# Patient Record
Sex: MALE | Race: WHITE | Employment: OTHER | URBAN - METROPOLITAN AREA
[De-identification: names, ages, dates, MRNs, and addresses within clinical notes are randomized per-mention and may not be internally consistent; named-entity substitution may affect disease eponyms.]

---

## 2017-08-17 PROBLEM — J96.01 ACUTE RESPIRATORY FAILURE WITH HYPOXIA (HCC): Status: ACTIVE | Noted: 2017-08-17

## 2017-08-17 PROBLEM — J90 PLEURAL EFFUSION: Status: ACTIVE | Noted: 2017-08-17

## 2017-08-17 PROBLEM — J18.9 CAP (COMMUNITY ACQUIRED PNEUMONIA): Status: ACTIVE | Noted: 2017-08-17

## 2017-08-17 PROBLEM — E87.20 LACTIC ACID INCREASED: Status: ACTIVE | Noted: 2017-08-17

## 2018-07-18 ENCOUNTER — PREP FOR PROCEDURE (OUTPATIENT)
Dept: PODIATRY | Age: 83
End: 2018-07-18

## 2018-07-18 DIAGNOSIS — E11.10 TYPE 2 DIABETES MELLITUS WITH KETOACIDOSIS WITHOUT COMA, WITH LONG-TERM CURRENT USE OF INSULIN (HCC): Primary | ICD-10-CM

## 2018-07-18 DIAGNOSIS — Z79.4 TYPE 2 DIABETES MELLITUS WITH KETOACIDOSIS WITHOUT COMA, WITH LONG-TERM CURRENT USE OF INSULIN (HCC): Primary | ICD-10-CM

## 2018-07-18 RX ORDER — SODIUM CHLORIDE 0.9 % (FLUSH) 0.9 %
10 SYRINGE (ML) INJECTION PRN
Status: CANCELLED | OUTPATIENT
Start: 2018-07-18 | End: 2019-07-18

## 2018-07-18 RX ORDER — SODIUM CHLORIDE 0.9 % (FLUSH) 0.9 %
10 SYRINGE (ML) INJECTION EVERY 12 HOURS SCHEDULED
Status: CANCELLED | OUTPATIENT
Start: 2018-07-18 | End: 2019-07-18

## 2018-07-20 NOTE — PROGRESS NOTES
[facility] PRE-ADMISSION TESTING INSTRUCTIONS    The Preadmission Testing patient is instructed accordingly using the following criteria (check applicable):    ARRIVAL INSTRUCTIONS:  [x] Parking the day of Surgery is located in the Main Entrance lot. Upon entering the door, make an immediate right to the surgery reception desk    [x] Bring photo ID and insurance card    [] Bring in a copy of Living will or Durable Power of  papers. [x] Please be sure to arrange transportation to and from the hospital    [x] Please arrange for someone to be with you the remainder of the day due to having anesthesia      GENERAL INSTRUCTIONS:    [x] Nothing by mouth after midnight, including gum, candy, mints or water    [x] You may brush your teeth, but do not swallow any water    [x] Take medications as instructed with 1-2 oz of water    [x] Stop herbal supplements and vitamins 5 days prior to procedure    [x] Follow preop dosing of blood thinners per physician instructions    [] Do not take insulin or oral diabetic medications    [] If diabetic and have low blood sugar or feel symptomatic, take 1-2oz apple juice or glucose tablets    [] Bring inhalers day of surgery    [] Bring C-PAP/ Bi-Pap day of surgery    [] Bring urine specimen day of surgery    [x] Antibacterial Soap shower or bath AM of Surgery, no lotion, powders or creams to surgical site    [] Follow bowel prep as instructed per surgeon    [x] No tobacco products within 24 hours of surgery     [x] No alcohol or illegal drug use within 24 hours of surgery.     [x] Jewelry, body piercing's, eyeglasses, contact lenses and dentures are not permitted into surgery (bring cases)      [] Please do not wear any nail polish or make up on the day of surgery    [x] If not already done, you can expect a call from registration    [x] If surgeon requests a time change you will be notified the day prior to surgery    [x] If you receive a survey after

## 2018-07-27 ENCOUNTER — APPOINTMENT (OUTPATIENT)
Dept: GENERAL RADIOLOGY | Age: 83
End: 2018-07-27
Attending: PODIATRIST
Payer: MEDICARE

## 2018-07-27 ENCOUNTER — ANESTHESIA EVENT (OUTPATIENT)
Dept: OPERATING ROOM | Age: 83
End: 2018-07-27
Payer: MEDICARE

## 2018-07-27 ENCOUNTER — ANESTHESIA (OUTPATIENT)
Dept: OPERATING ROOM | Age: 83
End: 2018-07-27
Payer: MEDICARE

## 2018-07-27 ENCOUNTER — HOSPITAL ENCOUNTER (OUTPATIENT)
Age: 83
Setting detail: OUTPATIENT SURGERY
Discharge: HOME | End: 2018-07-27
Attending: PODIATRIST | Admitting: PODIATRIST
Payer: MEDICARE

## 2018-07-27 VITALS
HEART RATE: 70 BPM | WEIGHT: 218 LBS | OXYGEN SATURATION: 96 % | HEIGHT: 66 IN | TEMPERATURE: 97.7 F | RESPIRATION RATE: 24 BRPM | BODY MASS INDEX: 35.03 KG/M2 | SYSTOLIC BLOOD PRESSURE: 122 MMHG | DIASTOLIC BLOOD PRESSURE: 82 MMHG

## 2018-07-27 VITALS — DIASTOLIC BLOOD PRESSURE: 74 MMHG | OXYGEN SATURATION: 98 % | SYSTOLIC BLOOD PRESSURE: 119 MMHG

## 2018-07-27 LAB
BASOPHILS ABSOLUTE: 0.06 E9/L (ref 0–0.2)
BASOPHILS RELATIVE PERCENT: 0.7 % (ref 0–2)
EOSINOPHILS ABSOLUTE: 0.54 E9/L (ref 0.05–0.5)
EOSINOPHILS RELATIVE PERCENT: 6.1 % (ref 0–6)
HCT VFR BLD CALC: 47.4 % (ref 37–54)
HEMOGLOBIN: 15.8 G/DL (ref 12.5–16.5)
IMMATURE GRANULOCYTES #: 0.07 E9/L
IMMATURE GRANULOCYTES %: 0.8 % (ref 0–5)
INR BLD: 1.2
LYMPHOCYTES ABSOLUTE: 1.06 E9/L (ref 1.5–4)
LYMPHOCYTES RELATIVE PERCENT: 11.9 % (ref 20–42)
MCH RBC QN AUTO: 33.7 PG (ref 26–35)
MCHC RBC AUTO-ENTMCNC: 33.3 % (ref 32–34.5)
MCV RBC AUTO: 101.1 FL (ref 80–99.9)
METER GLUCOSE: 99 MG/DL (ref 70–110)
MONOCYTES ABSOLUTE: 0.77 E9/L (ref 0.1–0.95)
MONOCYTES RELATIVE PERCENT: 8.7 % (ref 2–12)
NEUTROPHILS ABSOLUTE: 6.38 E9/L (ref 1.8–7.3)
NEUTROPHILS RELATIVE PERCENT: 71.8 % (ref 43–80)
PDW BLD-RTO: 13.8 FL (ref 11.5–15)
PLATELET # BLD: 234 E9/L (ref 130–450)
PMV BLD AUTO: 9.9 FL (ref 7–12)
PROTHROMBIN TIME: 14 SEC (ref 9.3–12.4)
RBC # BLD: 4.69 E12/L (ref 3.8–5.8)
WBC # BLD: 8.9 E9/L (ref 4.5–11.5)

## 2018-07-27 PROCEDURE — 2580000003 HC RX 258

## 2018-07-27 PROCEDURE — 2709999900 HC NON-CHARGEABLE SUPPLY: Performed by: PODIATRIST

## 2018-07-27 PROCEDURE — 7100000010 HC PHASE II RECOVERY - FIRST 15 MIN: Performed by: PODIATRIST

## 2018-07-27 PROCEDURE — 3700000001 HC ADD 15 MINUTES (ANESTHESIA): Performed by: PODIATRIST

## 2018-07-27 PROCEDURE — 3209999900 FLUORO FOR SURGICAL PROCEDURES

## 2018-07-27 PROCEDURE — 85025 COMPLETE CBC W/AUTO DIFF WBC: CPT

## 2018-07-27 PROCEDURE — 3600000002 HC SURGERY LEVEL 2 BASE: Performed by: PODIATRIST

## 2018-07-27 PROCEDURE — 85610 PROTHROMBIN TIME: CPT

## 2018-07-27 PROCEDURE — 88305 TISSUE EXAM BY PATHOLOGIST: CPT

## 2018-07-27 PROCEDURE — 3600000012 HC SURGERY LEVEL 2 ADDTL 15MIN: Performed by: PODIATRIST

## 2018-07-27 PROCEDURE — 6360000002 HC RX W HCPCS: Performed by: PODIATRIST

## 2018-07-27 PROCEDURE — 2500000003 HC RX 250 WO HCPCS: Performed by: PODIATRIST

## 2018-07-27 PROCEDURE — 82962 GLUCOSE BLOOD TEST: CPT

## 2018-07-27 PROCEDURE — 6360000002 HC RX W HCPCS

## 2018-07-27 PROCEDURE — 7100000011 HC PHASE II RECOVERY - ADDTL 15 MIN: Performed by: PODIATRIST

## 2018-07-27 PROCEDURE — 3700000000 HC ANESTHESIA ATTENDED CARE: Performed by: PODIATRIST

## 2018-07-27 PROCEDURE — 36415 COLL VENOUS BLD VENIPUNCTURE: CPT

## 2018-07-27 RX ORDER — DIPHENHYDRAMINE HYDROCHLORIDE 50 MG/ML
12.5 INJECTION INTRAMUSCULAR; INTRAVENOUS
Status: DISCONTINUED | OUTPATIENT
Start: 2018-07-27 | End: 2018-07-27 | Stop reason: HOSPADM

## 2018-07-27 RX ORDER — PROMETHAZINE HYDROCHLORIDE 25 MG/ML
6.25 INJECTION, SOLUTION INTRAMUSCULAR; INTRAVENOUS
Status: DISCONTINUED | OUTPATIENT
Start: 2018-07-27 | End: 2018-07-27 | Stop reason: HOSPADM

## 2018-07-27 RX ORDER — PROPOFOL 10 MG/ML
INJECTION, EMULSION INTRAVENOUS CONTINUOUS PRN
Status: DISCONTINUED | OUTPATIENT
Start: 2018-07-27 | End: 2018-07-27 | Stop reason: SDUPTHER

## 2018-07-27 RX ORDER — MEPERIDINE HYDROCHLORIDE 25 MG/ML
12.5 INJECTION INTRAMUSCULAR; INTRAVENOUS; SUBCUTANEOUS
Status: DISCONTINUED | OUTPATIENT
Start: 2018-07-27 | End: 2018-07-27 | Stop reason: HOSPADM

## 2018-07-27 RX ORDER — SODIUM CHLORIDE 9 MG/ML
INJECTION, SOLUTION INTRAVENOUS CONTINUOUS PRN
Status: DISCONTINUED | OUTPATIENT
Start: 2018-07-27 | End: 2018-07-27 | Stop reason: SDUPTHER

## 2018-07-27 RX ORDER — FENTANYL CITRATE 50 UG/ML
50 INJECTION, SOLUTION INTRAMUSCULAR; INTRAVENOUS EVERY 5 MIN PRN
Status: DISCONTINUED | OUTPATIENT
Start: 2018-07-27 | End: 2018-07-27 | Stop reason: HOSPADM

## 2018-07-27 RX ORDER — FENTANYL CITRATE 50 UG/ML
INJECTION, SOLUTION INTRAMUSCULAR; INTRAVENOUS PRN
Status: DISCONTINUED | OUTPATIENT
Start: 2018-07-27 | End: 2018-07-27 | Stop reason: SDUPTHER

## 2018-07-27 RX ORDER — SODIUM CHLORIDE 0.9 % (FLUSH) 0.9 %
10 SYRINGE (ML) INJECTION EVERY 12 HOURS SCHEDULED
Status: DISCONTINUED | OUTPATIENT
Start: 2018-07-27 | End: 2018-07-27 | Stop reason: HOSPADM

## 2018-07-27 RX ORDER — ONDANSETRON 2 MG/ML
INJECTION INTRAMUSCULAR; INTRAVENOUS PRN
Status: DISCONTINUED | OUTPATIENT
Start: 2018-07-27 | End: 2018-07-27 | Stop reason: SDUPTHER

## 2018-07-27 RX ORDER — BUPIVACAINE HYDROCHLORIDE 2.5 MG/ML
INJECTION, SOLUTION EPIDURAL; INFILTRATION; INTRACAUDAL PRN
Status: DISCONTINUED | OUTPATIENT
Start: 2018-07-27 | End: 2018-07-27 | Stop reason: HOSPADM

## 2018-07-27 RX ORDER — SODIUM CHLORIDE 0.9 % (FLUSH) 0.9 %
10 SYRINGE (ML) INJECTION PRN
Status: DISCONTINUED | OUTPATIENT
Start: 2018-07-27 | End: 2018-07-27 | Stop reason: HOSPADM

## 2018-07-27 RX ORDER — DEXAMETHASONE SODIUM PHOSPHATE 4 MG/ML
INJECTION, SOLUTION INTRA-ARTICULAR; INTRALESIONAL; INTRAMUSCULAR; INTRAVENOUS; SOFT TISSUE PRN
Status: DISCONTINUED | OUTPATIENT
Start: 2018-07-27 | End: 2018-07-27 | Stop reason: SDUPTHER

## 2018-07-27 RX ADMIN — SODIUM CHLORIDE: 9 INJECTION, SOLUTION INTRAVENOUS at 11:52

## 2018-07-27 RX ADMIN — Medication 2 G: at 11:59

## 2018-07-27 RX ADMIN — FENTANYL CITRATE 50 MCG: 50 INJECTION, SOLUTION INTRAMUSCULAR; INTRAVENOUS at 11:58

## 2018-07-27 RX ADMIN — FENTANYL CITRATE 50 MCG: 50 INJECTION, SOLUTION INTRAMUSCULAR; INTRAVENOUS at 12:05

## 2018-07-27 RX ADMIN — ONDANSETRON HYDROCHLORIDE 4 MG: 2 INJECTION, SOLUTION INTRAMUSCULAR; INTRAVENOUS at 12:07

## 2018-07-27 RX ADMIN — DEXAMETHASONE SODIUM PHOSPHATE 10 MG: 4 INJECTION, SOLUTION INTRAMUSCULAR; INTRAVENOUS at 12:07

## 2018-07-27 RX ADMIN — PROPOFOL 75 MCG/KG/MIN: 10 INJECTION, EMULSION INTRAVENOUS at 11:58

## 2018-07-27 ASSESSMENT — PULMONARY FUNCTION TESTS
PIF_VALUE: 0
PIF_VALUE: 1
PIF_VALUE: 1
PIF_VALUE: 0
PIF_VALUE: 1
PIF_VALUE: 0

## 2018-07-27 ASSESSMENT — PAIN SCALES - GENERAL: PAINLEVEL_OUTOF10: 0

## 2018-07-27 NOTE — ANESTHESIA PRE PROCEDURE
Department of Anesthesiology  Preprocedure Note       Name:  Dano Jett   Age:  80 y.o.  :  1929                                          MRN:  [id number]         Date:  2018      Surgeon: Keo Bradley):  Nicolasa Ansari DPM    Procedure: Procedure(s):  RIGHT FOOT PLANTAR FASCIITIS    Medications prior to admission:   Prior to Admission medications    Medication Sig Start Date End Date Taking? Authorizing Provider   Menthol (BIOFREEZE) 10 % AERO Apply topically   Yes Historical Provider, MD   sertraline (ZOLOFT) 25 MG tablet Take 25 mg by mouth daily Instructed to take am of procedure   Yes Historical Provider, MD   magnesium chloride (MAG DELAY) 535 (64 Mg) MG TBCR extended release tablet Take 64 mg by mouth daily   Yes Historical Provider, MD   albuterol sulfate HFA (PROAIR HFA) 108 (90 Base) MCG/ACT inhaler Inhale 2 puffs into the lungs every 6 hours as needed for Wheezing  Patient taking differently: Inhale 2 puffs into the lungs every 6 hours as needed for Wheezing Instructed to take am of procedure 17  Yes Natalia Gautam DO   HYDROcodone-acetaminophen (NORCO) 5-325 MG per tablet Take 1 tablet by mouth every 8 hours as needed for Pain   Yes Historical Provider, MD   tolterodine (DETROL LA) 4 MG ER capsule Take 4 mg by mouth every morning  8/5/15  Yes Historical Provider, MD   Multiple Vitamins-Minerals (THERAPEUTIC MULTIVITAMIN-MINERALS) tablet Take 1 tablet by mouth every morning LD 18   Yes Historical Provider, MD   warfarin (COUMADIN) 2 MG tablet Take 3 mg by mouth nightly LD 18   Yes Historical Provider, MD   donepezil (ARICEPT) 5 MG tablet Take 5 mg by mouth nightly.  2/11/15  Yes Historical Provider, MD   tamsulosin (FLOMAX) 0.4 MG capsule Take 0.4 mg by mouth nightly    Yes Historical Provider, MD   finasteride (PROSCAR) 5 MG tablet Take 5 mg by mouth Daily with supper    Yes Historical Provider, MD   furosemide (LASIX) 40 MG tablet Take 40 mg by mouth every morning    Yes Results   Component Value Date    WBC 10.7 08/21/2017    RBC 4.66 08/21/2017    HGB 15.8 08/21/2017    HCT 46.5 08/21/2017    MCV 99.8 08/21/2017    RDW 14.1 08/21/2017     08/21/2017       CMP:   Lab Results   Component Value Date     08/21/2017    K 4.5 08/21/2017     08/21/2017    CO2 21 08/21/2017    BUN 25 08/21/2017    CREATININE 1.0 08/21/2017    GFRAA >60 08/21/2017    LABGLOM >60 08/21/2017    GLUCOSE 106 08/21/2017    PROT 6.4 08/21/2017    CALCIUM 8.8 08/21/2017    BILITOT 1.4 08/21/2017    ALKPHOS 68 08/21/2017    AST 24 08/21/2017    ALT 24 08/21/2017       POC Tests: No results for input(s): POCGLU, POCNA, POCK, POCCL, POCBUN, POCHEMO, POCHCT in the last 72 hours. Coags:   Lab Results   Component Value Date    PROTIME 20.6 08/21/2017    PROTIME 21.2 02/06/2012    INR 1.8 08/21/2017       HCG (If Applicable): No results found for: PREGTESTUR, PREGSERUM, HCG, HCGQUANT     ABGs: No results found for: PHART, PO2ART, GED3MMO, RAJ5WZQ, BEART, W5WBFZAF     Type & Screen (If Applicable):  No results found for: LABABO, [address]    Anesthesia Evaluation  Patient summary reviewed and Nursing notes reviewed no history of anesthetic complications:   Airway: Mallampati: II  TM distance: >3 FB   Neck ROM: full  Mouth opening: > = 3 FB Dental: normal exam         Pulmonary:   (+) pneumonia: resolved,  COPD:  sleep apnea: on CPAP,  decreased breath sounds,                             Cardiovascular:  Exercise tolerance: poor (<4 METS),   (+) hypertension:, dysrhythmias:,         Rhythm: irregular  Rate: normal                    Neuro/Psych:   (+) depression/anxiety             GI/Hepatic/Renal:            ROS comment: BPH. Endo/Other:    (+) Diabetes, . Abdominal:           Vascular:   + PE. Anesthesia Plan      MAC     ASA 3             Anesthetic plan and risks discussed with patient and spouse.                       Charles Newsome MD

## 2018-07-27 NOTE — BRIEF OP NOTE
Brief Postoperative Note    Lupe Mathew  YOB: 1929  [id number]    Pre-operative Diagnosis: plantar fascitis right foot     Post-operative Diagnosis: Same    Procedure: plantar fascial release  Right foot     Anesthesia: MAC    Surgeons/Assistants: dr faustin      Estimated Blood Loss: less than 50     Complications: None    Specimens: Was Obtained: muscle     Findings: muscle    Electronically signed by Cristela Dorado DPM on 7/27/2018 at 12:39 PM

## 2018-07-28 NOTE — OP NOTE
lateral band intact. The area was flushed  with copious amounts of normal saline solution. C-arm was used to  visualize any inferior spur that was not noted. Again, the area was  flushed with copious amounts of normal saline solution. Closure was  achieved with 3-0 nylon in simple interrupted for skin. Postop dressing of  Adaptic, Betadine, 4x4s, Kerlix, and an Ace bandage. The patient is  nonweightbearing for two weeks. Digital circulation 1 through 5 on the  right side was intact after deflation of the tourniquet for approximately  13 minutes. The patient had no complications, returned to recovery, and  will be seen in office in three days.         Ayse Toure DPM    D: 07/27/2018 12:57:05       T: 07/27/2018 21:59:14     [email or web address]  Job#: [id number]     Doc#: [id number]    CC:

## 2018-07-30 NOTE — ANESTHESIA POSTPROCEDURE EVALUATION
Department of Anesthesiology  Postprocedure Note    Patient: Tyrese Ozuna  MRN: [id number]  YOB: 1929  Date of evaluation: 7/30/2018  Time:  1:53 PM     Procedure Summary     Date:  07/27/18 Room / Location:  [facility] OR 09 / [facility] OR    Anesthesia Start:  1152 Anesthesia Stop:  1240    Procedure:  RIGHT FOOT PLANTAR FASCIITIS (Right ) Diagnosis:  (PLANTAR FASCIITIS RIGHT FOOT)    Surgeon:  Jay Soria DPM Responsible Provider:  Luis Childs MD    Anesthesia Type:  MAC ASA Status:  3          Anesthesia Type: MAC    Diana Phase I: Diana Score: 10    Diana Phase II: Diana Score: 10    Last vitals: Reviewed and per EMR flowsheets.        Anesthesia Post Evaluation    Patient location during evaluation: PACU  Patient participation: complete - patient participated  Level of consciousness: awake and alert  Airway patency: patent  Nausea & Vomiting: no vomiting and no nausea  Complications: no  Cardiovascular status: blood pressure returned to baseline  Respiratory status: acceptable  Hydration status: euvolemic

## 2018-08-10 ENCOUNTER — OFFICE VISIT (OUTPATIENT)
Dept: CARDIOLOGY CLINIC | Age: 83
End: 2018-08-10
Payer: MEDICARE

## 2018-08-10 VITALS
HEIGHT: 66 IN | RESPIRATION RATE: 16 BRPM | WEIGHT: 218.8 LBS | SYSTOLIC BLOOD PRESSURE: 116 MMHG | BODY MASS INDEX: 35.17 KG/M2 | HEART RATE: 92 BPM | DIASTOLIC BLOOD PRESSURE: 70 MMHG

## 2018-08-10 DIAGNOSIS — I48.91 ATRIAL FIBRILLATION, UNSPECIFIED TYPE (HCC): Primary | Chronic | ICD-10-CM

## 2018-08-10 PROCEDURE — 93000 ELECTROCARDIOGRAM COMPLETE: CPT | Performed by: INTERNAL MEDICINE

## 2018-08-10 PROCEDURE — 99213 OFFICE O/P EST LOW 20 MIN: CPT | Performed by: INTERNAL MEDICINE

## 2018-08-10 PROCEDURE — 1123F ACP DISCUSS/DSCN MKR DOCD: CPT | Performed by: INTERNAL MEDICINE

## 2018-08-10 PROCEDURE — 1101F PT FALLS ASSESS-DOCD LE1/YR: CPT | Performed by: INTERNAL MEDICINE

## 2018-08-10 PROCEDURE — 4040F PNEUMOC VAC/ADMIN/RCVD: CPT | Performed by: INTERNAL MEDICINE

## 2018-08-10 PROCEDURE — G8417 CALC BMI ABV UP PARAM F/U: HCPCS | Performed by: INTERNAL MEDICINE

## 2018-08-10 PROCEDURE — 1036F TOBACCO NON-USER: CPT | Performed by: INTERNAL MEDICINE

## 2018-08-10 PROCEDURE — G8427 DOCREV CUR MEDS BY ELIG CLIN: HCPCS | Performed by: INTERNAL MEDICINE

## 2018-08-10 NOTE — PROGRESS NOTES
LVEF normal, KERA, mildmoderate TR with moderate PHTN. RVSP 60 mmHg. Trivial pericardial fluid. 21. Dobutamine MPS, 05/19/2008. No chest pain or ST changes from baseline RBBB. Gated images normal, EF 73%, no TID. Small to moderate sized area of mild basal lateral ischemia. 22. [place] [facility] [place] 23. [address]. Normal sinus mechanism without complications achieved. 24. Prostate laser surgery, Dr. Cyndie Johnson, 11/30/2009.  25. Obesity. BMI 33.41  08/2015. 26. Adenosine MPS, 06/22/2010 with no chest pain or ST changes. Gated images normal, EF 67%. Borderline TID. Moderate sized area of equivocal basal lateral ischemia. 27. Repeat lipid panel on Niaspan 1000 mg. Total cholesterol 174, triglycerides 283, HDL 33, LDL 84.  28. Echo, 11/22/2010. LV dilatation. Mild concentric LVH. No regional wall motion abnormalities. EF normal.  Stage I diastolic dysfunction. TDI showed no LA or LVEDP pressure elevation. Moderate LAE. Marked HAJA. Mild RVE. No hemodynamically significant valvular abnormalities. RVSP 55. Aortic root 4.1.    29. Echo, 01/30/2012. Markedly dilated LV. Mild concentric LVH. LV regional wall motion and systolic function normal.   Tissue Doppler indicates normal LV filling pressures. LA moderately dilated, marked HAJA. Mild MR, mild TR.  RVSP 50-55 mmHg. 30. DCCV, 02/07/2012 utilizing AP pads. Patient received a single 100 watt second shock and converted immediately to sinus rhythm. 31. OP evaluation, 09/13/2012. NYHA Class II. EKG shows AF with ventricular rate 64, RBBB and left axis deviation. 32. Head CT, 09/03/2013. Moderate to severe diffuse cerebral atrophy. [address] [place] [address] ER [place], 06/28/2014. CBC normal.  Lytes normal.  BUN 22, Cr 1.3. Pulse 98. /69. Probable mechanical fall. 34. Head CT, 05/26/2014. No acute process. Mild white matter changes. Mild volume loss.     35. Labs, 07/2014. BUN 19, Cr 1.9, GFR 50. Lytes normal.  36. 24-hour ambulatory monitor, 07/2014. AF. Isolated PVCs. Average rate 67. Longest RR cycle 2.2 seconds. 37. [facility] admission, 06/06/2015 for atypical chest pain. Tn negative. [address] MPS, 06/07/2015. No reversible defects to suggest ischemia. EF 67%. 39. Echo, 06/07/2015 ( [employer]). EF normal.  Mild RV, RA and LAE. Moderate MR.  RVSP 63. PHTN moderate. 40. CVA prophylaxis with [address] (Coumadin) 03/2016. 41. Echo 06/07/2015. Normal EF.  LVH. RVSP 63. EF 65%. 42. Hospitalized 08/17/2017 with fatigue, weakness and dyspnea. hx poor oral intake. Had lactic acidosis and acute kidney injury and an elevated digoxin level (dig level 2.3,                   PRO-BNP= 655 with BUN= 36 and creatinine= 1.7). He was volume expanded. Digoxin was stopped. Review of Systems:  Constitutional: negative for fever and chills  Respiratory: negative for cough and hemoptysis  Cardiovascular:   Gastrointestinal: negative for abdominal pain, diarrhea, nausea and vomiting  Genitourinary:negative for dysuria and hematuria  Derm: negative for rash and skin lesion(s)  Neurological: negative for seizures and tremors  Endocrine: negative for diabetic symptoms including polydipsia and polyuria  Musculoskeletal: negative for CTD  Psychiatric: negative for psychosis and major depression    On examination, he is alert, pleasant, appropriate, elderly man who appears younger than his stated age. Skin is warm and dry. Respirations are unlabored. /70   Pulse 92   Resp 16   Ht 5' 6\" (1.676 m)   Wt 218 lb 12.8 oz (99.2 kg)   BMI 35.32 kg/m² . HEENT negative for scleral icterus. Extraocular muscles intact. No facial asymmetry or central cyanosis. Neck without masses or goiter. No bruit or JVD. Cardiac apex not displaced. Rhythm is irregular. Abdomen normal.  Extremities without edema. Walking boot. EKG today shows atrial fibrillation. mouth Daily with supper , Disp: , Rfl:     furosemide (LASIX) 40 MG tablet, Take 40 mg by mouth every morning , Disp: , Rfl:     diltiazem (TAZTIA XT) 360 MG SR capsule, Take 360 mg by mouth every morning Instructed to take am of procedure, Disp: , Rfl:     aspirin 81 MG EC tablet, Take 81 mg by mouth nightly To check on hold date, Disp: , Rfl:     Dusty Arboleda MD

## 2018-09-26 LAB
AVERAGE GLUCOSE: NORMAL
CHOLESTEROL, TOTAL: 160 MG/DL
CHOLESTEROL/HDL RATIO: 3.6
CREATININE: 1.2 MG/DL
HBA1C MFR BLD: 5.8 %
HDLC SERPL-MCNC: 45 MG/DL (ref 35–70)
LDL CHOLESTEROL CALCULATED: 94 MG/DL (ref 0–160)
POTASSIUM (K+): 4.3
TRIGL SERPL-MCNC: 109 MG/DL
VLDLC SERPL CALC-MCNC: NORMAL MG/DL

## 2018-12-13 ENCOUNTER — TELEPHONE (OUTPATIENT)
Dept: ADMINISTRATIVE | Age: 83
End: 2018-12-13

## 2018-12-13 DIAGNOSIS — R06.02 SHORTNESS OF BREATH: Primary | ICD-10-CM

## 2018-12-18 ENCOUNTER — HOSPITAL ENCOUNTER (OUTPATIENT)
Dept: CARDIOLOGY | Age: 83
Discharge: HOME | End: 2018-12-18
Payer: MEDICARE

## 2018-12-18 DIAGNOSIS — R06.09 DOE (DYSPNEA ON EXERTION): ICD-10-CM

## 2018-12-18 DIAGNOSIS — I27.20 PULMONARY HYPERTENSION (HCC): ICD-10-CM

## 2018-12-18 LAB
LV EF: 40 %
LVEF MODALITY: NORMAL

## 2018-12-18 PROCEDURE — 93306 TTE W/DOPPLER COMPLETE: CPT | Performed by: PSYCHIATRY & NEUROLOGY

## 2018-12-20 ENCOUNTER — HOSPITAL ENCOUNTER (INPATIENT)
Age: 83
LOS: 4 days | Discharge: HOME | DRG: 378 | End: 2018-12-24
Attending: EMERGENCY MEDICINE | Admitting: INTERNAL MEDICINE
Payer: MEDICARE

## 2018-12-20 ENCOUNTER — APPOINTMENT (OUTPATIENT)
Dept: CT IMAGING | Age: 83
DRG: 378 | End: 2018-12-20
Payer: MEDICARE

## 2018-12-20 DIAGNOSIS — Z79.01 CHRONIC ANTICOAGULATION: ICD-10-CM

## 2018-12-20 DIAGNOSIS — N18.30 CKD (CHRONIC KIDNEY DISEASE) STAGE 3, GFR 30-59 ML/MIN (HCC): ICD-10-CM

## 2018-12-20 DIAGNOSIS — K62.5 RECTAL BLEEDING: Primary | ICD-10-CM

## 2018-12-20 PROBLEM — K92.2 GI BLEED: Status: ACTIVE | Noted: 2018-12-20

## 2018-12-20 PROBLEM — E87.20 LACTIC ACID INCREASED: Status: RESOLVED | Noted: 2017-08-17 | Resolved: 2018-12-20

## 2018-12-20 PROBLEM — J96.01 ACUTE RESPIRATORY FAILURE WITH HYPOXIA (HCC): Status: RESOLVED | Noted: 2017-08-17 | Resolved: 2018-12-20

## 2018-12-20 PROBLEM — J90 PLEURAL EFFUSION: Status: RESOLVED | Noted: 2017-08-17 | Resolved: 2018-12-20

## 2018-12-20 PROBLEM — J18.9 CAP (COMMUNITY ACQUIRED PNEUMONIA): Status: RESOLVED | Noted: 2017-08-17 | Resolved: 2018-12-20

## 2018-12-20 LAB
ABO/RH: NORMAL
ALBUMIN SERPL-MCNC: 3.6 G/DL (ref 3.5–5.2)
ALP BLD-CCNC: 63 U/L (ref 40–129)
ALT SERPL-CCNC: 14 U/L (ref 0–40)
ANION GAP SERPL CALCULATED.3IONS-SCNC: 12 MMOL/L (ref 7–16)
ANTIBODY SCREEN: NORMAL
AST SERPL-CCNC: 25 U/L (ref 0–39)
BASOPHILS ABSOLUTE: 0.02 E9/L (ref 0–0.2)
BASOPHILS RELATIVE PERCENT: 0.2 % (ref 0–2)
BILIRUB SERPL-MCNC: 0.5 MG/DL (ref 0–1.2)
BUN BLDV-MCNC: 36 MG/DL (ref 8–23)
CALCIUM SERPL-MCNC: 9 MG/DL (ref 8.6–10.2)
CHLORIDE BLD-SCNC: 104 MMOL/L (ref 98–107)
CO2: 24 MMOL/L (ref 22–29)
CREAT SERPL-MCNC: 1.4 MG/DL (ref 0.7–1.2)
DAT POLYSPECIFIC: NORMAL
DR. NOTIFY: NORMAL
EOSINOPHILS ABSOLUTE: 0.69 E9/L (ref 0.05–0.5)
EOSINOPHILS RELATIVE PERCENT: 7.2 % (ref 0–6)
GFR AFRICAN AMERICAN: 58
GFR NON-AFRICAN AMERICAN: 48 ML/MIN/1.73
GLUCOSE BLD-MCNC: 99 MG/DL (ref 74–99)
HCT VFR BLD CALC: 40.8 % (ref 37–54)
HEMOGLOBIN: 13.5 G/DL (ref 12.5–16.5)
IMMATURE GRANULOCYTES #: 0.04 E9/L
IMMATURE GRANULOCYTES %: 0.4 % (ref 0–5)
INR BLD: 3.9
LIPASE: 20 U/L (ref 13–60)
LYMPHOCYTES ABSOLUTE: 1.09 E9/L (ref 1.5–4)
LYMPHOCYTES RELATIVE PERCENT: 11.4 % (ref 20–42)
MCH RBC QN AUTO: 33.4 PG (ref 26–35)
MCHC RBC AUTO-ENTMCNC: 33.1 % (ref 32–34.5)
MCV RBC AUTO: 101 FL (ref 80–99.9)
MONOCYTES ABSOLUTE: 0.87 E9/L (ref 0.1–0.95)
MONOCYTES RELATIVE PERCENT: 9.1 % (ref 2–12)
NEUTROPHILS ABSOLUTE: 6.82 E9/L (ref 1.8–7.3)
NEUTROPHILS RELATIVE PERCENT: 71.7 % (ref 43–80)
PDW BLD-RTO: 14.3 FL (ref 11.5–15)
PLATELET # BLD: 219 E9/L (ref 130–450)
PMV BLD AUTO: 9.5 FL (ref 7–12)
POTASSIUM SERPL-SCNC: 4.8 MMOL/L (ref 3.5–5)
PROTHROMBIN TIME: 44.4 SEC (ref 9.3–12.4)
RBC # BLD: 4.04 E12/L (ref 3.8–5.8)
SODIUM BLD-SCNC: 140 MMOL/L (ref 132–146)
TOTAL PROTEIN: 6 G/DL (ref 6.4–8.3)
TROPONIN: <0.01 NG/ML (ref 0–0.03)
WBC # BLD: 9.5 E9/L (ref 4.5–11.5)

## 2018-12-20 PROCEDURE — 36415 COLL VENOUS BLD VENIPUNCTURE: CPT

## 2018-12-20 PROCEDURE — 86900 BLOOD TYPING SEROLOGIC ABO: CPT

## 2018-12-20 PROCEDURE — 80053 COMPREHEN METABOLIC PANEL: CPT

## 2018-12-20 PROCEDURE — 2580000003 HC RX 258: Performed by: INTERNAL MEDICINE

## 2018-12-20 PROCEDURE — 6360000004 HC RX CONTRAST MEDICATION: Performed by: RADIOLOGY

## 2018-12-20 PROCEDURE — 85025 COMPLETE CBC W/AUTO DIFF WBC: CPT

## 2018-12-20 PROCEDURE — 2580000003 HC RX 258: Performed by: EMERGENCY MEDICINE

## 2018-12-20 PROCEDURE — 86922 COMPATIBILITY TEST ANTIGLOB: CPT

## 2018-12-20 PROCEDURE — 86905 BLOOD TYPING RBC ANTIGENS: CPT

## 2018-12-20 PROCEDURE — 86870 RBC ANTIBODY IDENTIFICATION: CPT

## 2018-12-20 PROCEDURE — 86850 RBC ANTIBODY SCREEN: CPT

## 2018-12-20 PROCEDURE — 84484 ASSAY OF TROPONIN QUANT: CPT

## 2018-12-20 PROCEDURE — 2060000000 HC ICU INTERMEDIATE R&B

## 2018-12-20 PROCEDURE — 85610 PROTHROMBIN TIME: CPT

## 2018-12-20 PROCEDURE — 86880 COOMBS TEST DIRECT: CPT

## 2018-12-20 PROCEDURE — 99285 EMERGENCY DEPT VISIT HI MDM: CPT

## 2018-12-20 PROCEDURE — 83690 ASSAY OF LIPASE: CPT

## 2018-12-20 PROCEDURE — 86901 BLOOD TYPING SEROLOGIC RH(D): CPT

## 2018-12-20 PROCEDURE — 6360000002 HC RX W HCPCS: Performed by: EMERGENCY MEDICINE

## 2018-12-20 PROCEDURE — 74177 CT ABD & PELVIS W/CONTRAST: CPT

## 2018-12-20 RX ORDER — TAMSULOSIN HYDROCHLORIDE 0.4 MG/1
0.4 CAPSULE ORAL NIGHTLY
Status: DISCONTINUED | OUTPATIENT
Start: 2018-12-20 | End: 2018-12-24 | Stop reason: HOSPADM

## 2018-12-20 RX ORDER — SODIUM CHLORIDE 0.9 % (FLUSH) 0.9 %
10 SYRINGE (ML) INJECTION PRN
Status: DISCONTINUED | OUTPATIENT
Start: 2018-12-20 | End: 2018-12-24 | Stop reason: HOSPADM

## 2018-12-20 RX ORDER — ACETAMINOPHEN 325 MG/1
650 TABLET ORAL EVERY 4 HOURS PRN
Status: DISCONTINUED | OUTPATIENT
Start: 2018-12-20 | End: 2018-12-24 | Stop reason: HOSPADM

## 2018-12-20 RX ORDER — PANTOPRAZOLE SODIUM 40 MG/10ML
40 INJECTION, POWDER, LYOPHILIZED, FOR SOLUTION INTRAVENOUS DAILY
Status: DISCONTINUED | OUTPATIENT
Start: 2018-12-21 | End: 2018-12-21

## 2018-12-20 RX ORDER — PHYTONADIONE 10 MG/ML
10 INJECTION, EMULSION INTRAMUSCULAR; INTRAVENOUS; SUBCUTANEOUS ONCE
Status: DISCONTINUED | OUTPATIENT
Start: 2018-12-20 | End: 2018-12-20 | Stop reason: SDUPTHER

## 2018-12-20 RX ORDER — TROSPIUM CHLORIDE 20 MG/1
20 TABLET, FILM COATED ORAL NIGHTLY
Status: DISCONTINUED | OUTPATIENT
Start: 2018-12-20 | End: 2018-12-24 | Stop reason: HOSPADM

## 2018-12-20 RX ORDER — MAGNESIUM CHLORIDE 64 MG
64 TABLET, DELAYED RELEASE (ENTERIC COATED) ORAL DAILY
Status: DISCONTINUED | OUTPATIENT
Start: 2018-12-21 | End: 2018-12-24 | Stop reason: HOSPADM

## 2018-12-20 RX ORDER — ONDANSETRON 2 MG/ML
4 INJECTION INTRAMUSCULAR; INTRAVENOUS EVERY 6 HOURS PRN
Status: DISCONTINUED | OUTPATIENT
Start: 2018-12-20 | End: 2018-12-24 | Stop reason: HOSPADM

## 2018-12-20 RX ORDER — DILTIAZEM HYDROCHLORIDE 180 MG/1
360 CAPSULE, COATED, EXTENDED RELEASE ORAL EVERY MORNING
Status: DISCONTINUED | OUTPATIENT
Start: 2018-12-21 | End: 2018-12-21

## 2018-12-20 RX ORDER — FINASTERIDE 5 MG/1
5 TABLET, FILM COATED ORAL
Status: DISCONTINUED | OUTPATIENT
Start: 2018-12-20 | End: 2018-12-24 | Stop reason: HOSPADM

## 2018-12-20 RX ORDER — SODIUM CHLORIDE 0.9 % (FLUSH) 0.9 %
10 SYRINGE (ML) INJECTION EVERY 12 HOURS SCHEDULED
Status: DISCONTINUED | OUTPATIENT
Start: 2018-12-20 | End: 2018-12-24 | Stop reason: HOSPADM

## 2018-12-20 RX ORDER — 0.9 % SODIUM CHLORIDE 0.9 %
250 INTRAVENOUS SOLUTION INTRAVENOUS ONCE
Status: DISCONTINUED | OUTPATIENT
Start: 2018-12-20 | End: 2018-12-21

## 2018-12-20 RX ORDER — DONEPEZIL HYDROCHLORIDE 5 MG/1
5 TABLET, FILM COATED ORAL NIGHTLY
Status: DISCONTINUED | OUTPATIENT
Start: 2018-12-20 | End: 2018-12-24 | Stop reason: HOSPADM

## 2018-12-20 RX ORDER — SODIUM CHLORIDE 9 MG/ML
INJECTION, SOLUTION INTRAVENOUS CONTINUOUS
Status: DISCONTINUED | OUTPATIENT
Start: 2018-12-20 | End: 2018-12-24

## 2018-12-20 RX ORDER — HYDROCODONE BITARTRATE AND ACETAMINOPHEN 5; 325 MG/1; MG/1
1 TABLET ORAL EVERY 6 HOURS PRN
Status: DISCONTINUED | OUTPATIENT
Start: 2018-12-20 | End: 2018-12-24 | Stop reason: HOSPADM

## 2018-12-20 RX ADMIN — PHYTONADIONE 10 MG: 10 INJECTION, EMULSION INTRAMUSCULAR; INTRAVENOUS; SUBCUTANEOUS at 20:35

## 2018-12-20 RX ADMIN — SODIUM CHLORIDE: 9 INJECTION, SOLUTION INTRAVENOUS at 22:00

## 2018-12-20 RX ADMIN — IOPAMIDOL 110 ML: 755 INJECTION, SOLUTION INTRAVENOUS at 19:09

## 2018-12-20 ASSESSMENT — PAIN SCALES - GENERAL: PAINLEVEL_OUTOF10: 0

## 2018-12-20 NOTE — ED PROVIDER NOTES
[id number]     Description Blood Bank Red Blood Cells, Leuko-reduced     Unit Number [id number]     Dispense Status Blood Bank selected     Product Code Blood Bank [id number]     Description Blood Bank Red Blood Cells, Leuko-reduced     Unit Number [id number]     Dispense Status Blood Bank selected        RADIOLOGY:  Interpreted by Radiologist.  CT ABDOMEN PELVIS W IV CONTRAST Additional Contrast? None   Final Result   Fluid-filled and somewhat dilated rectosigmoid suggesting underlying   diarrheal illness. Colonic diverticuli. Cardiomegaly. Poorly calcified   stone versus sludge ball in the gallbladder lumen. Vascular   malformation in the left hepatic lobe. Chronic pancreatitis. Renal   cysts. ------------------------- NURSING NOTES AND VITALS REVIEWED ---------------------------   The nursing notes within the ED encounter and vital signs as below have been reviewed. /72   Pulse 102   Temp 98.6 °F (37 °C) (Oral)   Resp 19   Ht 5' 5\" (1.651 m)   Wt 196 lb 11.2 oz (89.2 kg)   SpO2 97%   BMI 32.73 kg/m²   Oxygen Saturation Interpretation: Normal    ---------------------------------------------------PHYSICAL EXAM--------------------------------------    Constitutional/General: Alert and oriented x3, well appearing, non toxic in NAD. Afebrile. Head: NC/AT  Eyes: PERRL, EOMI  HENT: Oropharynx clear. Handling secretions. Neck: Supple, full ROM  Pulmonary: Lungs clear to auscultation bilaterally, no wheezes, rales, or rhonchi. Not in respiratory distress. Cardiovascular: Regular rate. Regular rhythm. No murmurs. No gallops, or rubs. 2+ distal pulses. Abdomen: Soft, non tender, non distended. No guarding, rebound, or rigidity. Extremities: Moves all extremities x 4. Warm and well perfused. LLE erythema and swelling. Skin: Warm and dry without rash. Back: No CVA tenderness.    Neurologic: GCS 15, no focal deficits, systemic strength 5/5 to all extremities symmetrically, CN

## 2018-12-21 ENCOUNTER — ANESTHESIA EVENT (OUTPATIENT)
Dept: ENDOSCOPY | Age: 83
DRG: 378 | End: 2018-12-21
Payer: MEDICARE

## 2018-12-21 ENCOUNTER — ANESTHESIA (OUTPATIENT)
Dept: ENDOSCOPY | Age: 83
DRG: 378 | End: 2018-12-21
Payer: MEDICARE

## 2018-12-21 VITALS — SYSTOLIC BLOOD PRESSURE: 151 MMHG | OXYGEN SATURATION: 100 % | DIASTOLIC BLOOD PRESSURE: 91 MMHG

## 2018-12-21 LAB
ALBUMIN SERPL-MCNC: 3.2 G/DL (ref 3.5–5.2)
ALP BLD-CCNC: 55 U/L (ref 40–129)
ALT SERPL-CCNC: 10 U/L (ref 0–40)
ANION GAP SERPL CALCULATED.3IONS-SCNC: 13 MMOL/L (ref 7–16)
ANTIBODY IDENTIFICATION: NORMAL
AST SERPL-CCNC: 15 U/L (ref 0–39)
BASOPHILS ABSOLUTE: 0.04 E9/L (ref 0–0.2)
BASOPHILS RELATIVE PERCENT: 0.4 % (ref 0–2)
BILIRUB SERPL-MCNC: 1 MG/DL (ref 0–1.2)
BLOOD BANK DISPENSE STATUS: NORMAL
BLOOD BANK DISPENSE STATUS: NORMAL
BLOOD BANK PRODUCT CODE: NORMAL
BLOOD BANK PRODUCT CODE: NORMAL
BPU ID: NORMAL
BPU ID: NORMAL
BUN BLDV-MCNC: 38 MG/DL (ref 8–23)
C ANTIGEN: NORMAL
CALCIUM SERPL-MCNC: 8.7 MG/DL (ref 8.6–10.2)
CHLORIDE BLD-SCNC: 108 MMOL/L (ref 98–107)
CO2: 23 MMOL/L (ref 22–29)
CREAT SERPL-MCNC: 1.5 MG/DL (ref 0.7–1.2)
DESCRIPTION BLOOD BANK: NORMAL
DESCRIPTION BLOOD BANK: NORMAL
E ANTIGEN: NORMAL
EKG ATRIAL RATE: 107 BPM
EKG Q-T INTERVAL: 412 MS
EKG QRS DURATION: 168 MS
EKG QTC CALCULATION (BAZETT): 534 MS
EKG R AXIS: -71 DEGREES
EKG T AXIS: 73 DEGREES
EKG VENTRICULAR RATE: 101 BPM
EOSINOPHILS ABSOLUTE: 0.22 E9/L (ref 0.05–0.5)
EOSINOPHILS RELATIVE PERCENT: 2.2 % (ref 0–6)
FOLATE: >20 NG/ML (ref 4.8–24.2)
GFR AFRICAN AMERICAN: 53
GFR NON-AFRICAN AMERICAN: 44 ML/MIN/1.73
GLUCOSE BLD-MCNC: 128 MG/DL (ref 74–99)
HCT VFR BLD CALC: 25.6 % (ref 37–54)
HCT VFR BLD CALC: 26.4 % (ref 37–54)
HCT VFR BLD CALC: 34.6 % (ref 37–54)
HEMOGLOBIN: 11.5 G/DL (ref 12.5–16.5)
HEMOGLOBIN: 8.1 G/DL (ref 12.5–16.5)
HEMOGLOBIN: 8.8 G/DL (ref 12.5–16.5)
IMMATURE GRANULOCYTES #: 0.06 E9/L
IMMATURE GRANULOCYTES %: 0.6 % (ref 0–5)
INR BLD: 2.5
LYMPHOCYTES ABSOLUTE: 1.29 E9/L (ref 1.5–4)
LYMPHOCYTES RELATIVE PERCENT: 12.8 % (ref 20–42)
MCH RBC QN AUTO: 33.9 PG (ref 26–35)
MCHC RBC AUTO-ENTMCNC: 33.2 % (ref 32–34.5)
MCV RBC AUTO: 102.1 FL (ref 80–99.9)
MONOCYTES ABSOLUTE: 0.81 E9/L (ref 0.1–0.95)
MONOCYTES RELATIVE PERCENT: 8 % (ref 2–12)
NEUTROPHILS ABSOLUTE: 7.66 E9/L (ref 1.8–7.3)
NEUTROPHILS RELATIVE PERCENT: 76 % (ref 43–80)
PDW BLD-RTO: 14.6 FL (ref 11.5–15)
PLATELET # BLD: 208 E9/L (ref 130–450)
PMV BLD AUTO: 9.8 FL (ref 7–12)
POTASSIUM REFLEX MAGNESIUM: 4.7 MMOL/L (ref 3.5–5)
PROTHROMBIN TIME: 28.1 SEC (ref 9.3–12.4)
RBC # BLD: 3.39 E12/L (ref 3.8–5.8)
SODIUM BLD-SCNC: 144 MMOL/L (ref 132–146)
TOTAL PROTEIN: 5.1 G/DL (ref 6.4–8.3)
VITAMIN B-12: 908 PG/ML (ref 211–946)
WBC # BLD: 10.1 E9/L (ref 4.5–11.5)

## 2018-12-21 PROCEDURE — 2500000003 HC RX 250 WO HCPCS: Performed by: INTERNAL MEDICINE

## 2018-12-21 PROCEDURE — 85610 PROTHROMBIN TIME: CPT

## 2018-12-21 PROCEDURE — 0DB68ZX EXCISION OF STOMACH, VIA NATURAL OR ARTIFICIAL OPENING ENDOSCOPIC, DIAGNOSTIC: ICD-10-PCS | Performed by: SURGERY

## 2018-12-21 PROCEDURE — 85018 HEMOGLOBIN: CPT

## 2018-12-21 PROCEDURE — P9016 RBC LEUKOCYTES REDUCED: HCPCS

## 2018-12-21 PROCEDURE — 85014 HEMATOCRIT: CPT

## 2018-12-21 PROCEDURE — 93005 ELECTROCARDIOGRAM TRACING: CPT | Performed by: CLINICAL NURSE SPECIALIST

## 2018-12-21 PROCEDURE — 3700000001 HC ADD 15 MINUTES (ANESTHESIA): Performed by: SURGERY

## 2018-12-21 PROCEDURE — 3609012400 HC EGD TRANSORAL BIOPSY SINGLE/MULTIPLE: Performed by: SURGERY

## 2018-12-21 PROCEDURE — 6370000000 HC RX 637 (ALT 250 FOR IP): Performed by: INTERNAL MEDICINE

## 2018-12-21 PROCEDURE — 6360000002 HC RX W HCPCS: Performed by: INTERNAL MEDICINE

## 2018-12-21 PROCEDURE — 88305 TISSUE EXAM BY PATHOLOGIST: CPT

## 2018-12-21 PROCEDURE — 82746 ASSAY OF FOLIC ACID SERUM: CPT

## 2018-12-21 PROCEDURE — 86902 BLOOD TYPE ANTIGEN DONOR EA: CPT

## 2018-12-21 PROCEDURE — 80053 COMPREHEN METABOLIC PANEL: CPT

## 2018-12-21 PROCEDURE — 7100000010 HC PHASE II RECOVERY - FIRST 15 MIN: Performed by: SURGERY

## 2018-12-21 PROCEDURE — 36430 TRANSFUSION BLD/BLD COMPNT: CPT

## 2018-12-21 PROCEDURE — 94660 CPAP INITIATION&MGMT: CPT

## 2018-12-21 PROCEDURE — 3700000000 HC ANESTHESIA ATTENDED CARE: Performed by: SURGERY

## 2018-12-21 PROCEDURE — 82607 VITAMIN B-12: CPT

## 2018-12-21 PROCEDURE — 2700000000 HC OXYGEN THERAPY PER DAY

## 2018-12-21 PROCEDURE — 6370000000 HC RX 637 (ALT 250 FOR IP): Performed by: SURGERY

## 2018-12-21 PROCEDURE — 7100000011 HC PHASE II RECOVERY - ADDTL 15 MIN: Performed by: SURGERY

## 2018-12-21 PROCEDURE — 2580000003 HC RX 258: Performed by: INTERNAL MEDICINE

## 2018-12-21 PROCEDURE — C9113 INJ PANTOPRAZOLE SODIUM, VIA: HCPCS | Performed by: INTERNAL MEDICINE

## 2018-12-21 PROCEDURE — 85025 COMPLETE CBC W/AUTO DIFF WBC: CPT

## 2018-12-21 PROCEDURE — 93010 ELECTROCARDIOGRAM REPORT: CPT | Performed by: INTERNAL MEDICINE

## 2018-12-21 PROCEDURE — APPSS60 APP SPLIT SHARED TIME 46-60 MINUTES: Performed by: CLINICAL NURSE SPECIALIST

## 2018-12-21 PROCEDURE — 2709999900 HC NON-CHARGEABLE SUPPLY: Performed by: SURGERY

## 2018-12-21 PROCEDURE — 99222 1ST HOSP IP/OBS MODERATE 55: CPT | Performed by: INTERNAL MEDICINE

## 2018-12-21 PROCEDURE — 6360000002 HC RX W HCPCS: Performed by: NURSE ANESTHETIST, CERTIFIED REGISTERED

## 2018-12-21 PROCEDURE — 2060000000 HC ICU INTERMEDIATE R&B

## 2018-12-21 PROCEDURE — 2580000003 HC RX 258: Performed by: NURSE ANESTHETIST, CERTIFIED REGISTERED

## 2018-12-21 PROCEDURE — 88342 IMHCHEM/IMCYTCHM 1ST ANTB: CPT

## 2018-12-21 PROCEDURE — 36415 COLL VENOUS BLD VENIPUNCTURE: CPT

## 2018-12-21 PROCEDURE — P9059 PLASMA, FRZ BETWEEN 8-24HOUR: HCPCS

## 2018-12-21 RX ORDER — ISOSORBIDE MONONITRATE 30 MG/1
30 TABLET, EXTENDED RELEASE ORAL DAILY
Status: DISCONTINUED | OUTPATIENT
Start: 2018-12-21 | End: 2018-12-24 | Stop reason: HOSPADM

## 2018-12-21 RX ORDER — PANTOPRAZOLE SODIUM 40 MG/1
40 TABLET, DELAYED RELEASE ORAL
Status: DISCONTINUED | OUTPATIENT
Start: 2018-12-22 | End: 2018-12-24 | Stop reason: HOSPADM

## 2018-12-21 RX ORDER — PROPOFOL 10 MG/ML
INJECTION, EMULSION INTRAVENOUS PRN
Status: DISCONTINUED | OUTPATIENT
Start: 2018-12-21 | End: 2018-12-21 | Stop reason: SDUPTHER

## 2018-12-21 RX ORDER — PANTOPRAZOLE SODIUM 40 MG/10ML
40 INJECTION, POWDER, LYOPHILIZED, FOR SOLUTION INTRAVENOUS 2 TIMES DAILY
Status: DISCONTINUED | OUTPATIENT
Start: 2018-12-21 | End: 2018-12-21

## 2018-12-21 RX ORDER — METOPROLOL SUCCINATE 50 MG/1
50 TABLET, EXTENDED RELEASE ORAL DAILY
Status: DISCONTINUED | OUTPATIENT
Start: 2018-12-21 | End: 2018-12-23

## 2018-12-21 RX ORDER — SUCRALFATE 1 G/1
1 TABLET ORAL EVERY 6 HOURS SCHEDULED
Status: DISCONTINUED | OUTPATIENT
Start: 2018-12-21 | End: 2018-12-24 | Stop reason: HOSPADM

## 2018-12-21 RX ORDER — AMOXICILLIN 250 MG/1
250 CAPSULE ORAL 2 TIMES DAILY
Status: DISCONTINUED | OUTPATIENT
Start: 2018-12-21 | End: 2018-12-24 | Stop reason: HOSPADM

## 2018-12-21 RX ORDER — 0.9 % SODIUM CHLORIDE 0.9 %
250 INTRAVENOUS SOLUTION INTRAVENOUS ONCE
Status: DISCONTINUED | OUTPATIENT
Start: 2018-12-21 | End: 2018-12-24 | Stop reason: HOSPADM

## 2018-12-21 RX ORDER — HYDRALAZINE HYDROCHLORIDE 10 MG/1
10 TABLET, FILM COATED ORAL EVERY 12 HOURS SCHEDULED
Status: DISCONTINUED | OUTPATIENT
Start: 2018-12-21 | End: 2018-12-24 | Stop reason: HOSPADM

## 2018-12-21 RX ORDER — SODIUM CHLORIDE 9 MG/ML
INJECTION, SOLUTION INTRAVENOUS CONTINUOUS PRN
Status: DISCONTINUED | OUTPATIENT
Start: 2018-12-21 | End: 2018-12-21 | Stop reason: SDUPTHER

## 2018-12-21 RX ORDER — DILTIAZEM HYDROCHLORIDE 5 MG/ML
10 INJECTION INTRAVENOUS ONCE
Status: COMPLETED | OUTPATIENT
Start: 2018-12-21 | End: 2018-12-21

## 2018-12-21 RX ADMIN — DILTIAZEM HYDROCHLORIDE 5 MG/HR: 5 INJECTION INTRAVENOUS at 08:12

## 2018-12-21 RX ADMIN — SUCRALFATE 1 G: 1 TABLET ORAL at 12:32

## 2018-12-21 RX ADMIN — MAGNESIUM 64 MG (MAGNESIUM CHLORIDE) TABLET,DELAYED RELEASE 64 MG: at 08:09

## 2018-12-21 RX ADMIN — FINASTERIDE 5 MG: 5 TABLET, FILM COATED ORAL at 17:14

## 2018-12-21 RX ADMIN — SERTRALINE HYDROCHLORIDE 25 MG: 50 TABLET ORAL at 08:09

## 2018-12-21 RX ADMIN — ISOSORBIDE MONONITRATE 30 MG: 30 TABLET, EXTENDED RELEASE ORAL at 12:32

## 2018-12-21 RX ADMIN — SODIUM CHLORIDE: 9 INJECTION, SOLUTION INTRAVENOUS at 11:23

## 2018-12-21 RX ADMIN — Medication 10 ML: at 08:08

## 2018-12-21 RX ADMIN — HYDRALAZINE HYDROCHLORIDE 10 MG: 10 TABLET, FILM COATED ORAL at 20:39

## 2018-12-21 RX ADMIN — PANTOPRAZOLE SODIUM 40 MG: 40 INJECTION, POWDER, FOR SOLUTION INTRAVENOUS at 08:09

## 2018-12-21 RX ADMIN — DICLOFENAC 2 G: 10 GEL TOPICAL at 08:08

## 2018-12-21 RX ADMIN — DILTIAZEM HYDROCHLORIDE 10 MG: 5 INJECTION INTRAVENOUS at 07:55

## 2018-12-21 RX ADMIN — HYDRALAZINE HYDROCHLORIDE 10 MG: 10 TABLET, FILM COATED ORAL at 12:32

## 2018-12-21 RX ADMIN — DONEPEZIL HYDROCHLORIDE 5 MG: 5 TABLET, FILM COATED ORAL at 20:39

## 2018-12-21 RX ADMIN — HYDROCODONE BITARTRATE AND ACETAMINOPHEN 1 TABLET: 5; 325 TABLET ORAL at 20:39

## 2018-12-21 RX ADMIN — PROPOFOL 80 MG: 10 INJECTION, EMULSION INTRAVENOUS at 11:26

## 2018-12-21 RX ADMIN — AMOXICILLIN 250 MG: 250 CAPSULE ORAL at 08:12

## 2018-12-21 RX ADMIN — AMOXICILLIN 250 MG: 250 CAPSULE ORAL at 20:39

## 2018-12-21 RX ADMIN — METOPROLOL SUCCINATE 50 MG: 50 TABLET, EXTENDED RELEASE ORAL at 12:32

## 2018-12-21 RX ADMIN — SUCRALFATE 1 G: 1 TABLET ORAL at 17:21

## 2018-12-21 RX ADMIN — TAMSULOSIN HYDROCHLORIDE 0.4 MG: 0.4 CAPSULE ORAL at 20:38

## 2018-12-21 RX ADMIN — TROSPIUM CHLORIDE 20 MG: 20 TABLET, FILM COATED ORAL at 20:38

## 2018-12-21 ASSESSMENT — PAIN DESCRIPTION - LOCATION: LOCATION: GENERALIZED

## 2018-12-21 ASSESSMENT — PAIN SCALES - GENERAL
PAINLEVEL_OUTOF10: 0
PAINLEVEL_OUTOF10: 6
PAINLEVEL_OUTOF10: 0

## 2018-12-21 ASSESSMENT — PAIN DESCRIPTION - PAIN TYPE: TYPE: OTHER (COMMENT)

## 2018-12-21 ASSESSMENT — PAIN DESCRIPTION - DESCRIPTORS: DESCRIPTORS: ACHING

## 2018-12-21 ASSESSMENT — PAIN DESCRIPTION - FREQUENCY: FREQUENCY: INTERMITTENT

## 2018-12-21 ASSESSMENT — PAIN DESCRIPTION - PROGRESSION: CLINICAL_PROGRESSION: NOT CHANGED

## 2018-12-21 ASSESSMENT — PAIN DESCRIPTION - ONSET: ONSET: GRADUAL

## 2018-12-21 NOTE — CONSULTS
60%.  9. Echo, 02/07/2008. EF 60%, no PHTN. LA 3.5, LVH, RVSP 36.    10. BPH.  11. [place] [facility] [place]. Course complicated by PE (corpectomy T4-L1 with L1-2 fusion graft performed). 12. IVC filter (per patient). 13. [facility] readmission, 02/2008 for CHF. Pleural effusions. 14. Five week rehabilitation at [place] [address]. Readmitted [facility] for dyspnea. Recurrent PE excluded, 04/2008. 15. Patient denies diabetes mellitus saying hyperglycemia transient post op. 12. Family history positive for MI.  17. Allergy to morphine, Bactrim DS and Percocet. 18. Chronic anticoagulation with Coumadin. 19. Nephrolithiasis, 1998.    20. Echo, 05/15/2008. LVE, no LVH, LVEF normal, KERA, mild-moderate TR with moderate PHTN. RVSP 60 mmHg. Trivial pericardial fluid. 21. Dobutamine MPS, 05/19/2008. No chest pain or ST changes from baseline RBBB. Gated images normal, EF 73%, no TID. Small to moderate sized area of mild basal lateral ischemia. 22. [place] [facility] [place]. 23. [address], [facility], 10/17/2008. Normal sinus mechanism without complications achieved. 24. Prostate laser surgery, Dr. Libby Allen, 11/30/2009.  25. Obesity. BMI 33.41 - 08/2015. 26. Adenosine MPS, 06/22/2010 with no chest pain or ST changes. Gated images normal, EF 67%. Borderline TID. Moderate sized area of equivocal basal lateral ischemia. 27. Repeat lipid panel on Niaspan 1000 mg. Total cholesterol 174, triglycerides 283, HDL 33, LDL 84.  28. Echo, 11/22/2010. LV dilatation. Mild concentric LVH. No regional wall motion abnormalities. EF normal.  Stage I diastolic dysfunction. TDI showed no LA or LVEDP pressure elevation. Moderate LAE. Marked HAJA. Mild RVE. No hemodynamically significant valvular abnormalities. RVSP 55. Aortic root 4.1.    29. Echo, 01/30/2012. Markedly dilated LV. Mild concentric LVH.   LV regional wall motion and systolic function normal.   Tissue Doppler indicates normal LV filling pressures. LA moderately dilated, marked HAJA. Mild MR, mild TR.  RVSP 50-55 mmHg. 30. DCCV, 02/07/2012 utilizing AP pads. Patient received a single 100 watt second shock and converted immediately to sinus rhythm. 31. OP evaluation, 09/13/2012. NYHA Class II. EKG shows AF with ventricular rate 64, RBBB and left axis deviation. 32. Head CT, 09/03/2013. Moderate to severe diffuse cerebral atrophy. [facility] [place] a [place] the [place], 06/28/2014. CBC normal.  Lytes normal.  BUN 22, Cr 1.3. Pulse 98. /69. Probable mechanical fall. 34. Head CT, 05/26/2014. No acute process. Mild white matter changes. Mild volume loss. 35. Labs, 07/2014. BUN 19, Cr 1.9, GFR 50. Lytes normal.  36. 24-hour ambulatory monitor, 07/2014. AF. Isolated PVCs. Average rate 67. Longest RR cycle 2.2 seconds. 37. [facility] admission, 06/06/2015 for atypical chest pain. Tn negative. [address] MPS, 06/07/2015. No reversible defects to suggest ischemia. EF 67%. 39. Echo, 06/07/2015 (Dr. Brittany Holly). EF normal.  Mild RV, RA and LAE. Moderate MR.  RVSP 63. PHTN moderate. 40. CVA prophylaxis with [facility] [address] (Coumadin) 03/2016. 41. Echo 06/07/2015. Normal EF.  LVH. RVSP 63. EF 65%. 42. Hospitalized 08/17/2017 with fatigue, weakness and dyspnea. hx poor oral intake. Had lactic acidosis and acute kidney injury and an elevated digoxin level (dig level 2.3, PRO-BNP= 655 with BUN= 36 and creatinine= 1.7).  He was volume expanded.  Digoxin was stopped.    43. S/p resection of plantar fascitis muscle, right foot 7/2018  44. Echocardiogram 12/18/2018 (Dr. Chuy Tierney).  Diastolic dysfunction.  Ejection fraction is visually estimated at 35-45%.  LV global hypokinesis.   The left atrium is severely dilated.  Atrial fibrillation.  L atrial pressure elevated.  Mild to moderate mitral regurgitation with central jet   Mild aortic regurgitation.  Pulmonary hypertension is

## 2018-12-21 NOTE — ED NOTES
Attempted to do Orthostatic BP, patient became dizzy when sitting up. Patient had a large amount of rectal bleeding, that I did clean up and doctor was notified.      Gilford Space, EUSEBIO  12/20/18 0758

## 2018-12-21 NOTE — CARE COORDINATION
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 PT  CURRENTLY  RECEIVING  BLOOD  TRANSFUSION; STATES  HE IS  FEELING  BETTER. EGD  DONE  TODAY. STATES  HE  IS  PLANNING  TO  RETURN HOME  AT  DISCHARGE. AWARE OF  CURRENT  TREATMENT PLAN;WILL  FOLLOW  ALONG  WITH  SOCIAL  WORK  FOR  ANY  ADDITIONAL  NEEDS. Joey Young RN,CM.

## 2018-12-21 NOTE — CONSULTS
periphery wound area left leg. No exposed bone, tendon, and/or ligamentous structures noted. No ascending cellulitis noted and/or lymphadenopathy to left lower extremity.         Labs:  Lab Results       Component                Value               Date                       WBC                      10.1                12/21/2018                 HCT                      25.6 (L)            12/21/2018                 HGB                      8.1 (L)             12/21/2018                 PLT                      208                 12/21/2018                 NA                       144                 12/21/2018                 K                        4.7                 12/21/2018                 CL                       108 (H)             12/21/2018                 CO2                      23                  12/21/2018                 BUN                      38 (H)              12/21/2018                 CREATININE               1.5 (H)             12/21/2018                 GLUCOSE                  128 (H)             12/21/2018            CBC: Lab Results       Component                Value               Date                       WBC                      10.1                12/21/2018                 RBC                      3.39                12/21/2018                 HGB                      8.1                 12/21/2018                 HCT                      25.6                12/21/2018                 MCV                      102.1               12/21/2018                 MCH                      33.9                12/21/2018                 MCHC                     33.2                12/21/2018                 RDW                      14.6                12/21/2018                 PLT                      208                 12/21/2018                 MPV                      9.8                 12/21/2018            Hemoglobin/Hematocrit:  Lab Results       Component                Value

## 2018-12-21 NOTE — H&P
[Z79.01]     Priority: Medium    Atrial fibrillation [I48.91]     Priority: Medium    CAD (coronary artery disease) [I25.10]    Dementia [F03.90]    COPD (chronic obstructive pulmonary disease) ([facility]) [J44.9]    Chronic diastolic CHF (congestive heart failure) ([facility]) [I50.32]    Hypertension [I10]    Non-insulin dependent type 2 diabetes mellitus ([facility]) [E11.9]    MAX (obstructive sleep apnea) [G47.33]    Moderate mitral regurgitation [I34.0]    Pulmonary hypertension ([facility]) [I27.20]    Mixed hyperlipidemia [E78.2]       Patient Active Problem List    Diagnosis Date Noted    GI bleed 12/20/2018     Priority: High    Chronic anticoagulation      Priority: Medium    Atrial fibrillation      Priority: Medium    CAD (coronary artery disease)     COPD (chronic obstructive pulmonary disease) ([facility])      STABLE, SOB WITH EXERTION      Dementia     CKD (chronic kidney disease) stage 3, GFR 30-59 ml/min ([facility])     BPH (benign prostatic hyperplasia)     Chronic diastolic CHF (congestive heart failure) [facility]     Non-insulin dependent type 2 diabetes mellitus ([facility]     Hypertension     MAX (obstructive sleep apnea)      USES C PAP      Moderate mitral regurgitation 11/30/2016    Pulmonary hypertension ([facility] [facility] 11/30/2016    Mixed hyperlipidemia 06/06/2015    RBBB        Plan  Acute lower GI bleed without acute blood loss anemia: General surgery consultation--case discussed-- EGD soon. Continue Rho. PPI. Follow H&H--pRBC if Hb < 7-- 2 units pRBC on hold. NPO. IVF hydration. Hold coumadin/ASA. FFP/vitamin K given in the ED. IVF-- hold Lasix. Atrial fib w/ RVR: Hold coumadin. IV Cardizem-- bolus and infusion. Telemetry. Cardiology consult. Left LLE traumatic wound: Podiatry consult. Continue Amoxilcillin. Continue home medications other than coumadin and ASA. Follow labs  DVT prophylaxis. Please see orders for further management and care.    for discharge planning  Discharge plan:

## 2018-12-22 LAB
ALBUMIN SERPL-MCNC: 3.2 G/DL (ref 3.5–5.2)
ALP BLD-CCNC: 53 U/L (ref 40–129)
ALT SERPL-CCNC: 12 U/L (ref 0–40)
ANION GAP SERPL CALCULATED.3IONS-SCNC: 10 MMOL/L (ref 7–16)
AST SERPL-CCNC: 20 U/L (ref 0–39)
BASOPHILS ABSOLUTE: 0.04 E9/L (ref 0–0.2)
BASOPHILS RELATIVE PERCENT: 0.4 % (ref 0–2)
BILIRUB SERPL-MCNC: 0.7 MG/DL (ref 0–1.2)
BUN BLDV-MCNC: 31 MG/DL (ref 8–23)
CALCIUM SERPL-MCNC: 8.4 MG/DL (ref 8.6–10.2)
CHLORIDE BLD-SCNC: 108 MMOL/L (ref 98–107)
CO2: 23 MMOL/L (ref 22–29)
CREAT SERPL-MCNC: 1.2 MG/DL (ref 0.7–1.2)
EOSINOPHILS ABSOLUTE: 0.8 E9/L (ref 0.05–0.5)
EOSINOPHILS RELATIVE PERCENT: 8 % (ref 0–6)
GFR AFRICAN AMERICAN: >60
GFR NON-AFRICAN AMERICAN: 57 ML/MIN/1.73
GLUCOSE BLD-MCNC: 93 MG/DL (ref 74–99)
HCT VFR BLD CALC: 27.3 % (ref 37–54)
HCT VFR BLD CALC: 29.8 % (ref 37–54)
HCT VFR BLD CALC: 30.7 % (ref 37–54)
HEMOGLOBIN: 10 G/DL (ref 12.5–16.5)
HEMOGLOBIN: 8.9 G/DL (ref 12.5–16.5)
HEMOGLOBIN: 9.8 G/DL (ref 12.5–16.5)
IMMATURE GRANULOCYTES #: 0.11 E9/L
IMMATURE GRANULOCYTES %: 1.1 % (ref 0–5)
INR BLD: 1.2
LYMPHOCYTES ABSOLUTE: 1.68 E9/L (ref 1.5–4)
LYMPHOCYTES RELATIVE PERCENT: 16.8 % (ref 20–42)
MCH RBC QN AUTO: 32.1 PG (ref 26–35)
MCHC RBC AUTO-ENTMCNC: 32.9 % (ref 32–34.5)
MCV RBC AUTO: 97.7 FL (ref 80–99.9)
MONOCYTES ABSOLUTE: 1.01 E9/L (ref 0.1–0.95)
MONOCYTES RELATIVE PERCENT: 10.1 % (ref 2–12)
NEUTROPHILS ABSOLUTE: 6.35 E9/L (ref 1.8–7.3)
NEUTROPHILS RELATIVE PERCENT: 63.6 % (ref 43–80)
PDW BLD-RTO: 18.4 FL (ref 11.5–15)
PLATELET # BLD: 150 E9/L (ref 130–450)
PMV BLD AUTO: 10 FL (ref 7–12)
POTASSIUM REFLEX MAGNESIUM: 4.1 MMOL/L (ref 3.5–5)
PROTHROMBIN TIME: 14.1 SEC (ref 9.3–12.4)
RBC # BLD: 3.05 E12/L (ref 3.8–5.8)
SODIUM BLD-SCNC: 141 MMOL/L (ref 132–146)
TOTAL PROTEIN: 5.1 G/DL (ref 6.4–8.3)
WBC # BLD: 10 E9/L (ref 4.5–11.5)

## 2018-12-22 PROCEDURE — 94660 CPAP INITIATION&MGMT: CPT

## 2018-12-22 PROCEDURE — 6370000000 HC RX 637 (ALT 250 FOR IP): Performed by: INTERNAL MEDICINE

## 2018-12-22 PROCEDURE — 80053 COMPREHEN METABOLIC PANEL: CPT

## 2018-12-22 PROCEDURE — 36415 COLL VENOUS BLD VENIPUNCTURE: CPT

## 2018-12-22 PROCEDURE — 2580000003 HC RX 258: Performed by: CLINICAL NURSE SPECIALIST

## 2018-12-22 PROCEDURE — 2580000003 HC RX 258: Performed by: INTERNAL MEDICINE

## 2018-12-22 PROCEDURE — 85018 HEMOGLOBIN: CPT

## 2018-12-22 PROCEDURE — 85025 COMPLETE CBC W/AUTO DIFF WBC: CPT

## 2018-12-22 PROCEDURE — 85610 PROTHROMBIN TIME: CPT

## 2018-12-22 PROCEDURE — 6370000000 HC RX 637 (ALT 250 FOR IP): Performed by: SURGERY

## 2018-12-22 PROCEDURE — 85014 HEMATOCRIT: CPT

## 2018-12-22 PROCEDURE — 99233 SBSQ HOSP IP/OBS HIGH 50: CPT | Performed by: CLINICAL NURSE SPECIALIST

## 2018-12-22 PROCEDURE — 2060000000 HC ICU INTERMEDIATE R&B

## 2018-12-22 RX ADMIN — TROSPIUM CHLORIDE 20 MG: 20 TABLET, FILM COATED ORAL at 20:45

## 2018-12-22 RX ADMIN — SUCRALFATE 1 G: 1 TABLET ORAL at 23:55

## 2018-12-22 RX ADMIN — DONEPEZIL HYDROCHLORIDE 5 MG: 5 TABLET, FILM COATED ORAL at 20:45

## 2018-12-22 RX ADMIN — SUCRALFATE 1 G: 1 TABLET ORAL at 17:24

## 2018-12-22 RX ADMIN — METOPROLOL SUCCINATE 50 MG: 50 TABLET, EXTENDED RELEASE ORAL at 09:11

## 2018-12-22 RX ADMIN — PANTOPRAZOLE SODIUM 40 MG: 40 TABLET, DELAYED RELEASE ORAL at 06:00

## 2018-12-22 RX ADMIN — ISOSORBIDE MONONITRATE 30 MG: 30 TABLET, EXTENDED RELEASE ORAL at 09:11

## 2018-12-22 RX ADMIN — SERTRALINE HYDROCHLORIDE 25 MG: 50 TABLET ORAL at 09:11

## 2018-12-22 RX ADMIN — AMOXICILLIN 250 MG: 250 CAPSULE ORAL at 09:11

## 2018-12-22 RX ADMIN — DICLOFENAC 2 G: 10 GEL TOPICAL at 09:12

## 2018-12-22 RX ADMIN — SUCRALFATE 1 G: 1 TABLET ORAL at 06:00

## 2018-12-22 RX ADMIN — SUCRALFATE 1 G: 1 TABLET ORAL at 12:15

## 2018-12-22 RX ADMIN — AMOXICILLIN 250 MG: 250 CAPSULE ORAL at 20:45

## 2018-12-22 RX ADMIN — TAMSULOSIN HYDROCHLORIDE 0.4 MG: 0.4 CAPSULE ORAL at 20:45

## 2018-12-22 RX ADMIN — Medication 10 ML: at 09:12

## 2018-12-22 RX ADMIN — SUCRALFATE 1 G: 1 TABLET ORAL at 00:49

## 2018-12-22 RX ADMIN — HYDRALAZINE HYDROCHLORIDE 10 MG: 10 TABLET, FILM COATED ORAL at 09:11

## 2018-12-22 RX ADMIN — HYDROCODONE BITARTRATE AND ACETAMINOPHEN 1 TABLET: 5; 325 TABLET ORAL at 06:00

## 2018-12-22 RX ADMIN — HYDRALAZINE HYDROCHLORIDE 10 MG: 10 TABLET, FILM COATED ORAL at 20:45

## 2018-12-22 RX ADMIN — MAGNESIUM 64 MG (MAGNESIUM CHLORIDE) TABLET,DELAYED RELEASE 64 MG: at 09:11

## 2018-12-22 RX ADMIN — SODIUM CHLORIDE: 9 INJECTION, SOLUTION INTRAVENOUS at 16:10

## 2018-12-22 RX ADMIN — FINASTERIDE 5 MG: 5 TABLET, FILM COATED ORAL at 17:24

## 2018-12-22 ASSESSMENT — PAIN DESCRIPTION - LOCATION: LOCATION: GENERALIZED

## 2018-12-22 ASSESSMENT — PAIN DESCRIPTION - FREQUENCY: FREQUENCY: INTERMITTENT

## 2018-12-22 ASSESSMENT — PAIN SCALES - GENERAL
PAINLEVEL_OUTOF10: 7
PAINLEVEL_OUTOF10: 0
PAINLEVEL_OUTOF10: 0

## 2018-12-22 ASSESSMENT — PAIN DESCRIPTION - DESCRIPTORS: DESCRIPTORS: ACHING

## 2018-12-23 LAB
ALBUMIN SERPL-MCNC: 3.3 G/DL (ref 3.5–5.2)
ALP BLD-CCNC: 64 U/L (ref 40–129)
ALT SERPL-CCNC: 14 U/L (ref 0–40)
ANION GAP SERPL CALCULATED.3IONS-SCNC: 5 MMOL/L (ref 7–16)
AST SERPL-CCNC: 19 U/L (ref 0–39)
BASOPHILS ABSOLUTE: 0.04 E9/L (ref 0–0.2)
BASOPHILS RELATIVE PERCENT: 0.4 % (ref 0–2)
BILIRUB SERPL-MCNC: 0.3 MG/DL (ref 0–1.2)
BLOOD BANK DISPENSE STATUS: NORMAL
BLOOD BANK PRODUCT CODE: NORMAL
BPU ID: NORMAL
BUN BLDV-MCNC: 24 MG/DL (ref 8–23)
CALCIUM SERPL-MCNC: 8.6 MG/DL (ref 8.6–10.2)
CHLORIDE BLD-SCNC: 114 MMOL/L (ref 98–107)
CO2: 24 MMOL/L (ref 22–29)
CREAT SERPL-MCNC: 1.1 MG/DL (ref 0.7–1.2)
DESCRIPTION BLOOD BANK: NORMAL
EOSINOPHILS ABSOLUTE: 0.95 E9/L (ref 0.05–0.5)
EOSINOPHILS RELATIVE PERCENT: 8.8 % (ref 0–6)
GFR AFRICAN AMERICAN: >60
GFR NON-AFRICAN AMERICAN: >60 ML/MIN/1.73
GLUCOSE BLD-MCNC: 106 MG/DL (ref 74–99)
HCT VFR BLD CALC: 26.2 % (ref 37–54)
HCT VFR BLD CALC: 28.1 % (ref 37–54)
HCT VFR BLD CALC: 29.4 % (ref 37–54)
HCT VFR BLD CALC: 29.9 % (ref 37–54)
HCT VFR BLD CALC: 30 % (ref 37–54)
HEMOGLOBIN: 8.7 G/DL (ref 12.5–16.5)
HEMOGLOBIN: 9.3 G/DL (ref 12.5–16.5)
HEMOGLOBIN: 9.4 G/DL (ref 12.5–16.5)
HEMOGLOBIN: 9.8 G/DL (ref 12.5–16.5)
HEMOGLOBIN: 9.8 G/DL (ref 12.5–16.5)
IMMATURE GRANULOCYTES #: 0.14 E9/L
IMMATURE GRANULOCYTES %: 1.3 % (ref 0–5)
INR BLD: 1.2
LYMPHOCYTES ABSOLUTE: 1.45 E9/L (ref 1.5–4)
LYMPHOCYTES RELATIVE PERCENT: 13.5 % (ref 20–42)
MCH RBC QN AUTO: 32.9 PG (ref 26–35)
MCHC RBC AUTO-ENTMCNC: 32.7 % (ref 32–34.5)
MCV RBC AUTO: 100.7 FL (ref 80–99.9)
MONOCYTES ABSOLUTE: 1.08 E9/L (ref 0.1–0.95)
MONOCYTES RELATIVE PERCENT: 10 % (ref 2–12)
NEUTROPHILS ABSOLUTE: 7.11 E9/L (ref 1.8–7.3)
NEUTROPHILS RELATIVE PERCENT: 66 % (ref 43–80)
PDW BLD-RTO: 18.2 FL (ref 11.5–15)
PLATELET # BLD: 146 E9/L (ref 130–450)
PMV BLD AUTO: 9.7 FL (ref 7–12)
POTASSIUM REFLEX MAGNESIUM: 4 MMOL/L (ref 3.5–5)
PROTHROMBIN TIME: 13.5 SEC (ref 9.3–12.4)
RBC # BLD: 2.98 E12/L (ref 3.8–5.8)
SODIUM BLD-SCNC: 143 MMOL/L (ref 132–146)
TOTAL PROTEIN: 5.2 G/DL (ref 6.4–8.3)
WBC # BLD: 10.8 E9/L (ref 4.5–11.5)

## 2018-12-23 PROCEDURE — 85610 PROTHROMBIN TIME: CPT

## 2018-12-23 PROCEDURE — 80053 COMPREHEN METABOLIC PANEL: CPT

## 2018-12-23 PROCEDURE — 6370000000 HC RX 637 (ALT 250 FOR IP): Performed by: INTERNAL MEDICINE

## 2018-12-23 PROCEDURE — 36415 COLL VENOUS BLD VENIPUNCTURE: CPT

## 2018-12-23 PROCEDURE — 2580000003 HC RX 258: Performed by: INTERNAL MEDICINE

## 2018-12-23 PROCEDURE — 85018 HEMOGLOBIN: CPT

## 2018-12-23 PROCEDURE — 94660 CPAP INITIATION&MGMT: CPT

## 2018-12-23 PROCEDURE — 2060000000 HC ICU INTERMEDIATE R&B

## 2018-12-23 PROCEDURE — 85025 COMPLETE CBC W/AUTO DIFF WBC: CPT

## 2018-12-23 PROCEDURE — 6370000000 HC RX 637 (ALT 250 FOR IP): Performed by: SURGERY

## 2018-12-23 PROCEDURE — 85014 HEMATOCRIT: CPT

## 2018-12-23 PROCEDURE — 2580000003 HC RX 258: Performed by: CLINICAL NURSE SPECIALIST

## 2018-12-23 RX ORDER — METOPROLOL SUCCINATE 25 MG/1
25 TABLET, EXTENDED RELEASE ORAL DAILY
Status: DISCONTINUED | OUTPATIENT
Start: 2018-12-23 | End: 2018-12-24 | Stop reason: HOSPADM

## 2018-12-23 RX ADMIN — AMOXICILLIN 250 MG: 250 CAPSULE ORAL at 21:13

## 2018-12-23 RX ADMIN — DICLOFENAC 2 G: 10 GEL TOPICAL at 09:29

## 2018-12-23 RX ADMIN — SUCRALFATE 1 G: 1 TABLET ORAL at 12:11

## 2018-12-23 RX ADMIN — DONEPEZIL HYDROCHLORIDE 5 MG: 5 TABLET, FILM COATED ORAL at 21:13

## 2018-12-23 RX ADMIN — MAGNESIUM 64 MG (MAGNESIUM CHLORIDE) TABLET,DELAYED RELEASE 64 MG: at 09:28

## 2018-12-23 RX ADMIN — TAMSULOSIN HYDROCHLORIDE 0.4 MG: 0.4 CAPSULE ORAL at 21:13

## 2018-12-23 RX ADMIN — SUCRALFATE 1 G: 1 TABLET ORAL at 17:37

## 2018-12-23 RX ADMIN — TROSPIUM CHLORIDE 20 MG: 20 TABLET, FILM COATED ORAL at 21:13

## 2018-12-23 RX ADMIN — Medication 10 ML: at 21:13

## 2018-12-23 RX ADMIN — SUCRALFATE 1 G: 1 TABLET ORAL at 06:10

## 2018-12-23 RX ADMIN — AMOXICILLIN 250 MG: 250 CAPSULE ORAL at 09:28

## 2018-12-23 RX ADMIN — HYDRALAZINE HYDROCHLORIDE 10 MG: 10 TABLET, FILM COATED ORAL at 09:29

## 2018-12-23 RX ADMIN — SODIUM CHLORIDE: 9 INJECTION, SOLUTION INTRAVENOUS at 14:19

## 2018-12-23 RX ADMIN — FINASTERIDE 5 MG: 5 TABLET, FILM COATED ORAL at 17:37

## 2018-12-23 RX ADMIN — PANTOPRAZOLE SODIUM 40 MG: 40 TABLET, DELAYED RELEASE ORAL at 06:10

## 2018-12-23 RX ADMIN — SUCRALFATE 1 G: 1 TABLET ORAL at 23:59

## 2018-12-23 RX ADMIN — Medication 10 ML: at 09:29

## 2018-12-23 RX ADMIN — SERTRALINE HYDROCHLORIDE 25 MG: 50 TABLET ORAL at 09:28

## 2018-12-23 RX ADMIN — METOPROLOL SUCCINATE 25 MG: 25 TABLET, EXTENDED RELEASE ORAL at 09:28

## 2018-12-23 RX ADMIN — ISOSORBIDE MONONITRATE 30 MG: 30 TABLET, EXTENDED RELEASE ORAL at 09:28

## 2018-12-23 RX ADMIN — HYDRALAZINE HYDROCHLORIDE 10 MG: 10 TABLET, FILM COATED ORAL at 21:13

## 2018-12-23 ASSESSMENT — PAIN SCALES - GENERAL
PAINLEVEL_OUTOF10: 0

## 2018-12-24 VITALS
BODY MASS INDEX: 32.77 KG/M2 | OXYGEN SATURATION: 100 % | HEART RATE: 103 BPM | SYSTOLIC BLOOD PRESSURE: 139 MMHG | DIASTOLIC BLOOD PRESSURE: 86 MMHG | TEMPERATURE: 97.5 F | HEIGHT: 65 IN | RESPIRATION RATE: 20 BRPM | WEIGHT: 196.69 LBS

## 2018-12-24 LAB
ALBUMIN SERPL-MCNC: 3.2 G/DL (ref 3.5–5.2)
ALP BLD-CCNC: 61 U/L (ref 40–129)
ALT SERPL-CCNC: 13 U/L (ref 0–40)
ANION GAP SERPL CALCULATED.3IONS-SCNC: 6 MMOL/L (ref 7–16)
AST SERPL-CCNC: 21 U/L (ref 0–39)
BASOPHILS ABSOLUTE: 0.04 E9/L (ref 0–0.2)
BASOPHILS RELATIVE PERCENT: 0.4 % (ref 0–2)
BILIRUB SERPL-MCNC: 0.4 MG/DL (ref 0–1.2)
BLOOD BANK DISPENSE STATUS: NORMAL
BLOOD BANK DISPENSE STATUS: NORMAL
BLOOD BANK PRODUCT CODE: NORMAL
BLOOD BANK PRODUCT CODE: NORMAL
BPU ID: NORMAL
BPU ID: NORMAL
BUN BLDV-MCNC: 16 MG/DL (ref 8–23)
CALCIUM SERPL-MCNC: 8.2 MG/DL (ref 8.6–10.2)
CHLORIDE BLD-SCNC: 115 MMOL/L (ref 98–107)
CO2: 24 MMOL/L (ref 22–29)
CREAT SERPL-MCNC: 1 MG/DL (ref 0.7–1.2)
DESCRIPTION BLOOD BANK: NORMAL
DESCRIPTION BLOOD BANK: NORMAL
EOSINOPHILS ABSOLUTE: 1.13 E9/L (ref 0.05–0.5)
EOSINOPHILS RELATIVE PERCENT: 11.6 % (ref 0–6)
GFR AFRICAN AMERICAN: >60
GFR NON-AFRICAN AMERICAN: >60 ML/MIN/1.73
GLUCOSE BLD-MCNC: 97 MG/DL (ref 74–99)
HCT VFR BLD CALC: 30 % (ref 37–54)
HEMOGLOBIN: 9.6 G/DL (ref 12.5–16.5)
IMMATURE GRANULOCYTES #: 0.12 E9/L
IMMATURE GRANULOCYTES %: 1.2 % (ref 0–5)
INR BLD: 1.2
LYMPHOCYTES ABSOLUTE: 1.21 E9/L (ref 1.5–4)
LYMPHOCYTES RELATIVE PERCENT: 12.4 % (ref 20–42)
MCH RBC QN AUTO: 32.5 PG (ref 26–35)
MCHC RBC AUTO-ENTMCNC: 32 % (ref 32–34.5)
MCV RBC AUTO: 101.7 FL (ref 80–99.9)
MONOCYTES ABSOLUTE: 0.96 E9/L (ref 0.1–0.95)
MONOCYTES RELATIVE PERCENT: 9.8 % (ref 2–12)
NEUTROPHILS ABSOLUTE: 6.29 E9/L (ref 1.8–7.3)
NEUTROPHILS RELATIVE PERCENT: 64.6 % (ref 43–80)
PDW BLD-RTO: 17.7 FL (ref 11.5–15)
PLATELET # BLD: 156 E9/L (ref 130–450)
PMV BLD AUTO: 9.2 FL (ref 7–12)
POTASSIUM REFLEX MAGNESIUM: 4 MMOL/L (ref 3.5–5)
PROTHROMBIN TIME: 14.3 SEC (ref 9.3–12.4)
RBC # BLD: 2.95 E12/L (ref 3.8–5.8)
SODIUM BLD-SCNC: 145 MMOL/L (ref 132–146)
TOTAL PROTEIN: 5.1 G/DL (ref 6.4–8.3)
WBC # BLD: 9.8 E9/L (ref 4.5–11.5)

## 2018-12-24 PROCEDURE — 36415 COLL VENOUS BLD VENIPUNCTURE: CPT

## 2018-12-24 PROCEDURE — 80053 COMPREHEN METABOLIC PANEL: CPT

## 2018-12-24 PROCEDURE — 85610 PROTHROMBIN TIME: CPT

## 2018-12-24 PROCEDURE — 6370000000 HC RX 637 (ALT 250 FOR IP): Performed by: CLINICAL NURSE SPECIALIST

## 2018-12-24 PROCEDURE — 6370000000 HC RX 637 (ALT 250 FOR IP): Performed by: SURGERY

## 2018-12-24 PROCEDURE — 6370000000 HC RX 637 (ALT 250 FOR IP): Performed by: INTERNAL MEDICINE

## 2018-12-24 PROCEDURE — 94660 CPAP INITIATION&MGMT: CPT

## 2018-12-24 PROCEDURE — APPSS30 APP SPLIT SHARED TIME 16-30 MINUTES: Performed by: CLINICAL NURSE SPECIALIST

## 2018-12-24 PROCEDURE — 2580000003 HC RX 258: Performed by: INTERNAL MEDICINE

## 2018-12-24 PROCEDURE — 85025 COMPLETE CBC W/AUTO DIFF WBC: CPT

## 2018-12-24 PROCEDURE — 99232 SBSQ HOSP IP/OBS MODERATE 35: CPT | Performed by: INTERNAL MEDICINE

## 2018-12-24 RX ORDER — ISOSORBIDE MONONITRATE 30 MG/1
30 TABLET, EXTENDED RELEASE ORAL DAILY
Qty: 30 TABLET | Refills: 3 | Status: SHIPPED | OUTPATIENT
Start: 2018-12-24 | End: 2019-04-23 | Stop reason: SDUPTHER

## 2018-12-24 RX ORDER — FUROSEMIDE 40 MG/1
40 TABLET ORAL DAILY
Status: DISCONTINUED | OUTPATIENT
Start: 2018-12-24 | End: 2018-12-24 | Stop reason: HOSPADM

## 2018-12-24 RX ORDER — PANTOPRAZOLE SODIUM 40 MG/1
40 TABLET, DELAYED RELEASE ORAL
Qty: 30 TABLET | Refills: 3 | Status: SHIPPED | OUTPATIENT
Start: 2018-12-25 | End: 2019-01-01 | Stop reason: SDUPTHER

## 2018-12-24 RX ORDER — METOPROLOL SUCCINATE 25 MG/1
25 TABLET, EXTENDED RELEASE ORAL DAILY
Qty: 30 TABLET | Refills: 3 | Status: SHIPPED | OUTPATIENT
Start: 2018-12-24 | End: 2019-02-14 | Stop reason: SDUPTHER

## 2018-12-24 RX ORDER — SUCRALFATE 1 G/1
1 TABLET ORAL 4 TIMES DAILY
Qty: 120 TABLET | Refills: 3 | Status: SHIPPED | OUTPATIENT
Start: 2018-12-24 | End: 2019-05-15 | Stop reason: ALTCHOICE

## 2018-12-24 RX ADMIN — SUCRALFATE 1 G: 1 TABLET ORAL at 11:54

## 2018-12-24 RX ADMIN — MAGNESIUM 64 MG (MAGNESIUM CHLORIDE) TABLET,DELAYED RELEASE 64 MG: at 09:37

## 2018-12-24 RX ADMIN — METOPROLOL SUCCINATE 25 MG: 25 TABLET, EXTENDED RELEASE ORAL at 09:38

## 2018-12-24 RX ADMIN — Medication 10 ML: at 09:38

## 2018-12-24 RX ADMIN — SERTRALINE HYDROCHLORIDE 25 MG: 50 TABLET ORAL at 09:37

## 2018-12-24 RX ADMIN — FUROSEMIDE 40 MG: 40 TABLET ORAL at 11:54

## 2018-12-24 RX ADMIN — AMOXICILLIN 250 MG: 250 CAPSULE ORAL at 09:37

## 2018-12-24 RX ADMIN — DICLOFENAC 2 G: 10 GEL TOPICAL at 09:38

## 2018-12-24 RX ADMIN — SUCRALFATE 1 G: 1 TABLET ORAL at 06:01

## 2018-12-24 RX ADMIN — PANTOPRAZOLE SODIUM 40 MG: 40 TABLET, DELAYED RELEASE ORAL at 06:01

## 2018-12-24 RX ADMIN — ISOSORBIDE MONONITRATE 30 MG: 30 TABLET, EXTENDED RELEASE ORAL at 09:37

## 2018-12-24 RX ADMIN — HYDRALAZINE HYDROCHLORIDE 10 MG: 10 TABLET, FILM COATED ORAL at 09:39

## 2018-12-24 ASSESSMENT — PAIN SCALES - GENERAL
PAINLEVEL_OUTOF10: 0
PAINLEVEL_OUTOF10: 0

## 2018-12-24 NOTE — PROGRESS NOTES
24 hr chart check complete.
Dr. Xenia Vega notified of consult. Notified surgical resident.
Patient seen by surgical resident this AM.
Surgery resident and Dr. Gail Sunshine in to see patient and updated on events over night, md ware antibodies in [facility] delayed availability of FFP's unit 3&4 thawing in lab at this time,waiting availability see MD progress notes.
06/07/2015    LDLCALC 35 06/07/2015    TRIG 179 06/07/2015     LIVER PROFILE:  Recent Labs      12/23/18   0525  12/24/18   0600   AST  19  21   ALT  14  13   LABALBU  3.3*  3.2*       Telemetry: AF with ventricular rates currently 90s to low 100s; had RVR earlier this morning     ASSESSMENT:   1. 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 · Lenient rate control given his acute illness. · Has been anticoagulated with Coumadin; now on hold due to #4  · INR 3.9 on presentation, s/p vitamin K and FFP  · Severely dilated left atrium on echo 12/18/2018  · Up to 4 second pause 12/23, now with episodes of RVR    2. Newly diagnosed LV dysfunction  · EF 35-45% with LV global hypokinesis and diastolic dysfunction on echo 12/18/2018  · Started on isosorbide/hydralazine 12/21/2018. ACE-I/ARB was not started due to acute renal insufficiency  · Toprol XL was reduced yesterday due to 4 second pause     3. Mild to moderate MR and moderate pulmonary hypertension on echo 12/18/2018    4. Rectal bleed: EGD 12/21/2018 showed moderate to severe active gastritis     5. Anemia s/p transfusion: secondary to #4    6. Acute renal insufficiency: resolved     7. COPD    8. MAX    9. History of PE (postoperative) and IVC filter     10. Hypoalbuminemia         PLAN:  1. Monitor BMP: will plan to begin ACE-I/ARB if renal function remains stable. 2. Continue Toprol XL at present dose    3. Resume anticoagulation when stable from surgical standpoint; would prefer to resume within one month     4. Resume oral Lasix    5. Early follow up with Dr. Federica Lennon     6. 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 Echocardiogram results and medication changes reviewed with him. Electronically signed by SOCO Prieto on 12/24/2018 at 10:05 AM     Attestation by Dr. Queen Corral:     I

## 2018-12-26 ENCOUNTER — TELEPHONE (OUTPATIENT)
Dept: NON INVASIVE DIAGNOSTICS | Age: 83
End: 2018-12-26

## 2018-12-26 ENCOUNTER — TELEPHONE (OUTPATIENT)
Dept: CARDIOLOGY CLINIC | Age: 83
End: 2018-12-26

## 2018-12-27 ENCOUNTER — OFFICE VISIT (OUTPATIENT)
Dept: NON INVASIVE DIAGNOSTICS | Age: 83
End: 2018-12-27
Payer: MEDICARE

## 2018-12-27 VITALS
SYSTOLIC BLOOD PRESSURE: 112 MMHG | DIASTOLIC BLOOD PRESSURE: 56 MMHG | WEIGHT: 211 LBS | BODY MASS INDEX: 35.16 KG/M2 | HEART RATE: 91 BPM | RESPIRATION RATE: 22 BRPM | HEIGHT: 65 IN

## 2018-12-27 DIAGNOSIS — I48.21 PERMANENT ATRIAL FIBRILLATION (HCC): Primary | Chronic | ICD-10-CM

## 2018-12-27 PROCEDURE — 99215 OFFICE O/P EST HI 40 MIN: CPT | Performed by: INTERNAL MEDICINE

## 2018-12-27 PROCEDURE — G8427 DOCREV CUR MEDS BY ELIG CLIN: HCPCS | Performed by: INTERNAL MEDICINE

## 2018-12-27 PROCEDURE — G8482 FLU IMMUNIZE ORDER/ADMIN: HCPCS | Performed by: INTERNAL MEDICINE

## 2018-12-27 PROCEDURE — G8417 CALC BMI ABV UP PARAM F/U: HCPCS | Performed by: INTERNAL MEDICINE

## 2018-12-27 PROCEDURE — 1101F PT FALLS ASSESS-DOCD LE1/YR: CPT | Performed by: INTERNAL MEDICINE

## 2018-12-27 PROCEDURE — 93000 ELECTROCARDIOGRAM COMPLETE: CPT | Performed by: INTERNAL MEDICINE

## 2018-12-27 RX ORDER — ALBUTEROL SULFATE 90 UG/1
2 AEROSOL, METERED RESPIRATORY (INHALATION) EVERY 6 HOURS PRN
COMMUNITY
End: 2019-05-15 | Stop reason: ALTCHOICE

## 2018-12-27 NOTE — PROGRESS NOTES
Moderate sized area of equivocal basal lateral ischemia. 27. Repeat lipid panel on Niaspan 1000 mg. Total cholesterol 174, triglycerides 283, HDL 33, LDL 84.  28. Echo, 11/22/2010. LV dilatation. Mild concentric LVH. No regional wall motion abnormalities. EF normal.  Stage I diastolic dysfunction. TDI showed no LA or LVEDP pressure elevation. Moderate LAE. Marked HAJA. Mild RVE. No hemodynamically significant valvular abnormalities. RVSP 55. Aortic root 4.1.    29. Echo, 01/30/2012. Markedly dilated LV. Mild concentric LVH. LV regional wall motion and systolic function normal.   Tissue Doppler indicates normal LV filling pressures. LA moderately dilated, marked HAJA. Mild MR, mild TR.  RVSP 50-55 mmHg. 30. DCCV, 02/07/2012 utilizing AP pads. Patient received a single 100 watt second shock and converted immediately to sinus rhythm. 31. OP evaluation, 09/13/2012. NYHA Class II. EKG shows AF with ventricular rate 64, RBBB and left axis deviation. 32. Head CT, 09/03/2013. Moderate to severe diffuse cerebral atrophy. 35[facility] ER [place], 06/28/2014. CBC normal.  Lytes normal.  BUN 22, Cr 1.3. Pulse 98. /69. Probable mechanical fall. 34. Head CT, 05/26/2014. No acute process. Mild white matter changes. Mild volume loss. 35. Labs, 07/2014. BUN 19, Cr 1.9, GFR 50. Lytes normal.  36. 24-hour ambulatory monitor, 07/2014. AF. Isolated PVCs. Average rate 67. Longest RR cycle 2.2 seconds. 37. [facility] admission, 06/06/2015 for atypical chest pain. Tn negative. [address] MPS, 06/07/2015. No reversible defects to suggest ischemia. EF 67%. 39. Echo, 06/07/2015 (Dr. Anjel Hall). EF normal.  Mild RV, RA and LAE. Moderate MR.  RVSP 63. PHTN moderate. 40. CVA prophylaxis with [address] (Coumadin) 03/2016. 41. Echo 06/07/2015. Normal EF.  LVH. RVSP 63. EF 65%. 42. Hospitalized 08/17/2017 with fatigue, weakness and dyspnea.  hx poor 64 mg by mouth daily      VOLTAREN 1 % GEL Apply topically every morning Per patient, does not measure out medication. Patient places medication on the bottom of both feet once daily.  HYDROcodone-acetaminophen (NORCO) 5-325 MG per tablet Take 1 tablet by mouth every 8 hours as needed for Pain      tolterodine (DETROL LA) 4 MG ER capsule Take 4 mg by mouth every morning       Multiple Vitamins-Minerals (THERAPEUTIC MULTIVITAMIN-MINERALS) tablet Take 1 tablet by mouth every morning LD 7-22-18      warfarin (COUMADIN) 2 MG tablet Take 3 mg by mouth nightly LD 7-22-18      donepezil (ARICEPT) 5 MG tablet Take 5 mg by mouth nightly.  tamsulosin (FLOMAX) 0.4 MG capsule Take 0.4 mg by mouth nightly       finasteride (PROSCAR) 5 MG tablet Take 5 mg by mouth Daily with supper       furosemide (LASIX) 40 MG tablet Take 40 mg by mouth every morning       aspirin 81 MG EC tablet Take 81 mg by mouth nightly To check on hold date       No current facility-administered medications for this visit. Allergies   Allergen Reactions    Morphine      Sees bugs on wall    Percocet [Oxycodone-Acetaminophen]      Sees bugs on wall       ROS:   Constitutional: + Fatigue. HENT: Negative for epistaxis, difficulty swallowing. Eyes: Negative for blurred vision or double vision. Respiratory: Negative for cough, chest tightness, and wheezing. + SOB. Cardiovascular: Negative for chest pain, palpitations and leg swelling. + VALLES. Gastrointestinal: Negative for abdominal pain, heartburn, or blood in stool. Genitourinary: Negative for hematuria, burning or frequency. Musculoskeletal: Negative for myalgias, stiffness, or swelling. Skin: Negative for rash, pain, or lumps. Neurological: Negative for syncope, seizures, or headaches. Psychiatric/Behavioral: Negative for confusion and agitation. The patient is not nervous/anxious.     PHYSICAL EXAM:  Vitals:    12/27/18 1111   BP: (!) 112/56   Pulse: 91   Resp: 22

## 2019-01-01 ENCOUNTER — HOSPITAL ENCOUNTER (OUTPATIENT)
Dept: CARDIOLOGY | Age: 84
Discharge: HOME | End: 2019-08-22
Payer: MEDICARE

## 2019-01-01 ENCOUNTER — OFFICE VISIT (OUTPATIENT)
Dept: CARDIOLOGY CLINIC | Age: 84
End: 2019-01-01
Payer: MEDICARE

## 2019-01-01 ENCOUNTER — OFFICE VISIT (OUTPATIENT)
Dept: FAMILY MEDICINE CLINIC | Age: 84
End: 2019-01-01
Payer: MEDICARE

## 2019-01-01 ENCOUNTER — ANTI-COAG VISIT (OUTPATIENT)
Dept: FAMILY MEDICINE CLINIC | Age: 84
End: 2019-01-01
Payer: MEDICARE

## 2019-01-01 ENCOUNTER — TELEPHONE (OUTPATIENT)
Dept: FAMILY MEDICINE CLINIC | Age: 84
End: 2019-01-01

## 2019-01-01 ENCOUNTER — PROCEDURE VISIT (OUTPATIENT)
Dept: PODIATRY | Age: 84
End: 2019-01-01
Payer: MEDICARE

## 2019-01-01 ENCOUNTER — NURSE ONLY (OUTPATIENT)
Dept: FAMILY MEDICINE CLINIC | Age: 84
End: 2019-01-01
Payer: MEDICARE

## 2019-01-01 ENCOUNTER — HOSPITAL ENCOUNTER (OUTPATIENT)
Age: 84
Discharge: HOME | End: 2019-12-11
Payer: MEDICARE

## 2019-01-01 VITALS
WEIGHT: 218.6 LBS | BODY MASS INDEX: 37.32 KG/M2 | SYSTOLIC BLOOD PRESSURE: 98 MMHG | RESPIRATION RATE: 16 BRPM | HEART RATE: 87 BPM | HEIGHT: 64 IN | DIASTOLIC BLOOD PRESSURE: 60 MMHG

## 2019-01-01 VITALS
SYSTOLIC BLOOD PRESSURE: 122 MMHG | TEMPERATURE: 97.4 F | WEIGHT: 218 LBS | DIASTOLIC BLOOD PRESSURE: 82 MMHG | HEIGHT: 64 IN | BODY MASS INDEX: 37.22 KG/M2

## 2019-01-01 VITALS
DIASTOLIC BLOOD PRESSURE: 62 MMHG | HEART RATE: 75 BPM | BODY MASS INDEX: 37.9 KG/M2 | SYSTOLIC BLOOD PRESSURE: 106 MMHG | WEIGHT: 222 LBS | HEIGHT: 64 IN | TEMPERATURE: 98.7 F | OXYGEN SATURATION: 97 %

## 2019-01-01 VITALS
HEART RATE: 70 BPM | DIASTOLIC BLOOD PRESSURE: 60 MMHG | WEIGHT: 214 LBS | BODY MASS INDEX: 36.54 KG/M2 | HEIGHT: 64 IN | SYSTOLIC BLOOD PRESSURE: 118 MMHG | TEMPERATURE: 97.5 F | OXYGEN SATURATION: 96 %

## 2019-01-01 VITALS
HEART RATE: 79 BPM | TEMPERATURE: 98.6 F | RESPIRATION RATE: 12 BRPM | SYSTOLIC BLOOD PRESSURE: 122 MMHG | DIASTOLIC BLOOD PRESSURE: 58 MMHG | HEIGHT: 64 IN | WEIGHT: 217 LBS | WEIGHT: 217 LBS | BODY MASS INDEX: 37.05 KG/M2 | OXYGEN SATURATION: 96 % | SYSTOLIC BLOOD PRESSURE: 110 MMHG | HEART RATE: 77 BPM | DIASTOLIC BLOOD PRESSURE: 82 MMHG | BODY MASS INDEX: 37.05 KG/M2 | HEIGHT: 64 IN

## 2019-01-01 VITALS
HEIGHT: 64 IN | BODY MASS INDEX: 37.56 KG/M2 | DIASTOLIC BLOOD PRESSURE: 70 MMHG | TEMPERATURE: 98.9 F | HEART RATE: 88 BPM | SYSTOLIC BLOOD PRESSURE: 110 MMHG | OXYGEN SATURATION: 93 % | WEIGHT: 220 LBS

## 2019-01-01 VITALS
DIASTOLIC BLOOD PRESSURE: 64 MMHG | RESPIRATION RATE: 16 BRPM | SYSTOLIC BLOOD PRESSURE: 122 MMHG | BODY MASS INDEX: 37.05 KG/M2 | WEIGHT: 217 LBS | HEART RATE: 81 BPM | HEIGHT: 64 IN

## 2019-01-01 VITALS
HEART RATE: 95 BPM | BODY MASS INDEX: 36.54 KG/M2 | HEIGHT: 64 IN | WEIGHT: 214 LBS | RESPIRATION RATE: 16 BRPM | SYSTOLIC BLOOD PRESSURE: 136 MMHG | DIASTOLIC BLOOD PRESSURE: 78 MMHG

## 2019-01-01 DIAGNOSIS — G89.29 CHRONIC BILATERAL LOW BACK PAIN WITHOUT SCIATICA: ICD-10-CM

## 2019-01-01 DIAGNOSIS — Z79.01 CHRONIC ANTICOAGULATION: ICD-10-CM

## 2019-01-01 DIAGNOSIS — J01.90 ACUTE NON-RECURRENT SINUSITIS, UNSPECIFIED LOCATION: ICD-10-CM

## 2019-01-01 DIAGNOSIS — N40.1 BENIGN PROSTATIC HYPERPLASIA WITH LOWER URINARY TRACT SYMPTOMS, SYMPTOM DETAILS UNSPECIFIED: ICD-10-CM

## 2019-01-01 DIAGNOSIS — F33.0 MILD EPISODE OF RECURRENT MAJOR DEPRESSIVE DISORDER (HCC): ICD-10-CM

## 2019-01-01 DIAGNOSIS — F03.90 DEMENTIA WITHOUT BEHAVIORAL DISTURBANCE, UNSPECIFIED DEMENTIA TYPE: ICD-10-CM

## 2019-01-01 DIAGNOSIS — R53.81 DEBILITY: ICD-10-CM

## 2019-01-01 DIAGNOSIS — R09.02 HYPOXIA: ICD-10-CM

## 2019-01-01 DIAGNOSIS — D50.8 IRON DEFICIENCY ANEMIA SECONDARY TO INADEQUATE DIETARY IRON INTAKE: ICD-10-CM

## 2019-01-01 DIAGNOSIS — E11.9 TYPE 2 DIABETES MELLITUS WITHOUT COMPLICATION, WITHOUT LONG-TERM CURRENT USE OF INSULIN (HCC): ICD-10-CM

## 2019-01-01 DIAGNOSIS — G47.33 OSA (OBSTRUCTIVE SLEEP APNEA): ICD-10-CM

## 2019-01-01 DIAGNOSIS — E11.9 TYPE 2 DIABETES MELLITUS WITHOUT COMPLICATION, WITHOUT LONG-TERM CURRENT USE OF INSULIN (HCC): Primary | ICD-10-CM

## 2019-01-01 DIAGNOSIS — D64.9 ANEMIA, UNSPECIFIED TYPE: ICD-10-CM

## 2019-01-01 DIAGNOSIS — I50.32 CHRONIC DIASTOLIC CHF (CONGESTIVE HEART FAILURE) (HCC): ICD-10-CM

## 2019-01-01 DIAGNOSIS — I73.9 PERIPHERAL VASCULAR DISEASE, UNSPECIFIED (HCC): ICD-10-CM

## 2019-01-01 DIAGNOSIS — I48.20 CHRONIC ATRIAL FIBRILLATION (HCC): Chronic | ICD-10-CM

## 2019-01-01 DIAGNOSIS — M79.675 PAIN IN LEFT TOE(S): ICD-10-CM

## 2019-01-01 DIAGNOSIS — I34.0 MODERATE MITRAL REGURGITATION: ICD-10-CM

## 2019-01-01 DIAGNOSIS — B35.1 TINEA UNGUIUM: Primary | ICD-10-CM

## 2019-01-01 DIAGNOSIS — I48.20 CHRONIC ATRIAL FIBRILLATION (HCC): Primary | Chronic | ICD-10-CM

## 2019-01-01 DIAGNOSIS — K29.61 OTHER GASTRITIS WITH BLEEDING, UNSPECIFIED CHRONICITY: ICD-10-CM

## 2019-01-01 DIAGNOSIS — I48.20 CHRONIC ATRIAL FIBRILLATION (HCC): ICD-10-CM

## 2019-01-01 DIAGNOSIS — M54.50 CHRONIC BILATERAL LOW BACK PAIN WITHOUT SCIATICA: ICD-10-CM

## 2019-01-01 DIAGNOSIS — R06.02 SHORTNESS OF BREATH: ICD-10-CM

## 2019-01-01 DIAGNOSIS — R60.0 LOCALIZED EDEMA: ICD-10-CM

## 2019-01-01 DIAGNOSIS — E11.10 TYPE 2 DIABETES MELLITUS WITH KETOACIDOSIS WITHOUT COMA, WITH LONG-TERM CURRENT USE OF INSULIN (HCC): ICD-10-CM

## 2019-01-01 DIAGNOSIS — E83.42 HYPOMAGNESEMIA: ICD-10-CM

## 2019-01-01 DIAGNOSIS — I71.10 THORACIC AORTIC ANEURYSM, RUPTURED: ICD-10-CM

## 2019-01-01 DIAGNOSIS — I63.9 ACUTE CVA (CEREBROVASCULAR ACCIDENT) (HCC): ICD-10-CM

## 2019-01-01 DIAGNOSIS — N18.30 CKD (CHRONIC KIDNEY DISEASE) STAGE 3, GFR 30-59 ML/MIN (HCC): ICD-10-CM

## 2019-01-01 DIAGNOSIS — M79.674 PAIN IN TOE OF RIGHT FOOT: ICD-10-CM

## 2019-01-01 DIAGNOSIS — E55.9 VITAMIN D DEFICIENCY: ICD-10-CM

## 2019-01-01 DIAGNOSIS — D51.8 OTHER VITAMIN B12 DEFICIENCY ANEMIAS: ICD-10-CM

## 2019-01-01 DIAGNOSIS — H61.23 BILATERAL IMPACTED CERUMEN: ICD-10-CM

## 2019-01-01 DIAGNOSIS — I48.20 CHRONIC ATRIAL FIBRILLATION (HCC): Primary | ICD-10-CM

## 2019-01-01 DIAGNOSIS — Z79.4 TYPE 2 DIABETES MELLITUS WITH KETOACIDOSIS WITHOUT COMA, WITH LONG-TERM CURRENT USE OF INSULIN (HCC): ICD-10-CM

## 2019-01-01 DIAGNOSIS — Z23 NEED FOR IMMUNIZATION AGAINST INFLUENZA: Primary | ICD-10-CM

## 2019-01-01 DIAGNOSIS — I25.10 CORONARY ARTERY DISEASE INVOLVING NATIVE CORONARY ARTERY OF NATIVE HEART WITHOUT ANGINA PECTORIS: ICD-10-CM

## 2019-01-01 DIAGNOSIS — E78.2 MIXED HYPERLIPIDEMIA: Chronic | ICD-10-CM

## 2019-01-01 DIAGNOSIS — E78.2 MIXED HYPERLIPIDEMIA: Primary | Chronic | ICD-10-CM

## 2019-01-01 DIAGNOSIS — I10 ESSENTIAL HYPERTENSION: ICD-10-CM

## 2019-01-01 DIAGNOSIS — R53.83 OTHER FATIGUE: ICD-10-CM

## 2019-01-01 DIAGNOSIS — R26.2 DIFFICULTY WALKING: ICD-10-CM

## 2019-01-01 DIAGNOSIS — J44.9 CHRONIC OBSTRUCTIVE PULMONARY DISEASE, UNSPECIFIED COPD TYPE (HCC): ICD-10-CM

## 2019-01-01 DIAGNOSIS — R05.9 COUGH IN ADULT: Primary | ICD-10-CM

## 2019-01-01 DIAGNOSIS — Z79.01 CHRONIC ANTICOAGULATION: Primary | ICD-10-CM

## 2019-01-01 LAB
ALBUMIN SERPL-MCNC: 3.6 G/DL (ref 3.5–5.2)
ALP BLD-CCNC: 100 U/L (ref 40–129)
ALT SERPL-CCNC: 15 U/L (ref 0–40)
ANION GAP SERPL CALCULATED.3IONS-SCNC: 18 MMOL/L (ref 7–16)
AST SERPL-CCNC: 20 U/L (ref 0–39)
BASOPHILS ABSOLUTE: 0.05 E9/L (ref 0–0.2)
BASOPHILS RELATIVE PERCENT: 0.6 % (ref 0–2)
BILIRUB SERPL-MCNC: 0.6 MG/DL (ref 0–1.2)
BUN BLDV-MCNC: 29 MG/DL (ref 8–23)
CALCIUM SERPL-MCNC: 9.7 MG/DL (ref 8.6–10.2)
CHLORIDE BLD-SCNC: 107 MMOL/L (ref 98–107)
CHOLESTEROL, FASTING: 93 MG/DL (ref 0–199)
CO2: 21 MMOL/L (ref 22–29)
CREAT SERPL-MCNC: 1.3 MG/DL (ref 0.7–1.2)
EOSINOPHILS ABSOLUTE: 1.37 E9/L (ref 0.05–0.5)
EOSINOPHILS RELATIVE PERCENT: 16.9 % (ref 0–6)
FERRITIN: 102 NG/ML
FOLATE: >20 NG/ML (ref 4.8–24.2)
GFR AFRICAN AMERICAN: >60
GFR NON-AFRICAN AMERICAN: 52 ML/MIN/1.73
GLUCOSE BLD-MCNC: 103 MG/DL (ref 74–99)
HBA1C MFR BLD: 6.2 % (ref 4–5.6)
HCT VFR BLD CALC: 42.4 % (ref 37–54)
HDLC SERPL-MCNC: 46 MG/DL
HEMOGLOBIN: 12.8 G/DL (ref 12.5–16.5)
IMMATURE GRANULOCYTES #: 0.05 E9/L
IMMATURE GRANULOCYTES %: 0.6 % (ref 0–5)
INR BLD: 2.1
INR BLD: 2.5
INR BLD: 3
INR BLD: 3.9
INTERNATIONAL NORMALIZATION RATIO, POC: 1.5
INTERNATIONAL NORMALIZATION RATIO, POC: 1.6
INTERNATIONAL NORMALIZATION RATIO, POC: 2.6
IRON SATURATION: 18 % (ref 20–55)
IRON: 58 MCG/DL (ref 59–158)
LDL CHOLESTEROL CALCULATED: 34 MG/DL (ref 0–99)
LYMPHOCYTES ABSOLUTE: 0.79 E9/L (ref 1.5–4)
LYMPHOCYTES RELATIVE PERCENT: 9.8 % (ref 20–42)
MAGNESIUM: 1.9 MG/DL (ref 1.6–2.6)
MCH RBC QN AUTO: 30.8 PG (ref 26–35)
MCHC RBC AUTO-ENTMCNC: 30.2 % (ref 32–34.5)
MCV RBC AUTO: 101.9 FL (ref 80–99.9)
MONOCYTES ABSOLUTE: 0.77 E9/L (ref 0.1–0.95)
MONOCYTES RELATIVE PERCENT: 9.5 % (ref 2–12)
NEUTROPHILS ABSOLUTE: 5.06 E9/L (ref 1.8–7.3)
NEUTROPHILS RELATIVE PERCENT: 62.6 % (ref 43–80)
PDW BLD-RTO: 15.5 FL (ref 11.5–15)
PLATELET # BLD: 225 E9/L (ref 130–450)
PMV BLD AUTO: 10.4 FL (ref 7–12)
POTASSIUM SERPL-SCNC: 4.1 MMOL/L (ref 3.5–5)
PROTHROMBIN TIME, POC: 17.9
PROTHROMBIN TIME, POC: 19.5
PROTHROMBIN TIME, POC: 31.5
RBC # BLD: 4.16 E12/L (ref 3.8–5.8)
SODIUM BLD-SCNC: 146 MMOL/L (ref 132–146)
TOTAL IRON BINDING CAPACITY: 327 MCG/DL (ref 250–450)
TOTAL PROTEIN: 7.1 G/DL (ref 6.4–8.3)
TRIGLYCERIDE, FASTING: 65 MG/DL (ref 0–149)
TSH SERPL DL<=0.05 MIU/L-ACNC: 2.59 UIU/ML (ref 0.27–4.2)
VITAMIN B-12: 909 PG/ML (ref 211–946)
VITAMIN D 25-HYDROXY: 33 NG/ML (ref 30–100)
VLDLC SERPL CALC-MCNC: 13 MG/DL
WBC # BLD: 8.1 E9/L (ref 4.5–11.5)

## 2019-01-01 PROCEDURE — G8427 DOCREV CUR MEDS BY ELIG CLIN: HCPCS | Performed by: INTERNAL MEDICINE

## 2019-01-01 PROCEDURE — 82306 VITAMIN D 25 HYDROXY: CPT

## 2019-01-01 PROCEDURE — 93793 ANTICOAG MGMT PT WARFARIN: CPT | Performed by: INTERNAL MEDICINE

## 2019-01-01 PROCEDURE — 1036F TOBACCO NON-USER: CPT | Performed by: INTERNAL MEDICINE

## 2019-01-01 PROCEDURE — 11721 DEBRIDE NAIL 6 OR MORE: CPT | Performed by: PODIATRIST

## 2019-01-01 PROCEDURE — 99214 OFFICE O/P EST MOD 30 MIN: CPT | Performed by: INTERNAL MEDICINE

## 2019-01-01 PROCEDURE — G8417 CALC BMI ABV UP PARAM F/U: HCPCS | Performed by: INTERNAL MEDICINE

## 2019-01-01 PROCEDURE — 99213 OFFICE O/P EST LOW 20 MIN: CPT | Performed by: INTERNAL MEDICINE

## 2019-01-01 PROCEDURE — G8598 ASA/ANTIPLAT THER USED: HCPCS | Performed by: INTERNAL MEDICINE

## 2019-01-01 PROCEDURE — 83735 ASSAY OF MAGNESIUM: CPT

## 2019-01-01 PROCEDURE — G0008 ADMIN INFLUENZA VIRUS VAC: HCPCS | Performed by: INTERNAL MEDICINE

## 2019-01-01 PROCEDURE — 4040F PNEUMOC VAC/ADMIN/RCVD: CPT | Performed by: INTERNAL MEDICINE

## 2019-01-01 PROCEDURE — 99213 OFFICE O/P EST LOW 20 MIN: CPT | Performed by: FAMILY MEDICINE

## 2019-01-01 PROCEDURE — 36415 COLL VENOUS BLD VENIPUNCTURE: CPT

## 2019-01-01 PROCEDURE — 1111F DSCHRG MED/CURRENT MED MERGE: CPT | Performed by: INTERNAL MEDICINE

## 2019-01-01 PROCEDURE — 80053 COMPREHEN METABOLIC PANEL: CPT

## 2019-01-01 PROCEDURE — 1036F TOBACCO NON-USER: CPT | Performed by: FAMILY MEDICINE

## 2019-01-01 PROCEDURE — 1123F ACP DISCUSS/DSCN MKR DOCD: CPT | Performed by: INTERNAL MEDICINE

## 2019-01-01 PROCEDURE — 83036 HEMOGLOBIN GLYCOSYLATED A1C: CPT

## 2019-01-01 PROCEDURE — 82728 ASSAY OF FERRITIN: CPT

## 2019-01-01 PROCEDURE — 93000 ELECTROCARDIOGRAM COMPLETE: CPT | Performed by: INTERNAL MEDICINE

## 2019-01-01 PROCEDURE — G8482 FLU IMMUNIZE ORDER/ADMIN: HCPCS | Performed by: FAMILY MEDICINE

## 2019-01-01 PROCEDURE — 85610 PROTHROMBIN TIME: CPT | Performed by: INTERNAL MEDICINE

## 2019-01-01 PROCEDURE — G8417 CALC BMI ABV UP PARAM F/U: HCPCS | Performed by: FAMILY MEDICINE

## 2019-01-01 PROCEDURE — 96372 THER/PROPH/DIAG INJ SC/IM: CPT | Performed by: INTERNAL MEDICINE

## 2019-01-01 PROCEDURE — G8427 DOCREV CUR MEDS BY ELIG CLIN: HCPCS | Performed by: FAMILY MEDICINE

## 2019-01-01 PROCEDURE — G8482 FLU IMMUNIZE ORDER/ADMIN: HCPCS | Performed by: INTERNAL MEDICINE

## 2019-01-01 PROCEDURE — 4040F PNEUMOC VAC/ADMIN/RCVD: CPT | Performed by: FAMILY MEDICINE

## 2019-01-01 PROCEDURE — 90653 IIV ADJUVANT VACCINE IM: CPT | Performed by: INTERNAL MEDICINE

## 2019-01-01 PROCEDURE — G8926 SPIRO NO PERF OR DOC: HCPCS | Performed by: INTERNAL MEDICINE

## 2019-01-01 PROCEDURE — 82607 VITAMIN B-12: CPT

## 2019-01-01 PROCEDURE — 80061 LIPID PANEL: CPT

## 2019-01-01 PROCEDURE — 85025 COMPLETE CBC W/AUTO DIFF WBC: CPT

## 2019-01-01 PROCEDURE — 83540 ASSAY OF IRON: CPT

## 2019-01-01 PROCEDURE — 93308 TTE F-UP OR LMTD: CPT

## 2019-01-01 PROCEDURE — 1123F ACP DISCUSS/DSCN MKR DOCD: CPT | Performed by: FAMILY MEDICINE

## 2019-01-01 PROCEDURE — 82746 ASSAY OF FOLIC ACID SERUM: CPT

## 2019-01-01 PROCEDURE — 3023F SPIROM DOC REV: CPT | Performed by: INTERNAL MEDICINE

## 2019-01-01 PROCEDURE — G8598 ASA/ANTIPLAT THER USED: HCPCS | Performed by: FAMILY MEDICINE

## 2019-01-01 PROCEDURE — 84443 ASSAY THYROID STIM HORMONE: CPT

## 2019-01-01 PROCEDURE — 83550 IRON BINDING TEST: CPT

## 2019-01-01 RX ORDER — WARFARIN SODIUM 3 MG/1
1 TABLET ORAL DAILY
COMMUNITY
Start: 2012-11-26 | End: 2019-01-01 | Stop reason: SDUPTHER

## 2019-01-01 RX ORDER — ATORVASTATIN CALCIUM 40 MG/1
40 TABLET, FILM COATED ORAL DAILY
Qty: 30 TABLET | Refills: 5 | Status: SHIPPED | OUTPATIENT
Start: 2019-01-01

## 2019-01-01 RX ORDER — ISOSORBIDE MONONITRATE 60 MG/1
60 TABLET, EXTENDED RELEASE ORAL DAILY
Qty: 90 TABLET | Refills: 3 | Status: SHIPPED | OUTPATIENT
Start: 2019-01-01

## 2019-01-01 RX ORDER — WARFARIN SODIUM 4 MG/1
4 TABLET ORAL DAILY
Qty: 30 TABLET | Refills: 3 | Status: SHIPPED | OUTPATIENT
Start: 2019-01-01 | End: 2019-01-01 | Stop reason: ALTCHOICE

## 2019-01-01 RX ORDER — CEPHALEXIN 500 MG/1
500 CAPSULE ORAL 3 TIMES DAILY
Qty: 30 CAPSULE | Refills: 0 | Status: SHIPPED | OUTPATIENT
Start: 2019-01-01 | End: 2019-01-01

## 2019-01-01 RX ORDER — WARFARIN SODIUM 4 MG/1
1 TABLET ORAL DAILY
COMMUNITY
Start: 2015-01-13 | End: 2019-01-01 | Stop reason: SDUPTHER

## 2019-01-01 RX ORDER — METOPROLOL SUCCINATE 100 MG/1
100 TABLET, EXTENDED RELEASE ORAL DAILY
Qty: 30 TABLET | Refills: 5 | Status: SHIPPED | OUTPATIENT
Start: 2019-01-01 | End: 2020-01-01 | Stop reason: SDUPTHER

## 2019-01-01 RX ORDER — ATORVASTATIN CALCIUM 40 MG/1
40 TABLET, FILM COATED ORAL DAILY
Qty: 30 TABLET | Refills: 5 | Status: SHIPPED | OUTPATIENT
Start: 2019-01-01 | End: 2019-01-01 | Stop reason: SDUPTHER

## 2019-01-01 RX ORDER — DONEPEZIL HYDROCHLORIDE 10 MG/1
5 TABLET, FILM COATED ORAL NIGHTLY
Qty: 30 TABLET | Refills: 5 | Status: SHIPPED
Start: 2019-01-01 | End: 2020-01-01 | Stop reason: SDUPTHER

## 2019-01-01 RX ORDER — CYANOCOBALAMIN 1000 UG/ML
1000 INJECTION INTRAMUSCULAR; SUBCUTANEOUS ONCE
Status: COMPLETED | OUTPATIENT
Start: 2019-01-01 | End: 2019-01-01

## 2019-01-01 RX ORDER — PANTOPRAZOLE SODIUM 40 MG/1
40 TABLET, DELAYED RELEASE ORAL
Qty: 30 TABLET | Refills: 5 | Status: SHIPPED | OUTPATIENT
Start: 2019-01-01

## 2019-01-01 RX ORDER — METOPROLOL SUCCINATE 50 MG/1
75 TABLET, EXTENDED RELEASE ORAL DAILY
Qty: 45 TABLET | Refills: 3 | Status: SHIPPED | OUTPATIENT
Start: 2019-01-01 | End: 2019-01-01 | Stop reason: SDUPTHER

## 2019-01-01 RX ORDER — TOLTERODINE 4 MG/1
4 CAPSULE, EXTENDED RELEASE ORAL EVERY MORNING
Qty: 30 CAPSULE | Refills: 5 | Status: SHIPPED | OUTPATIENT
Start: 2019-01-01

## 2019-01-01 RX ORDER — TOLTERODINE 4 MG/1
4 CAPSULE, EXTENDED RELEASE ORAL EVERY MORNING
Qty: 30 CAPSULE | Refills: 2 | Status: SHIPPED | OUTPATIENT
Start: 2019-01-01 | End: 2019-01-01 | Stop reason: SDUPTHER

## 2019-01-01 RX ORDER — WARFARIN SODIUM 3 MG/1
3 TABLET ORAL NIGHTLY
Qty: 30 TABLET | Refills: 3 | Status: SHIPPED | OUTPATIENT
Start: 2019-01-01 | End: 2019-01-01 | Stop reason: ALTCHOICE

## 2019-01-01 RX ADMIN — CYANOCOBALAMIN 1000 MCG: 1000 INJECTION INTRAMUSCULAR; SUBCUTANEOUS at 14:10

## 2019-01-01 RX ADMIN — CYANOCOBALAMIN 1000 MCG: 1000 INJECTION INTRAMUSCULAR; SUBCUTANEOUS at 15:50

## 2019-01-01 ASSESSMENT — ENCOUNTER SYMPTOMS
EYE REDNESS: 1
CONSTIPATION: 0
EYE PAIN: 0
COUGH: 0
NAUSEA: 0
BLOOD IN STOOL: 0
ABDOMINAL PAIN: 0
EYE REDNESS: 1
CHEST TIGHTNESS: 0
EYES NEGATIVE: 1
SHORTNESS OF BREATH: 1
ABDOMINAL PAIN: 0
CONSTIPATION: 0
RHINORRHEA: 0
EYE PAIN: 0
SINUS PAIN: 0
CONSTIPATION: 0
DIARRHEA: 0
NAUSEA: 0
EYE REDNESS: 1
CHEST TIGHTNESS: 0
SHORTNESS OF BREATH: 1
SORE THROAT: 0
DIARRHEA: 0
SHORTNESS OF BREATH: 1
EYE PAIN: 0
ABDOMINAL PAIN: 0
BLOOD IN STOOL: 0
SHORTNESS OF BREATH: 1
RHINORRHEA: 1
COUGH: 1
SORE THROAT: 0
COUGH: 0
GASTROINTESTINAL NEGATIVE: 1
EYE PAIN: 0
RESPIRATORY NEGATIVE: 1
VOMITING: 0
BACK PAIN: 0
CHEST TIGHTNESS: 0
CONSTIPATION: 0
BLOOD IN STOOL: 0
VOMITING: 0
DIARRHEA: 0
RHINORRHEA: 0
VOMITING: 0
COUGH: 0
NAUSEA: 0
ABDOMINAL PAIN: 0
SORE THROAT: 0
RHINORRHEA: 0
SORE THROAT: 0
VOMITING: 0
NAUSEA: 0
WHEEZING: 0
DIARRHEA: 0
CHEST TIGHTNESS: 0

## 2019-01-03 DIAGNOSIS — I48.21 PERMANENT ATRIAL FIBRILLATION (HCC): Chronic | ICD-10-CM

## 2019-01-04 ENCOUNTER — OFFICE VISIT (OUTPATIENT)
Dept: CARDIOLOGY CLINIC | Age: 84
End: 2019-01-04
Payer: MEDICARE

## 2019-01-04 VITALS
SYSTOLIC BLOOD PRESSURE: 100 MMHG | BODY MASS INDEX: 35.45 KG/M2 | HEART RATE: 118 BPM | RESPIRATION RATE: 16 BRPM | HEIGHT: 65 IN | WEIGHT: 212.8 LBS | DIASTOLIC BLOOD PRESSURE: 70 MMHG

## 2019-01-04 DIAGNOSIS — I48.91 ATRIAL FIBRILLATION, UNSPECIFIED TYPE (HCC): Primary | Chronic | ICD-10-CM

## 2019-01-04 PROCEDURE — 1111F DSCHRG MED/CURRENT MED MERGE: CPT | Performed by: INTERNAL MEDICINE

## 2019-01-04 PROCEDURE — G8482 FLU IMMUNIZE ORDER/ADMIN: HCPCS | Performed by: INTERNAL MEDICINE

## 2019-01-04 PROCEDURE — G8598 ASA/ANTIPLAT THER USED: HCPCS | Performed by: INTERNAL MEDICINE

## 2019-01-04 PROCEDURE — 99214 OFFICE O/P EST MOD 30 MIN: CPT | Performed by: INTERNAL MEDICINE

## 2019-01-04 PROCEDURE — G8427 DOCREV CUR MEDS BY ELIG CLIN: HCPCS | Performed by: INTERNAL MEDICINE

## 2019-01-04 PROCEDURE — 1036F TOBACCO NON-USER: CPT | Performed by: INTERNAL MEDICINE

## 2019-01-04 PROCEDURE — 1123F ACP DISCUSS/DSCN MKR DOCD: CPT | Performed by: INTERNAL MEDICINE

## 2019-01-04 PROCEDURE — G8417 CALC BMI ABV UP PARAM F/U: HCPCS | Performed by: INTERNAL MEDICINE

## 2019-01-04 PROCEDURE — 93000 ELECTROCARDIOGRAM COMPLETE: CPT | Performed by: INTERNAL MEDICINE

## 2019-01-04 PROCEDURE — 1101F PT FALLS ASSESS-DOCD LE1/YR: CPT | Performed by: INTERNAL MEDICINE

## 2019-01-04 PROCEDURE — 4040F PNEUMOC VAC/ADMIN/RCVD: CPT | Performed by: INTERNAL MEDICINE

## 2019-01-07 ENCOUNTER — TELEPHONE (OUTPATIENT)
Dept: NON INVASIVE DIAGNOSTICS | Age: 84
End: 2019-01-07

## 2019-01-07 RX ORDER — FUROSEMIDE 40 MG/1
TABLET ORAL
Qty: 90 TABLET | Refills: 3 | Status: SHIPPED | OUTPATIENT
Start: 2019-01-07 | End: 2019-01-08 | Stop reason: SDUPTHER

## 2019-01-08 ENCOUNTER — NURSE ONLY (OUTPATIENT)
Dept: CARDIOLOGY CLINIC | Age: 84
End: 2019-01-08
Payer: MEDICARE

## 2019-01-08 ENCOUNTER — TELEPHONE (OUTPATIENT)
Dept: CARDIOLOGY CLINIC | Age: 84
End: 2019-01-08

## 2019-01-08 DIAGNOSIS — I48.91 ATRIAL FIBRILLATION, UNSPECIFIED TYPE (HCC): Primary | Chronic | ICD-10-CM

## 2019-01-08 PROCEDURE — 93000 ELECTROCARDIOGRAM COMPLETE: CPT | Performed by: INTERNAL MEDICINE

## 2019-01-08 RX ORDER — FUROSEMIDE 40 MG/1
TABLET ORAL
Qty: 90 TABLET | Refills: 3
Start: 2019-01-08 | End: 2019-06-28

## 2019-02-04 ENCOUNTER — TELEPHONE (OUTPATIENT)
Dept: ADMINISTRATIVE | Age: 84
End: 2019-02-04

## 2019-02-14 ENCOUNTER — OFFICE VISIT (OUTPATIENT)
Dept: CARDIOLOGY CLINIC | Age: 84
End: 2019-02-14
Payer: MEDICARE

## 2019-02-14 ENCOUNTER — HOSPITAL ENCOUNTER (OUTPATIENT)
Age: 84
Discharge: HOME | End: 2019-02-16
Payer: MEDICARE

## 2019-02-14 VITALS
BODY MASS INDEX: 35.22 KG/M2 | DIASTOLIC BLOOD PRESSURE: 62 MMHG | SYSTOLIC BLOOD PRESSURE: 120 MMHG | HEART RATE: 91 BPM | RESPIRATION RATE: 16 BRPM | HEIGHT: 65 IN | WEIGHT: 211.4 LBS

## 2019-02-14 DIAGNOSIS — R06.02 SHORTNESS OF BREATH: ICD-10-CM

## 2019-02-14 DIAGNOSIS — I48.91 ATRIAL FIBRILLATION, UNSPECIFIED TYPE (HCC): Primary | Chronic | ICD-10-CM

## 2019-02-14 DIAGNOSIS — I48.91 ATRIAL FIBRILLATION, UNSPECIFIED TYPE (HCC): Chronic | ICD-10-CM

## 2019-02-14 LAB
ANION GAP SERPL CALCULATED.3IONS-SCNC: 13 MMOL/L (ref 7–16)
BUN BLDV-MCNC: 31 MG/DL (ref 8–23)
CALCIUM SERPL-MCNC: 9.4 MG/DL (ref 8.6–10.2)
CHLORIDE BLD-SCNC: 107 MMOL/L (ref 98–107)
CO2: 25 MMOL/L (ref 22–29)
CREAT SERPL-MCNC: 1.5 MG/DL (ref 0.7–1.2)
GFR AFRICAN AMERICAN: 53
GFR NON-AFRICAN AMERICAN: 44 ML/MIN/1.73
GLUCOSE BLD-MCNC: 107 MG/DL (ref 74–99)
POTASSIUM SERPL-SCNC: 4 MMOL/L (ref 3.5–5)
PRO-BNP: 1150 PG/ML (ref 0–450)
SODIUM BLD-SCNC: 145 MMOL/L (ref 132–146)

## 2019-02-14 PROCEDURE — G8427 DOCREV CUR MEDS BY ELIG CLIN: HCPCS | Performed by: INTERNAL MEDICINE

## 2019-02-14 PROCEDURE — 93000 ELECTROCARDIOGRAM COMPLETE: CPT | Performed by: INTERNAL MEDICINE

## 2019-02-14 PROCEDURE — 1123F ACP DISCUSS/DSCN MKR DOCD: CPT | Performed by: INTERNAL MEDICINE

## 2019-02-14 PROCEDURE — G8417 CALC BMI ABV UP PARAM F/U: HCPCS | Performed by: INTERNAL MEDICINE

## 2019-02-14 PROCEDURE — 4040F PNEUMOC VAC/ADMIN/RCVD: CPT | Performed by: INTERNAL MEDICINE

## 2019-02-14 PROCEDURE — 1036F TOBACCO NON-USER: CPT | Performed by: INTERNAL MEDICINE

## 2019-02-14 PROCEDURE — 99213 OFFICE O/P EST LOW 20 MIN: CPT | Performed by: INTERNAL MEDICINE

## 2019-02-14 PROCEDURE — 1101F PT FALLS ASSESS-DOCD LE1/YR: CPT | Performed by: INTERNAL MEDICINE

## 2019-02-14 PROCEDURE — G8482 FLU IMMUNIZE ORDER/ADMIN: HCPCS | Performed by: INTERNAL MEDICINE

## 2019-02-14 PROCEDURE — 80048 BASIC METABOLIC PNL TOTAL CA: CPT

## 2019-02-14 PROCEDURE — G8598 ASA/ANTIPLAT THER USED: HCPCS | Performed by: INTERNAL MEDICINE

## 2019-02-14 PROCEDURE — 83880 ASSAY OF NATRIURETIC PEPTIDE: CPT

## 2019-02-14 RX ORDER — METOPROLOL SUCCINATE 25 MG/1
37.5 TABLET, EXTENDED RELEASE ORAL DAILY
Qty: 45 TABLET | Refills: 3 | Status: SHIPPED | OUTPATIENT
Start: 2019-02-14 | End: 2019-03-08 | Stop reason: SDUPTHER

## 2019-02-26 ENCOUNTER — TELEPHONE (OUTPATIENT)
Dept: CARDIOLOGY CLINIC | Age: 84
End: 2019-02-26

## 2019-03-08 ENCOUNTER — OFFICE VISIT (OUTPATIENT)
Dept: CARDIOLOGY CLINIC | Age: 84
End: 2019-03-08
Payer: MEDICARE

## 2019-03-08 VITALS
BODY MASS INDEX: 35.16 KG/M2 | WEIGHT: 211 LBS | RESPIRATION RATE: 22 BRPM | HEIGHT: 65 IN | HEART RATE: 92 BPM | DIASTOLIC BLOOD PRESSURE: 78 MMHG | SYSTOLIC BLOOD PRESSURE: 122 MMHG

## 2019-03-08 DIAGNOSIS — I48.91 ATRIAL FIBRILLATION, UNSPECIFIED TYPE (HCC): Primary | Chronic | ICD-10-CM

## 2019-03-08 PROCEDURE — G8482 FLU IMMUNIZE ORDER/ADMIN: HCPCS | Performed by: INTERNAL MEDICINE

## 2019-03-08 PROCEDURE — 1101F PT FALLS ASSESS-DOCD LE1/YR: CPT | Performed by: INTERNAL MEDICINE

## 2019-03-08 PROCEDURE — 93000 ELECTROCARDIOGRAM COMPLETE: CPT | Performed by: INTERNAL MEDICINE

## 2019-03-08 PROCEDURE — 1036F TOBACCO NON-USER: CPT | Performed by: INTERNAL MEDICINE

## 2019-03-08 PROCEDURE — 99213 OFFICE O/P EST LOW 20 MIN: CPT | Performed by: INTERNAL MEDICINE

## 2019-03-08 PROCEDURE — G8598 ASA/ANTIPLAT THER USED: HCPCS | Performed by: INTERNAL MEDICINE

## 2019-03-08 PROCEDURE — 4040F PNEUMOC VAC/ADMIN/RCVD: CPT | Performed by: INTERNAL MEDICINE

## 2019-03-08 PROCEDURE — G8417 CALC BMI ABV UP PARAM F/U: HCPCS | Performed by: INTERNAL MEDICINE

## 2019-03-08 PROCEDURE — 1123F ACP DISCUSS/DSCN MKR DOCD: CPT | Performed by: INTERNAL MEDICINE

## 2019-03-08 PROCEDURE — G8427 DOCREV CUR MEDS BY ELIG CLIN: HCPCS | Performed by: INTERNAL MEDICINE

## 2019-03-09 RX ORDER — METOPROLOL SUCCINATE 50 MG/1
50 TABLET, EXTENDED RELEASE ORAL DAILY
Qty: 30 TABLET | Refills: 5 | Status: SHIPPED | OUTPATIENT
Start: 2019-03-09 | End: 2019-05-15

## 2019-04-17 NOTE — PROGRESS NOTES
fluid.    21. Dobutamine MPS, 05/19/2008. No chest pain or ST changes from baseline RBBB. Gated images normal, EF 73%, no TID. Small to moderate sized area of mild basal lateral ischemia. 22. [place] [facility] [place] 23. [address], [address], 10/17/2008. Normal sinus mechanism without complications achieved. 24. Prostate laser surgery, Dr. Bessy Alvarez, 11/30/2009.  25. Obesity. BMI 33.41 - 08/2015. 26. Adenosine MPS, 06/22/2010 with no chest pain or ST changes. Gated images normal, EF 67%. Borderline TID. Moderate sized area of equivocal basal lateral ischemia. 27. Repeat lipid panel on Niaspan 1000 mg. Total cholesterol 174, triglycerides 283, HDL 33, LDL 84.  28. Echo, 11/22/2010. LV dilatation. Mild concentric LVH. No regional wall motion abnormalities. EF normal.  Stage I diastolic dysfunction. TDI showed no LA or LVEDP pressure elevation. Moderate LAE. Marked HAJA. Mild RVE. No hemodynamically significant valvular abnormalities. RVSP 55. Aortic root 4.1.    29. Echo, 01/30/2012. Markedly dilated LV. Mild concentric LVH. LV regional wall motion and systolic function normal.   Tissue Doppler indicates normal LV filling pressures. LA moderately dilated, marked HAJA. Mild MR, mild TR.  RVSP 50-55 mmHg. 30. DCCV, 02/07/2012 utilizing AP pads. Patient received a single 100 watt second shock and converted immediately to sinus rhythm. 31. OP evaluation, 09/13/2012. NYHA Class II. EKG shows AF with ventricular rate 64, RBBB and left axis deviation. 32. Head CT, 09/03/2013. Moderate to severe diffuse cerebral atrophy. 35[facility] ER evaluation [place], 06/28/2014. CBC normal.  Lytes normal.  BUN 22, Cr 1.3. Pulse 98. /69. Probable mechanical fall. 34. Head CT, 05/26/2014. No acute process. Mild white matter changes. Mild volume loss. 35. Labs, 07/2014. BUN 19, Cr 1.9, GFR 50. Lytes normal.  36. 24-hour ambulatory monitor, 07/2014. negative for seizures and tremors  Endocrine: negative for diabetic symptoms including polydipsia and polyuria  Musculoskeletal: negative for CTD  Psychiatric: negative for psychosis and major depression    On examination, he is a pleasant elderly man in no distress Skin is warm and dry. Respirations are unlabored. There were no vitals taken for this visit. Judithe White HEENT negative for scleral icterus. Extraocular muscles intact. No facial asymmetry or central cyanosis. Neck without masses or goiter. No bruit or JVD. Cardiac apex not displaced. Rhythm regular. Abdomen normal.  Extremities without edema. EKG today shows atrial fibrillation with ventricular response 95/m. RBBB. Possible old inferior MI    It's unclear whether the increase beta blocker has actually affected his dyspnea although the patient is convinced. Therefore the dose of Toprol-XL was decreased today from 50-25 and he will have follow-up in about one month. Pending his status he may benefit by a higher dose of Lasix.     At completion of today's visit, medications include the following:    Current Outpatient Medications:     metoprolol succinate (TOPROL XL) 50 MG extended release tablet, Take 1 tablet by mouth daily, Disp: 30 tablet, Rfl: 5    furosemide (LASIX) 40 MG tablet, Take 40 mg alternating with 80 mg every third day, Disp: 90 tablet, Rfl: 3    albuterol sulfate  (90 Base) MCG/ACT inhaler, Inhale 2 puffs into the lungs every 6 hours as needed for Wheezing, Disp: , Rfl:     isosorbide mononitrate (IMDUR) 30 MG extended release tablet, Take 1 tablet by mouth daily, Disp: 30 tablet, Rfl: 3    pantoprazole (PROTONIX) 40 MG tablet, Take 1 tablet by mouth every morning (before breakfast), Disp: 30 tablet, Rfl: 3    sucralfate (CARAFATE) 1 GM tablet, Take 1 tablet by mouth 4 times daily, Disp: 120 tablet, Rfl: 3    sertraline (ZOLOFT) 25 MG tablet, Take 25 mg by mouth daily Instructed to take am of procedure, Disp: , Rfl:   magnesium chloride (MAG DELAY) 535 (64 Mg) MG TBCR extended release tablet, Take 64 mg by mouth daily, Disp: , Rfl:     VOLTAREN 1 % GEL, Apply topically every morning Per patient, does not measure out medication. Patient places medication on the bottom of both feet once daily. , Disp: , Rfl:     HYDROcodone-acetaminophen (NORCO) 5-325 MG per tablet, Take 1 tablet by mouth every 8 hours as needed for Pain, Disp: , Rfl:     tolterodine (DETROL LA) 4 MG ER capsule, Take 4 mg by mouth every morning , Disp: , Rfl:     Multiple Vitamins-Minerals (THERAPEUTIC MULTIVITAMIN-MINERALS) tablet, Take 1 tablet by mouth every morning LD 7-22-18, Disp: , Rfl:     warfarin (COUMADIN) 2 MG tablet, Take 3 mg by mouth nightly LD 7-22-18, Disp: , Rfl:     donepezil (ARICEPT) 5 MG tablet, Take 5 mg by mouth nightly., Disp: , Rfl:     tamsulosin (FLOMAX) 0.4 MG capsule, Take 0.4 mg by mouth nightly , Disp: , Rfl:     finasteride (PROSCAR) 5 MG tablet, Take 5 mg by mouth Daily with supper , Disp: , Rfl:     aspirin 81 MG EC tablet, Take 81 mg by mouth nightly To check on hold date, Disp: , Rfl:       Dusty Green MD

## 2019-04-18 ENCOUNTER — OFFICE VISIT (OUTPATIENT)
Dept: CARDIOLOGY CLINIC | Age: 84
End: 2019-04-18
Payer: MEDICARE

## 2019-04-18 VITALS
DIASTOLIC BLOOD PRESSURE: 60 MMHG | BODY MASS INDEX: 34.94 KG/M2 | RESPIRATION RATE: 16 BRPM | HEIGHT: 66 IN | HEART RATE: 95 BPM | WEIGHT: 217.4 LBS | SYSTOLIC BLOOD PRESSURE: 102 MMHG

## 2019-04-18 DIAGNOSIS — I48.91 ATRIAL FIBRILLATION, UNSPECIFIED TYPE (HCC): Primary | ICD-10-CM

## 2019-04-18 PROCEDURE — G8427 DOCREV CUR MEDS BY ELIG CLIN: HCPCS | Performed by: INTERNAL MEDICINE

## 2019-04-18 PROCEDURE — 93000 ELECTROCARDIOGRAM COMPLETE: CPT | Performed by: INTERNAL MEDICINE

## 2019-04-18 PROCEDURE — 1036F TOBACCO NON-USER: CPT | Performed by: INTERNAL MEDICINE

## 2019-04-18 PROCEDURE — G8417 CALC BMI ABV UP PARAM F/U: HCPCS | Performed by: INTERNAL MEDICINE

## 2019-04-18 PROCEDURE — G8598 ASA/ANTIPLAT THER USED: HCPCS | Performed by: INTERNAL MEDICINE

## 2019-04-18 PROCEDURE — 4040F PNEUMOC VAC/ADMIN/RCVD: CPT | Performed by: INTERNAL MEDICINE

## 2019-04-18 PROCEDURE — 99213 OFFICE O/P EST LOW 20 MIN: CPT | Performed by: INTERNAL MEDICINE

## 2019-04-18 PROCEDURE — 1123F ACP DISCUSS/DSCN MKR DOCD: CPT | Performed by: INTERNAL MEDICINE

## 2019-04-23 RX ORDER — ISOSORBIDE MONONITRATE 30 MG/1
30 TABLET, EXTENDED RELEASE ORAL DAILY
Qty: 30 TABLET | Refills: 5 | Status: SHIPPED | OUTPATIENT
Start: 2019-04-23 | End: 2019-05-10 | Stop reason: SDUPTHER

## 2019-04-29 VITALS — BODY MASS INDEX: 33.61 KG/M2 | WEIGHT: 209.12 LBS | HEIGHT: 66 IN

## 2019-05-10 ENCOUNTER — OFFICE VISIT (OUTPATIENT)
Dept: CARDIOLOGY CLINIC | Age: 84
End: 2019-05-10
Payer: MEDICARE

## 2019-05-10 VITALS
HEIGHT: 66 IN | RESPIRATION RATE: 14 BRPM | WEIGHT: 212 LBS | DIASTOLIC BLOOD PRESSURE: 82 MMHG | SYSTOLIC BLOOD PRESSURE: 130 MMHG | BODY MASS INDEX: 34.07 KG/M2 | HEART RATE: 87 BPM

## 2019-05-10 DIAGNOSIS — I48.91 ATRIAL FIBRILLATION, UNSPECIFIED TYPE (HCC): Primary | ICD-10-CM

## 2019-05-10 PROCEDURE — 1123F ACP DISCUSS/DSCN MKR DOCD: CPT | Performed by: INTERNAL MEDICINE

## 2019-05-10 PROCEDURE — 4040F PNEUMOC VAC/ADMIN/RCVD: CPT | Performed by: INTERNAL MEDICINE

## 2019-05-10 PROCEDURE — 1036F TOBACCO NON-USER: CPT | Performed by: INTERNAL MEDICINE

## 2019-05-10 PROCEDURE — G8417 CALC BMI ABV UP PARAM F/U: HCPCS | Performed by: INTERNAL MEDICINE

## 2019-05-10 PROCEDURE — G8598 ASA/ANTIPLAT THER USED: HCPCS | Performed by: INTERNAL MEDICINE

## 2019-05-10 PROCEDURE — 99213 OFFICE O/P EST LOW 20 MIN: CPT | Performed by: INTERNAL MEDICINE

## 2019-05-10 PROCEDURE — 93000 ELECTROCARDIOGRAM COMPLETE: CPT | Performed by: INTERNAL MEDICINE

## 2019-05-10 PROCEDURE — G8427 DOCREV CUR MEDS BY ELIG CLIN: HCPCS | Performed by: INTERNAL MEDICINE

## 2019-05-10 RX ORDER — ISOSORBIDE MONONITRATE 60 MG/1
30 TABLET, EXTENDED RELEASE ORAL DAILY
Qty: 30 TABLET | Refills: 5
Start: 2019-05-10 | End: 2019-05-16 | Stop reason: SDUPTHER

## 2019-05-10 NOTE — PROGRESS NOTES
[facility]  [facility]. Vickey Calderon M.D. Renetta Jensen M.D.  Meghna Carrizales. Elder Morales M.D. Feng Simpson M.D. Lala Klinefelter, Neldon Roan, M.D. Fredrick Blanchard   7/16/1929  Miguel Angel Butler MD      This 27-year-old man had outpatient cardiac follow-up today. He has a history of atrial fibrillation and cardiomyopathy. He receives CVA prophylaxis with Coumadin. Ejection fraction in December 2018 was estimated 35-45 percent. He has pulmonary hypertension. He has chronic exertional dyspnea with walking short distances. He notices this from walking in from his car today and sometimes from room to room. He denies orthopnea, PND or ankle edema. Medical History:  1. AF, new onset 03/26/2007. DCCV to sinus mechanism 08/2007 with recurrence by 01/2008. 2. Sleep apnea/COPD/asthma. 3. HTN. 4. Cataracts. 5. Lifelong nonsmoker. 6. Hyperlipidemia. Total cholesterol 101, triglycerides 165, HDL 41, LDL 27 - 11/17/2010.     7. Chronic RBBB, LAFB. 8. Adenosine stress MPS, 03/27/2007. Normal perfusion, EF 60%. 9. Echo, 02/07/2008. EF 60%, no PHTN. LA 3.5, LVH, RVSP 36.    10. BPH.  11. [place] [facility] [place]omy[place], 12/2007. Course complicated by PE (corpectomy T4-L1 with L1-2 fusion graft performed). 12. IVC filter (per patient). 13. [facility] readmission, 02/2008 for CHF. Pleural effusions. 14. Five week rehabilitation at [place] [id number]. Readmitted [facility] for dyspnea. Recurrent PE excluded, 04/2008. 15. Patient denies diabetes mellitus saying hyperglycemia transient post op. 12. Family history positive for MI.  17. Allergy to morphine, Bactrim DS and Percocet. 18. Chronic anticoagulation with Coumadin. 19. Nephrolithiasis, 1998.    20. Echo, 05/15/2008. LVE, no LVH, LVEF normal, KERA, mild-moderate TR with moderate PHTN. RVSP 60 mmHg. Trivial pericardial fluid. 21. Dobutamine MPS, 05/19/2008.   No chest pain or ST changes from baseline RBBB. Gated images normal, EF 73%, no TID. Small to moderate sized area of mild basal lateral ischemia. 22. [place], [facility] [place]2008. 23. [address], [address], 10/17/2008. Normal sinus mechanism without complications achieved. 24. Prostate laser surgery, Dr. Sunshine Hull, 11/30/2009.  25. Obesity. BMI 33.41 - 08/2015. 26. Adenosine MPS, 06/22/2010 with no chest pain or ST changes. Gated images normal, EF 67%. Borderline TID. Moderate sized area of equivocal basal lateral ischemia. 27. Repeat lipid panel on Niaspan 1000 mg. Total cholesterol 174, triglycerides 283, HDL 33, LDL 84.  28. Echo, 11/22/2010. LV dilatation. Mild concentric LVH. No regional wall motion abnormalities. EF normal.  Stage I diastolic dysfunction. TDI showed no LA or LVEDP pressure elevation. Moderate LAE. Marked HAJA. Mild RVE. No hemodynamically significant valvular abnormalities. RVSP 55. Aortic root 4.1.    29. Echo, 01/30/2012. Markedly dilated LV. Mild concentric LVH. LV regional wall motion and systolic function normal.   Tissue Doppler indicates normal LV filling pressures. LA moderately dilated, marked HAJA. Mild MR, mild TR.  RVSP 50-55 mmHg. 30. DCCV, 02/07/2012 utilizing AP pads. Patient received a single 100 watt second shock and converted immediately to sinus rhythm. 31. OP evaluation, 09/13/2012. NYHA Class II. EKG shows AF with ventricular rate 64, RBBB and left axis deviation. 32. Head CT, 09/03/2013. Moderate to severe diffuse cerebral atrophy. 35[facility] ER evaluation [place], 06/28/2014. CBC normal.  Lytes normal.  BUN 22, Cr 1.3. Pulse 98. /69. Probable mechanical fall. 34. Head CT, 05/26/2014. No acute process. Mild white matter changes. Mild volume loss. 35. Labs, 07/2014. BUN 19, Cr 1.9, GFR 50. Lytes normal.  36. 24-hour ambulatory monitor, 07/2014. AF. Isolated PVCs. Average rate 67. Longest RR cycle 2.2 seconds. [facility] admission, 06/06/2015 for atypical chest pain. Tn negative. [address] MPS, 06/07/2015. No reversible defects to suggest ischemia. EF 67%. 39. Echo, 06/07/2015 (Dr. Brenton Schaumann). EF normal.  Mild RV, RA and LAE. Moderate MR.  RVSP 63. PHTN moderate. 40. CVA prophylaxis with [email or web address] (Coumadin) 03/2016. 41. Echo 06/07/2015. Normal EF.  LVH. RVSP 63. EF 65%. 42. Hospitalized 08/17/2017 with fatigue, weakness and dyspnea. hx poor oral intake. Had lactic acidosis and acute kidney injury and an elevated digoxin level (dig level 2.3, PRO-BNP= 655 with BUN= 36 and creatinine= 1.7).  He was volume expanded.  Digoxin was stopped. 43. S/p resection of plantar fascitis muscle, right foot, 07/2018. 44. Echocardiogram, 12/18/2018. Diastolic dysfunction.  Ejection fraction 35-45%.  LV global hypokinesis. LA severely dilated.  Atrial fibrillation.  L atrial pressure elevated.  Moderate mitral regurgitation with central jet.   Mild aortic regurgitation. RVSP 59   No pericardial effusion.   45. Outpatient cardiac evaluation, 01/04/2019. Lasix increased with an extra 40 mg on alternate days  46. Outpatient cardiac evaluation, 02/14/2019. Symptoms improved. Lasix decreased. Toprol-XL increased to 37.5 mg daily, proBNP and BMP checked. 47. Labs, 02/14/2019. Electrolytes normal. BUN 31 and creatinine 1.5. ProBNP 1150.   48. Outpatient cardiac assessment, 03/08/2019. AF. Rate 92. Toprol-XL increased to 50 every morning. 49. Outpatient cardiac follow-up, 04/18/2019. Worsening dyspnea.  Toprol-XL decreased to 25 daily     Review of Systems:  Constitutional: negative for fever and chills  Respiratory: negative for cough and hemoptysis  Cardiovascular:   Gastrointestinal: negative for abdominal pain, diarrhea, nausea and vomiting  Genitourinary:negative for dysuria and hematuria  Derm: negative for rash and skin lesion(s)  Neurological: negative for seizures and tremors  Endocrine: negative for diabetic symptoms including polydipsia and polyuria  Musculoskeletal: negative for CTD  Psychiatric: negative for psychosis and major depression    On examination, he is a pleasant, elderly,  male in no distress. Skin is warm and dry. Respirations are unlabored. /82   Pulse 87   Resp 14   Ht 5' 6\" (1.676 m)   Wt 212 lb (96.2 kg)   BMI 34.22 kg/m² . HEENT negative for scleral icterus. Extraocular muscles intact. No facial asymmetry or central cyanosis. Neck without masses or goiter. No bruit or JVD. Cardiac apex not displaced. Rhythm regular. Abdomen normal.  Extremities without edema. Lung fields are clear, but distant. EKG today shows sinus rhythm 87/m. L RBBB and left axis. There was a prolonged discussion with the patient and his wife. He becomes rather easily dyspneic on a consistent basis. This could be due to his lung or heart disease. He did not wish to try further diuretic. On a trial basis, he will try increasing Imdur from 30-60 mg daily and report back his results.     At completion of today's visit, medications include the following:    Current Outpatient Medications:     isosorbide mononitrate (IMDUR) 60 MG extended release tablet, Take 0.5 tablets by mouth daily (Patient taking differently: Take 30 mg by mouth 2 times daily ), Disp: 30 tablet, Rfl: 5    furosemide (LASIX) 40 MG tablet, Take 40 mg alternating with 80 mg every third day (Patient taking differently: 40 mg daily Take 40 mg alternating with 80 mg every third day), Disp: 90 tablet, Rfl: 3    pantoprazole (PROTONIX) 40 MG tablet, Take 1 tablet by mouth every morning (before breakfast), Disp: 30 tablet, Rfl: 3    sertraline (ZOLOFT) 25 MG tablet, Take 25 mg by mouth daily Instructed to take am of procedure, Disp: , Rfl:     magnesium chloride (MAG DELAY) 535 (64 Mg) MG TBCR extended release tablet, Take 64 mg by mouth daily, Disp: , Rfl:     VOLTAREN 1 % GEL, Apply topically every morning Per patient, does not measure out medication. Patient places medication on the bottom of both feet once daily. , Disp: , Rfl:     tolterodine (DETROL LA) 4 MG ER capsule, Take 4 mg by mouth every morning , Disp: , Rfl:     Multiple Vitamins-Minerals (THERAPEUTIC MULTIVITAMIN-MINERALS) tablet, Take 1 tablet by mouth every morning LD 7-22-18, Disp: , Rfl:     warfarin (COUMADIN) 2 MG tablet, Take 3 mg by mouth nightly Indications: Takes 3 mg, then 4 mg saturday only LD 7-22-18, Disp: , Rfl:     donepezil (ARICEPT) 5 MG tablet, Take 5 mg by mouth nightly., Disp: , Rfl:     tamsulosin (FLOMAX) 0.4 MG capsule, Take 0.4 mg by mouth nightly , Disp: , Rfl:     finasteride (PROSCAR) 5 MG tablet, Take 5 mg by mouth Daily with supper , Disp: , Rfl:     metoprolol succinate (TOPROL XL) 25 MG extended release tablet, Take 25 mg by mouth daily, Disp: , Rfl:     HYDROcodone-acetaminophen (NORCO) 5-325 MG per tablet, Take 1 tablet by mouth 2 times daily as needed for Pain for up to 30 days. , Disp: 60 tablet, Rfl: 0      Dusty Martin MD

## 2019-05-15 ENCOUNTER — OFFICE VISIT (OUTPATIENT)
Dept: FAMILY MEDICINE CLINIC | Age: 84
End: 2019-05-15
Payer: MEDICARE

## 2019-05-15 VITALS
HEIGHT: 66 IN | HEART RATE: 95 BPM | SYSTOLIC BLOOD PRESSURE: 142 MMHG | WEIGHT: 213 LBS | DIASTOLIC BLOOD PRESSURE: 84 MMHG | BODY MASS INDEX: 34.23 KG/M2 | OXYGEN SATURATION: 94 % | TEMPERATURE: 97.3 F

## 2019-05-15 DIAGNOSIS — G89.29 CHRONIC BILATERAL LOW BACK PAIN WITHOUT SCIATICA: ICD-10-CM

## 2019-05-15 DIAGNOSIS — J44.9 CHRONIC OBSTRUCTIVE PULMONARY DISEASE, UNSPECIFIED COPD TYPE (HCC): ICD-10-CM

## 2019-05-15 DIAGNOSIS — N40.1 BENIGN PROSTATIC HYPERPLASIA WITH LOWER URINARY TRACT SYMPTOMS, SYMPTOM DETAILS UNSPECIFIED: ICD-10-CM

## 2019-05-15 DIAGNOSIS — R60.0 LOCALIZED EDEMA: ICD-10-CM

## 2019-05-15 DIAGNOSIS — F33.0 MILD EPISODE OF RECURRENT MAJOR DEPRESSIVE DISORDER (HCC): ICD-10-CM

## 2019-05-15 DIAGNOSIS — N18.30 CKD (CHRONIC KIDNEY DISEASE) STAGE 3, GFR 30-59 ML/MIN (HCC): ICD-10-CM

## 2019-05-15 DIAGNOSIS — I50.32 CHRONIC DIASTOLIC CHF (CONGESTIVE HEART FAILURE) (HCC): ICD-10-CM

## 2019-05-15 DIAGNOSIS — I34.0 MODERATE MITRAL REGURGITATION: ICD-10-CM

## 2019-05-15 DIAGNOSIS — E78.2 MIXED HYPERLIPIDEMIA: Chronic | ICD-10-CM

## 2019-05-15 DIAGNOSIS — I48.20 CHRONIC ATRIAL FIBRILLATION (HCC): Chronic | ICD-10-CM

## 2019-05-15 DIAGNOSIS — G47.33 OSA (OBSTRUCTIVE SLEEP APNEA): ICD-10-CM

## 2019-05-15 DIAGNOSIS — Z79.01 CHRONIC ANTICOAGULATION: Primary | ICD-10-CM

## 2019-05-15 DIAGNOSIS — D50.8 IRON DEFICIENCY ANEMIA SECONDARY TO INADEQUATE DIETARY IRON INTAKE: ICD-10-CM

## 2019-05-15 DIAGNOSIS — F03.90 DEMENTIA WITHOUT BEHAVIORAL DISTURBANCE, UNSPECIFIED DEMENTIA TYPE: ICD-10-CM

## 2019-05-15 DIAGNOSIS — M54.50 CHRONIC BILATERAL LOW BACK PAIN WITHOUT SCIATICA: ICD-10-CM

## 2019-05-15 DIAGNOSIS — H61.23 BILATERAL IMPACTED CERUMEN: ICD-10-CM

## 2019-05-15 DIAGNOSIS — E83.42 HYPOMAGNESEMIA: ICD-10-CM

## 2019-05-15 DIAGNOSIS — D51.8 OTHER VITAMIN B12 DEFICIENCY ANEMIAS: ICD-10-CM

## 2019-05-15 DIAGNOSIS — I10 ESSENTIAL HYPERTENSION: ICD-10-CM

## 2019-05-15 DIAGNOSIS — K29.61 OTHER GASTRITIS WITH BLEEDING, UNSPECIFIED CHRONICITY: ICD-10-CM

## 2019-05-15 DIAGNOSIS — E11.9 TYPE 2 DIABETES MELLITUS WITHOUT COMPLICATION, WITHOUT LONG-TERM CURRENT USE OF INSULIN (HCC): ICD-10-CM

## 2019-05-15 PROBLEM — H61.20 CERUMEN IMPACTION: Status: ACTIVE | Noted: 2019-05-15

## 2019-05-15 PROBLEM — K92.2 GI BLEED: Status: RESOLVED | Noted: 2018-12-20 | Resolved: 2019-05-15

## 2019-05-15 LAB
INTERNATIONAL NORMALIZATION RATIO, POC: 2.7
INTERNATIONAL NORMALIZATION RATIO, POC: 2.7
PROTHROMBIN TIME, POC: 32.2

## 2019-05-15 PROCEDURE — 96372 THER/PROPH/DIAG INJ SC/IM: CPT | Performed by: INTERNAL MEDICINE

## 2019-05-15 PROCEDURE — 85610 PROTHROMBIN TIME: CPT | Performed by: INTERNAL MEDICINE

## 2019-05-15 PROCEDURE — 99214 OFFICE O/P EST MOD 30 MIN: CPT | Performed by: INTERNAL MEDICINE

## 2019-05-15 PROCEDURE — 93793 ANTICOAG MGMT PT WARFARIN: CPT | Performed by: INTERNAL MEDICINE

## 2019-05-15 RX ORDER — HYDROCODONE BITARTRATE AND ACETAMINOPHEN 5; 325 MG/1; MG/1
1 TABLET ORAL 2 TIMES DAILY PRN
Qty: 60 TABLET | Refills: 0 | Status: SHIPPED | OUTPATIENT
Start: 2019-05-15 | End: 2019-06-14 | Stop reason: SDUPTHER

## 2019-05-15 RX ORDER — METOPROLOL SUCCINATE 50 MG/1
50 TABLET, EXTENDED RELEASE ORAL DAILY
COMMUNITY
End: 2019-07-12 | Stop reason: ALTCHOICE

## 2019-05-15 RX ORDER — CYANOCOBALAMIN 1000 UG/ML
1000 INJECTION INTRAMUSCULAR; SUBCUTANEOUS ONCE
Status: COMPLETED | OUTPATIENT
Start: 2019-05-15 | End: 2019-05-15

## 2019-05-15 RX ADMIN — CYANOCOBALAMIN 1000 MCG: 1000 INJECTION INTRAMUSCULAR; SUBCUTANEOUS at 18:04

## 2019-05-15 ASSESSMENT — PATIENT HEALTH QUESTIONNAIRE - PHQ9
SUM OF ALL RESPONSES TO PHQ QUESTIONS 1-9: 0
SUM OF ALL RESPONSES TO PHQ9 QUESTIONS 1 & 2: 0
2. FEELING DOWN, DEPRESSED OR HOPELESS: 0
SUM OF ALL RESPONSES TO PHQ QUESTIONS 1-9: 0
1. LITTLE INTEREST OR PLEASURE IN DOING THINGS: 0

## 2019-05-15 ASSESSMENT — ENCOUNTER SYMPTOMS
NAUSEA: 0
CHEST TIGHTNESS: 0
VOMITING: 0
COUGH: 0
SORE THROAT: 0
ABDOMINAL PAIN: 0
EYE PAIN: 0
DIARRHEA: 0
RHINORRHEA: 0
SHORTNESS OF BREATH: 1
BLOOD IN STOOL: 0
CONSTIPATION: 0

## 2019-05-15 NOTE — PROGRESS NOTES
back. Unchanged. Worse in am. Stable with medications. States right thigh, groin and  lower back. States Norco as needed usually twice a day - helping symptoms. States will also use Voltaren gel. Pain is  described as stabbing. States worse with standing and walking. Denies numbness or tingling to the distal extremity. No  fever or chills. States finished physical therapy (3/2016). Had seen pain management has had epidural in past.    - States concerned about memory. On Aricept. States tolerating medicine. Stable. - Has b12 deficiency. On Vitamin B12 - IM    - States still having sob. Stable. Following with pulmonary. No changes and stable. Stopped inhalers. States stopped  going to pulmonary rehab. No changes.    - States Has diabetes. States not checking blood sugar at home. States no hypoglycemia symptoms. off metformin and  all other DM meds. - States some constipation. States taking Miralax. States improved    Health Maintenance   - immunizations:   Influenza Vaccination - (9/26/2018)  Pneumonia Vaccination - (11/2016) - prevnar, (11/2017) - pneumococcal  Zoster/Shingles Vaccine  Tetanus Vaccination    - Screenings:   Colonoscopy - (2013) Dr. Leonarda Beck on [facility] - (11/13/2017)    Stress Test - (6/2015) - negative    EGD - (12/2018) - active gastritis    ROS:  Review of Systems   Constitutional: Negative for appetite change, chills, fever and unexpected weight change. HENT: Negative for congestion, rhinorrhea and sore throat. Eyes: Negative for pain and visual disturbance. Respiratory: Positive for shortness of breath. Negative for cough and chest tightness. Cardiovascular: Negative for chest pain and palpitations. Gastrointestinal: Negative for abdominal pain, blood in stool, constipation, diarrhea, nausea and vomiting. Genitourinary: Negative for difficulty urinating, dysuria, hematuria, scrotal swelling, testicular pain and urgency.    Musculoskeletal: Negative for arthralgias and gait problem. Skin: Negative for rash. Neurological: Negative for dizziness, syncope, weakness, light-headedness and headaches. Hematological: Negative for adenopathy. Does not bruise/bleed easily. Psychiatric/Behavioral: Negative for suicidal ideas. The patient is not nervous/anxious. Current Outpatient Medications:     metoprolol succinate (TOPROL XL) 25 MG extended release tablet, Take 25 mg by mouth daily, Disp: , Rfl:     HYDROcodone-acetaminophen (NORCO) 5-325 MG per tablet, Take 1 tablet by mouth 2 times daily as needed for Pain for up to 30 days. , Disp: 60 tablet, Rfl: 0    isosorbide mononitrate (IMDUR) 60 MG extended release tablet, Take 0.5 tablets by mouth daily (Patient taking differently: Take 30 mg by mouth 2 times daily ), Disp: 30 tablet, Rfl: 5    furosemide (LASIX) 40 MG tablet, Take 40 mg alternating with 80 mg every third day (Patient taking differently: 40 mg daily Take 40 mg alternating with 80 mg every third day), Disp: 90 tablet, Rfl: 3    pantoprazole (PROTONIX) 40 MG tablet, Take 1 tablet by mouth every morning (before breakfast), Disp: 30 tablet, Rfl: 3    sertraline (ZOLOFT) 25 MG tablet, Take 25 mg by mouth daily Instructed to take am of procedure, Disp: , Rfl:     magnesium chloride (MAG DELAY) 535 (64 Mg) MG TBCR extended release tablet, Take 64 mg by mouth daily, Disp: , Rfl:     VOLTAREN 1 % GEL, Apply topically every morning Per patient, does not measure out medication. Patient places medication on the bottom of both feet once daily. , Disp: , Rfl:     tolterodine (DETROL LA) 4 MG ER capsule, Take 4 mg by mouth every morning , Disp: , Rfl:     Multiple Vitamins-Minerals (THERAPEUTIC MULTIVITAMIN-MINERALS) tablet, Take 1 tablet by mouth every morning LD 7-22-18, Disp: , Rfl:     warfarin (COUMADIN) 2 MG tablet, Take 3 mg by mouth nightly Indications: Takes 3 mg, then 4 mg saturday only LD 7-22-18, Disp: , Rfl:     donepezil (ARICEPT) 5 MG tablet, Take 5 mg by mouth nightly., Disp: , Rfl:     tamsulosin (FLOMAX) 0.4 MG capsule, Take 0.4 mg by mouth nightly , Disp: , Rfl:     finasteride (PROSCAR) 5 MG tablet, Take 5 mg by mouth Daily with supper , Disp: , Rfl:     Allergies   Allergen Reactions    Bactrim [Sulfamethoxazole-Trimethoprim]     Morphine      Sees bugs on wall    Percocet [Oxycodone-Acetaminophen]      Sees bugs on wall       Past Medical History:   Diagnosis Date    Atrial fibrillation [facility])     sees Dr. Yoni Freitas BPH (benign prostatic hyperplasia)     CAD (coronary artery disease)     Chronic anticoagulation     Chronic diastolic CHF (congestive heart failure) (HCC)     CKD (chronic kidney disease) stage 3, GFR 30-59 ml/min (HCC)     COPD (chronic obstructive pulmonary disease) ([facility])     STABLE, SOB WITH EXERTION    Dementia     Hyperlipidemia     Hypertension     Nephrolithiasis     Non-insulin dependent type 2 diabetes mellitus (HCC)     no medications     MAX (obstructive sleep apnea)     USES C PAP    Plantar fasciitis of right foot     for OR 7-27-18     RBBB        Past Surgical History:   Procedure Laterality Date    BACK SURGERY      LUM LAMI, CORPECTOMY, T4-L1, L1-L2    CARDIOVERSION  3/2007, 10/2008, 2/2012    COLONOSCOPY      ECHO COMPL W DOP COLOR FLOW  6/7/2015         EYE SURGERY      CATARACTS    FEMUR SURGERY      right     FOOT SURGERY      FRACTURE SURGERY      right wrist     [place] OFFICE/OUTPT VISIT,PROCEDURE ONLY Right 7/27/2018    RIGHT FOOT PLANTAR FASCIITIS performed by Trish Sumner DPM at [facility].  2009    LASER    UPPER GASTROINTESTINAL ENDOSCOPY N/A 12/21/2018    EGD BIOPSY performed by Skylar Sheets MD at [facility] ENDOSCOPY       Family History   Problem Relation Age of Onset    Cancer Mother 70    Heart Disease Father     Stroke Sister        Social History     Socioeconomic History    Marital status:      Spouse name: Not on file    Number of children: Not on file    Years of education: Not on file    Highest education level: Not on file   Occupational History    Occupation: retired-    Social Needs    Financial resource strain: Not on file    Food insecurity:     Worry: Not on file     Inability: Not on file   [employer] needs:     Medical: Not on file     Non-medical: Not on file   Tobacco Use    Smoking status: Never Smoker    Smokeless tobacco: Never Used   Substance and Sexual Activity    Alcohol use: No     Alcohol/week: 0.0 oz    Drug use: No    Sexual activity: Never   Lifestyle    Physical activity:     Days per week: Not on file     Minutes per session: Not on file    Stress: Not on file   Relationships    Social connections:     Talks on phone: Not on file     Gets together: Not on file     Attends Sabianism service: Not on file     Active member of club or organization: Not on file     Attends meetings of clubs or organizations: Not on file     Relationship status: Not on file    Intimate partner violence:     Fear of current or ex partner: Not on file     Emotionally abused: Not on file     Physically abused: Not on file     Forced sexual activity: Not on file   Other Topics Concern    Not on file   Social History Narrative    Not on file       Vitals:    05/15/19 1644   BP: (!) 142/84   Pulse: 95   Temp: 97.3 °F (36.3 °C)   SpO2: 94%   Weight: 213 lb (96.6 kg)   Height: 5' 6\" (1.676 m)       Exam:  Physical Exam   Constitutional: He is oriented to person, place, and time. He appears well-developed and well-nourished. HENT:   Head: Normocephalic and atraumatic. Right Ear: External ear normal.   Left Ear: External ear normal.   Mouth/Throat: Oropharynx is clear and moist.   Cerumen impaction   Eyes: Pupils are equal, round, and reactive to light. EOM are normal.   Neck: Normal range of motion. Neck supple. No thyromegaly present. Cardiovascular: Normal rate and regular rhythm. Murmur heard.    Systolic murmur is present with a grade of 2/6. Pulmonary/Chest: Effort normal and breath sounds normal. He has no wheezes. He has no rales. Abdominal: Soft. Bowel sounds are normal. There is no tenderness. There is no rebound and no guarding. Musculoskeletal: Normal range of motion. He exhibits edema. Lymphadenopathy:     He has no cervical adenopathy. Neurological: He is alert and oriented to person, place, and time. No cranial nerve deficit. Skin: Skin is warm and dry. Psychiatric: He has a normal mood and affect.  Judgment normal.       Assessment and Plan:    Diagnoses and all orders for this visit:    Chronic anticoagulation  continue present treatment  - follows with cardiology  - current dose 4mg on Saturday and 3mg daily  - INR today (4/12/19) - 2.2  - continue current dose  - no further bleeding  - inr in 1 month    Chronic atrial fibrillation ([facility] [address]  continue present treatment  - follows with cardiology  - current dose 4mg on Saturday and 3mg daily  - INR today (515/19) - 2.7  - continue current dose  - no further bleeding  - inr in 1 month    Type 2 diabetes mellitus without complication, without long-term current use of insulin ([address]  - continue present treatment  - off metformin and other meds - lactic acid increased  - last A1c was 6.0 (3/2019)  - watch diet    Other vitamin B12 deficiency anemias  - continue present treatment  - IM B12 injection today     Mixed hyperlipidemia  - continue present treatment  - off simvastain - muscle pain  - stopped niaspan  - follow labs     Mild episode of recurrent major depressive disorder ([facility] [address]  - continue present treatment  - did not tolerate cymbalta (tremor)  - now on zoloft 50mg  - stable    MAX (obstructive sleep apnea)  - continue present treatment  - wears nightly up to 9 hours a night  - states has helped symptoms and controlled    Essential hypertension  - continue present treatment  - on diltiazem  - On isosorbide increased to 60mg   - on metoprolol decreased to 25mg daily   - off lisinopril per nephro    Chronic diastolic CHF (congestive heart failure) ([facility] [address]  - following with cardiology and EP  - [id number]  - on metoprolol  - on lasix  - off ACE due to kidney    Chronic obstructive pulmonary disease, unspecified COPD type ([facility] [address]  - continue present treatment  - off Symbicort, spiriva and Incruse  - follows with pulmonary  - Completed pulmonary rehab    Other gastritis with bleeding, unspecified chronicity  - uncertain etiology  - EGD (12/2018) - gastritis  - colonoscopy not done  - on protonix - decreased to daily   - off carafate  - follow up with surgery  - h-pylori was positive (placed on clarithromycin, amoxicillin and above)    Chronic bilateral low back pain without sciatica  - continue present treatment  - s/p epidural (2015)  - on norco - states has weaned back and taking daily    [place] report reviewed (5/15/19)  Controlled substance agreement updated (1/11/19)    Patient requests narcotic medication refill. I have reviewed the current medications and prior notes  regarding the need for these refills. I believe the need for refill is warranted at this time. I have  reviewed the [place]AR[place] report and found no suspicion of drug seeking behavior. I have discussed the  side effects and narcotic policy with this patient and the patient has demonstrated understanding. A  follow up appointment will be scheduled with me    Controlled Substances Monitoring:     RX Monitoring 5/15/2019   Attestation The Prescription Monitoring Report for this patient was reviewed today. Chronic Pain Routine Monitoring Possible medication side effects, risk of tolerance/dependence & alternative treatments discussed. ;Obtaining appropriate analgesic effect of treatment. ;No signs of potential drug abuse or diversion identified: otherwise, see note documentation       Benign prostatic hyperplasia with lower urinary tract symptoms, symptom details unspecified  - continue present treatment  - follows with urology  - on finasteride     Dementia without behavioral disturbance, unspecified dementia type  - Continue present treatment   - mini mental status done (2/11/15)  - score 24-26  - on aricept  - stable on reported side effects    CKD (chronic kidney disease) stage 3, GFR 30-59 ml/min ([facility])  - continue present treatment  - no longer following with nephrology  - s/p ultrasound  - may be multifactorial - meds, DM, HTN  - off lisinopril  - on lasix  - Last Lab (4/201) - stable    Iron deficiency anemia secondary to inadequate dietary iron intake  - on iron m/w/f   - follow labs      Localized edema  - continue present treatment  - on lasix  - stable  - on 40mg daily    Hypomagnesemia  - Continue present treatment   - on otc supplement   - follow labs     Cerumen impaction  - Stable today  - no treatment       Return in about 1 month (around 6/15/2019) for check up and review. Orders Placed This Encounter   Procedures    ENROLLMENT FOR ANTICOAGULATION MONITORING     Standing Status:   Standing     Number of Occurrences:   21     Standing Expiration Date:   5/15/2020     Order Specific Question:   INR Goal     Answer:   2.0-3.0 [3]     Order Specific Question:   Responsible Group     Answer:   [facility]  CLINICAL [facility]OL [[id number]]    POCT INR     Standing Status:   Standing     Number of Occurrences:   21     Standing Expiration Date:   5/15/2020    POCT INR     This external order was created through the results console.        Richelle Beltran MD  5/15/2019  9:48 PM

## 2019-05-16 RX ORDER — ISOSORBIDE MONONITRATE 60 MG/1
60 TABLET, EXTENDED RELEASE ORAL DAILY
Qty: 30 TABLET | Refills: 5 | Status: SHIPPED | OUTPATIENT
Start: 2019-05-16 | End: 2019-01-01 | Stop reason: SDUPTHER

## 2019-06-10 ENCOUNTER — OFFICE VISIT (OUTPATIENT)
Dept: FAMILY MEDICINE CLINIC | Age: 84
End: 2019-06-10
Payer: MEDICARE

## 2019-06-10 VITALS
WEIGHT: 217 LBS | BODY MASS INDEX: 35.02 KG/M2 | OXYGEN SATURATION: 95 % | HEART RATE: 87 BPM | DIASTOLIC BLOOD PRESSURE: 80 MMHG | SYSTOLIC BLOOD PRESSURE: 140 MMHG | TEMPERATURE: 97.7 F

## 2019-06-10 DIAGNOSIS — R79.1 SUPRATHERAPEUTIC INR: ICD-10-CM

## 2019-06-10 DIAGNOSIS — H11.31 SUBCONJUNCTIVAL HEMORRHAGE OF RIGHT EYE: Primary | ICD-10-CM

## 2019-06-10 LAB
INTERNATIONAL NORMALIZATION RATIO, POC: 3.1
PROTHROMBIN TIME, POC: 37.4

## 2019-06-10 PROCEDURE — 99214 OFFICE O/P EST MOD 30 MIN: CPT | Performed by: PHYSICIAN ASSISTANT

## 2019-06-10 PROCEDURE — 85610 PROTHROMBIN TIME: CPT | Performed by: PHYSICIAN ASSISTANT

## 2019-06-10 RX ORDER — DONEPEZIL HYDROCHLORIDE 5 MG/1
5 TABLET, FILM COATED ORAL NIGHTLY
COMMUNITY
End: 2019-06-14 | Stop reason: SDUPTHER

## 2019-06-10 NOTE — PROGRESS NOTES
6/10/19  Con Denny : 1929 Sex: male  Age 80 y.o. Subjective:  Chief Complaint   Patient presents with    Eye Problem     right eye         HPI:   Con Denny , 80 y.o. male presents to express care for evaluation of subconjunctival hemorrhage. The patient started noticing this area on Saturday with some soreness to the lateral aspect of the eye. It was more of a bright red color and now has become a darker red color with some swelling of the conjunctivitis to the lateral portion. No visual disturbances. No eye pain. The patient is not having any significant diplopia. The patient is on anticoagulation, warfarin. The patient did not have any traumatic injury. The patient denies any significant stressful event or sneezing/coughing episode. The patient did not vomit. The patient is not having a headache. ROS:   Unless otherwise stated in this report the patient's positive and negative responses for review of systems for constitutional, eyes, ENT, cardiovascular, respiratory, gastrointestinal, neurological, , musculoskeletal, and integument systems and related systems to the presenting problem are either stated in the history of present illness or were not pertinent or were negative for the symptoms and/or complaints related to the presenting medical problem. Positives and pertinent negatives as per HPI. All others reviewed and are negative.       PMH:     Past Medical History:   Diagnosis Date    Atrial fibrillation [facility])     sees Dr. Verba Cranker BPH (benign prostatic hyperplasia)     CAD (coronary artery disease)     Chronic anticoagulation     Chronic diastolic CHF (congestive heart failure) ([facility])     CKD (chronic kidney disease) stage 3, GFR 30-59 ml/min ([facility])     COPD (chronic obstructive pulmonary disease) ([facility])     STABLE, SOB WITH EXERTION    Dementia     Hyperlipidemia     Hypertension     Nephrolithiasis     Non-insulin dependent type 2 diabetes mellitus ([facility] [address] no medications     MAX (obstructive sleep apnea)     USES C PAP    Plantar fasciitis of right foot     for OR 7-27-18     RBBB        Past Surgical History:   Procedure Laterality Date    BACK SURGERY      LUM LAMI, CORPECTOMY, T4-L1, L1-L2    CARDIOVERSION  3/2007, 10/2008, 2/2012    COLONOSCOPY      ECHO COMPL W DOP COLOR FLOW  6/7/2015         EYE SURGERY      CATARACTS    FEMUR SURGERY      right     FOOT SURGERY      FRACTURE SURGERY      right wrist     MD OFFICE/OUTPT VISIT,PROCEDURE ONLY Right 7/27/2018    RIGHT FOOT PLANTAR FASCIITIS performed by Vikas Gregg DPM at [address] [facility].  2009    LASER    UPPER GASTROINTESTINAL ENDOSCOPY N/A 12/21/2018    EGD BIOPSY performed by Suzette Sheikh MD at [facility] ENDOSCOPY     Medications:     Current Outpatient Medications:     isosorbide mononitrate (IMDUR) 60 MG extended release tablet, Take 1 tablet by mouth daily, Disp: 30 tablet, Rfl: 5    metoprolol succinate (TOPROL XL) 25 MG extended release tablet, Take 25 mg by mouth daily, Disp: , Rfl:     HYDROcodone-acetaminophen (NORCO) 5-325 MG per tablet, Take 1 tablet by mouth 2 times daily as needed for Pain for up to 30 days. , Disp: 60 tablet, Rfl: 0    furosemide (LASIX) 40 MG tablet, Take 40 mg alternating with 80 mg every third day (Patient taking differently: 40 mg daily Take 40 mg alternating with 80 mg every third day), Disp: 90 tablet, Rfl: 3    pantoprazole (PROTONIX) 40 MG tablet, Take 1 tablet by mouth every morning (before breakfast), Disp: 30 tablet, Rfl: 3    sertraline (ZOLOFT) 25 MG tablet, Take 25 mg by mouth daily Instructed to take am of procedure, Disp: , Rfl:     magnesium chloride (MAG DELAY) 535 (64 Mg) MG TBCR extended release tablet, Take 64 mg by mouth daily, Disp: , Rfl:     VOLTAREN 1 % GEL, Apply topically every morning Per patient, does not measure out medication. Patient places medication on the bottom of both feet once daily. , Disp: , Rfl:    tolterodine (DETROL LA) 4 MG ER capsule, Take 4 mg by mouth every morning , Disp: , Rfl:     Multiple Vitamins-Minerals (THERAPEUTIC MULTIVITAMIN-MINERALS) tablet, Take 1 tablet by mouth every morning LD 7-22-18, Disp: , Rfl:     warfarin (COUMADIN) 2 MG tablet, Take 3 mg by mouth nightly Indications: Takes 3 mg, then 4 mg saturday only LD 7-22-18, Disp: , Rfl:     tamsulosin (FLOMAX) 0.4 MG capsule, Take 0.4 mg by mouth nightly , Disp: , Rfl:     finasteride (PROSCAR) 5 MG tablet, Take 5 mg by mouth Daily with supper , Disp: , Rfl:     Allergies: Allergies   Allergen Reactions    Bactrim [Sulfamethoxazole-Trimethoprim]     Morphine      Sees bugs on wall    Percocet [Oxycodone-Acetaminophen]      Sees bugs on wall       Social History:     Social History     Tobacco Use    Smoking status: Never Smoker    Smokeless tobacco: Never Used   Substance Use Topics    Alcohol use: No     Alcohol/week: 0.0 oz    Drug use: No       Physical Exam:     Vitals:    06/10/19 1313   BP: (!) 140/80   Pulse: 87   Temp: 97.7 °F (36.5 °C)   SpO2: 95%   Weight: 217 lb (98.4 kg)       Exam:  Physical Exam  Nurses's note reviewed and patient is not hypoxic. General: The patient appears well and in no apparent distress. Patient is resting comfortably on cart. Skin: Warm, dry, no pallor noted. Head: Normocephalic, atraumatic  Neck: Supple, trachea mid-line, no tenderness, no lymphadenopathy  Eye: Normal extraocular motion, pupils were equal round and reactive to light, the patient had no pain with extraocular motion. The patient had tetracaine applied to the right eye, fluorescein dye was instilled following. The patient had exam with woods lamps that showed no evidence of dye uptake but did have some irregularity noted to the lateral perimeter of the 9 o'clock position of the cornea. The patient had no involvement over the pupil. There was evidence of subconjunctival hemorrhage.  The patient's eyelid was everted and

## 2019-06-13 VITALS
SYSTOLIC BLOOD PRESSURE: 126 MMHG | HEIGHT: 66 IN | OXYGEN SATURATION: 96 % | BODY MASS INDEX: 33.75 KG/M2 | HEART RATE: 85 BPM | TEMPERATURE: 97.9 F | WEIGHT: 210 LBS | DIASTOLIC BLOOD PRESSURE: 64 MMHG

## 2019-06-14 ENCOUNTER — OFFICE VISIT (OUTPATIENT)
Dept: FAMILY MEDICINE CLINIC | Age: 84
End: 2019-06-14
Payer: MEDICARE

## 2019-06-14 VITALS
TEMPERATURE: 97.6 F | OXYGEN SATURATION: 98 % | HEART RATE: 71 BPM | HEIGHT: 64 IN | DIASTOLIC BLOOD PRESSURE: 80 MMHG | SYSTOLIC BLOOD PRESSURE: 140 MMHG | WEIGHT: 215.25 LBS | BODY MASS INDEX: 36.75 KG/M2

## 2019-06-14 DIAGNOSIS — F33.0 MILD EPISODE OF RECURRENT MAJOR DEPRESSIVE DISORDER (HCC): ICD-10-CM

## 2019-06-14 DIAGNOSIS — H61.23 BILATERAL IMPACTED CERUMEN: ICD-10-CM

## 2019-06-14 DIAGNOSIS — N40.1 BENIGN PROSTATIC HYPERPLASIA WITH LOWER URINARY TRACT SYMPTOMS, SYMPTOM DETAILS UNSPECIFIED: ICD-10-CM

## 2019-06-14 DIAGNOSIS — J44.9 CHRONIC OBSTRUCTIVE PULMONARY DISEASE, UNSPECIFIED COPD TYPE (HCC): ICD-10-CM

## 2019-06-14 DIAGNOSIS — I50.32 CHRONIC DIASTOLIC CHF (CONGESTIVE HEART FAILURE) (HCC): ICD-10-CM

## 2019-06-14 DIAGNOSIS — Z79.01 CHRONIC ANTICOAGULATION: ICD-10-CM

## 2019-06-14 DIAGNOSIS — E11.9 TYPE 2 DIABETES MELLITUS WITHOUT COMPLICATION, WITHOUT LONG-TERM CURRENT USE OF INSULIN (HCC): ICD-10-CM

## 2019-06-14 DIAGNOSIS — D51.8 OTHER VITAMIN B12 DEFICIENCY ANEMIAS: ICD-10-CM

## 2019-06-14 DIAGNOSIS — M54.50 CHRONIC BILATERAL LOW BACK PAIN WITHOUT SCIATICA: ICD-10-CM

## 2019-06-14 DIAGNOSIS — G89.29 CHRONIC BILATERAL LOW BACK PAIN WITHOUT SCIATICA: ICD-10-CM

## 2019-06-14 DIAGNOSIS — F03.90 DEMENTIA WITHOUT BEHAVIORAL DISTURBANCE, UNSPECIFIED DEMENTIA TYPE: ICD-10-CM

## 2019-06-14 DIAGNOSIS — E78.2 MIXED HYPERLIPIDEMIA: Primary | Chronic | ICD-10-CM

## 2019-06-14 DIAGNOSIS — I10 ESSENTIAL HYPERTENSION: ICD-10-CM

## 2019-06-14 DIAGNOSIS — I25.10 CORONARY ARTERY DISEASE INVOLVING NATIVE CORONARY ARTERY OF NATIVE HEART WITHOUT ANGINA PECTORIS: ICD-10-CM

## 2019-06-14 DIAGNOSIS — G47.33 OSA (OBSTRUCTIVE SLEEP APNEA): ICD-10-CM

## 2019-06-14 DIAGNOSIS — E83.42 HYPOMAGNESEMIA: ICD-10-CM

## 2019-06-14 DIAGNOSIS — N18.30 CKD (CHRONIC KIDNEY DISEASE) STAGE 3, GFR 30-59 ML/MIN (HCC): ICD-10-CM

## 2019-06-14 DIAGNOSIS — D50.8 IRON DEFICIENCY ANEMIA SECONDARY TO INADEQUATE DIETARY IRON INTAKE: ICD-10-CM

## 2019-06-14 DIAGNOSIS — I34.0 MODERATE MITRAL REGURGITATION: ICD-10-CM

## 2019-06-14 DIAGNOSIS — K29.61 OTHER GASTRITIS WITH BLEEDING, UNSPECIFIED CHRONICITY: ICD-10-CM

## 2019-06-14 DIAGNOSIS — I48.20 CHRONIC ATRIAL FIBRILLATION (HCC): ICD-10-CM

## 2019-06-14 DIAGNOSIS — R60.0 LOCALIZED EDEMA: ICD-10-CM

## 2019-06-14 LAB
INTERNATIONAL NORMALIZATION RATIO, POC: 2.3
PROTHROMBIN TIME, POC: 28

## 2019-06-14 PROCEDURE — 85610 PROTHROMBIN TIME: CPT | Performed by: INTERNAL MEDICINE

## 2019-06-14 PROCEDURE — 69210 REMOVE IMPACTED EAR WAX UNI: CPT | Performed by: INTERNAL MEDICINE

## 2019-06-14 PROCEDURE — 99214 OFFICE O/P EST MOD 30 MIN: CPT | Performed by: INTERNAL MEDICINE

## 2019-06-14 PROCEDURE — 96372 THER/PROPH/DIAG INJ SC/IM: CPT | Performed by: INTERNAL MEDICINE

## 2019-06-14 RX ORDER — CYANOCOBALAMIN 1000 UG/ML
1000 INJECTION INTRAMUSCULAR; SUBCUTANEOUS ONCE
Status: COMPLETED | OUTPATIENT
Start: 2019-06-14 | End: 2019-06-14

## 2019-06-14 RX ORDER — DONEPEZIL HYDROCHLORIDE 5 MG/1
5 TABLET, FILM COATED ORAL NIGHTLY
Qty: 30 TABLET | Refills: 5 | Status: SHIPPED | OUTPATIENT
Start: 2019-06-14 | End: 2019-07-12 | Stop reason: SDUPTHER

## 2019-06-14 RX ORDER — HYDROCODONE BITARTRATE AND ACETAMINOPHEN 5; 325 MG/1; MG/1
1 TABLET ORAL 2 TIMES DAILY PRN
Qty: 60 TABLET | Refills: 0 | Status: SHIPPED | OUTPATIENT
Start: 2019-06-14 | End: 2019-06-28

## 2019-06-14 RX ADMIN — CYANOCOBALAMIN 1000 MCG: 1000 INJECTION INTRAMUSCULAR; SUBCUTANEOUS at 16:32

## 2019-06-14 ASSESSMENT — ENCOUNTER SYMPTOMS
SORE THROAT: 0
ABDOMINAL PAIN: 0
SHORTNESS OF BREATH: 1
EYE REDNESS: 1
RHINORRHEA: 0
CHEST TIGHTNESS: 0
DIARRHEA: 0
VOMITING: 0
COUGH: 0
BLOOD IN STOOL: 0
NAUSEA: 0
EYE PAIN: 0
CONSTIPATION: 0

## 2019-06-14 NOTE — PROGRESS NOTES
[facility]) Physicians   Internal Medicine     2019  Halle Miller : 1929 Sex: male  Age:89 y.o. Chief Complaint   Patient presents with    Office Visit for Anticoagulation Management    Atrial Fibrillation        HPI:   Patient presents to office for review and management of chronic medical conditions. States had GI bleeding. Resolved.  was seen by surgery and had EGD - showed gastritis. Biopsy showed H  pylori. States just on pantoprazole currently. - States has atrial fibrillation. States follows with cardiology. Hospital admit for CHF in 2017,2018. Stable currently,  last follow up 2019. On metoprolol dereased to 25mg. Added imdur 60mg. Also had sinus pauses.  was seen by EP as outpatient and given 24 hour monitor. Follow up in 3 months     - States on long term anticoagulation. States taking coumadin. Currently on 4mg Saturday and 3mg daily. No current  bleeding.  was seen in urgent care for conjunctival hemorrhage. INR was 3 ans ode was held. Current INR today was 2.3    - States has urinary frequency. - States has chronic recurrent cerumen impaction. Stable. - States having depression. Had increased fatigue and lack of motivation. States not able to do things like before. On  Zoloft. No reported side effects.    - States Chronic kidney disease, no longer following with nephrology. States off lisinopril. Had US kidney. On Lasix  40mg daily    - States has seen dermatology. States has some lesions removed from right arm. Previously had lesions removed from  back of neck near left ear and right arm.  was told cancer. No reports. Last visit (2018) - lesion removed from  the left ear. - States still with b/l ankle pain. Stable. States worse with walking. States can ambulate but using cane. States very  weak. States has seen podiatry and had injections which seem to have helped. Recently referred to physical therapy - did  not help.  Still with heel spur, s/p plantar fascial release    - Patient reports pain to the lower back. Unchanged. Worse in am. Stable with medications. States right thigh, groin and  lower back. States Norco as needed usually twice a day - helping symptoms. States will also use Voltaren gel. Pain is  described as stabbing. States worse with standing and walking. Denies numbness or tingling to the distal extremity. No  fever or chills. States finished physical therapy (3/2016). Had seen pain management has had epidural in past.    - States concerned about memory. On Aricept. States tolerating medicine. Stable. - Has b12 deficiency. On Vitamin B12 - IM    - States still having sob. Stable. Following with pulmonary. No changes and stable. Stopped inhalers. States stopped  going to pulmonary rehab. No changes.    - States Has diabetes. States not checking blood sugar at home. States no hypoglycemia symptoms. off metformin and  all other DM meds. - States some constipation. States taking Miralax. States improved    Health Maintenance   - immunizations:   Influenza Vaccination - (9/26/2018)  Pneumonia Vaccination - (11/2016) - prevnar, (11/2017) - pneumococcal  Zoster/Shingles Vaccine  Tetanus Vaccination    - Screenings:   Colonoscopy - (2013) Dr. Kaylie Townsend on [facility] - (11/13/2017)    Stress Test - (6/2015) - negative    EGD - (12/2018) - active gastritis    ROS:  Review of Systems   Constitutional: Negative for appetite change, chills, fever and unexpected weight change. HENT: Negative for congestion, rhinorrhea and sore throat. Eyes: Positive for redness. Negative for pain and visual disturbance. Respiratory: Positive for shortness of breath. Negative for cough and chest tightness. Cardiovascular: Negative for chest pain and palpitations. Gastrointestinal: Negative for abdominal pain, blood in stool, constipation, diarrhea, nausea and vomiting.    Genitourinary: Negative for difficulty 3 mg, then 4 mg saturday only LD 7-22-18, Disp: , Rfl:     tamsulosin (FLOMAX) 0.4 MG capsule, Take 0.4 mg by mouth nightly , Disp: , Rfl:     finasteride (PROSCAR) 5 MG tablet, Take 5 mg by mouth Daily with supper , Disp: , Rfl:     Allergies   Allergen Reactions    Bactrim [Sulfamethoxazole-Trimethoprim]     Morphine      Sees bugs on wall    Percocet [Oxycodone-Acetaminophen]      Sees bugs on wall       Past Medical History:   Diagnosis Date    Atrial fibrillation [facility])     sees Dr. Milla Sinclair BPH (benign prostatic hyperplasia)     CAD (coronary artery disease)     Chronic anticoagulation     Chronic diastolic CHF (congestive heart failure) ([facility]     CKD (chronic kidney disease) stage 3, GFR 30-59 ml/min ([facility])     COPD (chronic obstructive pulmonary disease) ([facility])     STABLE, SOB WITH EXERTION    Dementia     Hyperlipidemia     Hypertension     Nephrolithiasis     Non-insulin dependent type 2 diabetes mellitus ([facility])     no medications     MAX (obstructive sleep apnea)     USES C PAP    Plantar fasciitis of right foot     for OR 7-27-18     RBBB        Past Surgical History:   Procedure Laterality Date    BACK SURGERY      LUM LAMI, CORPECTOMY, T4-L1, L1-L2    CARDIOVERSION  3/2007, 10/2008, 2/2012    COLONOSCOPY      ECHO COMPL W DOP COLOR FLOW  6/7/2015         EYE SURGERY      CATARACTS    FEMUR SURGERY      right     FOOT SURGERY      FRACTURE SURGERY      right wrist     [facility] OFFICE/OUTPT VISIT,PROCEDURE ONLY Right 7/27/2018    RIGHT FOOT PLANTAR FASCIITIS performed by Vasiliy Moffett DPM at [facility].  2009    LASER    UPPER GASTROINTESTINAL ENDOSCOPY N/A 12/21/2018    EGD BIOPSY performed by Mickey Garza MD at [facility] ENDOSCOPY       Family History   Problem Relation Age of Onset    Cancer Mother 70    Heart Disease Father     Stroke Sister        Social History     Socioeconomic History    Marital status:      Spouse name: Not on file    Number of children: Not on file    Years of education: Not on file    Highest education level: Not on file   Occupational History    Occupation: retired-    Social Needs    Financial resource strain: Not on file    Food insecurity:     Worry: Not on file     Inability: Not on file   "[employer] needs:     Medical: Not on file     Non-medical: Not on file   Tobacco Use    Smoking status: Never Smoker    Smokeless tobacco: Never Used   Substance and Sexual Activity    Alcohol use: No     Alcohol/week: 0.0 oz    Drug use: No    Sexual activity: Never   Lifestyle    Physical activity:     Days per week: Not on file     Minutes per session: Not on file    Stress: Not on file   Relationships    Social connections:     Talks on phone: Not on file     Gets together: Not on file     Attends Mu-ism service: Not on file     Active member of club or organization: Not on file     Attends meetings of clubs or organizations: Not on file     Relationship status: Not on file    Intimate partner violence:     Fear of current or ex partner: Not on file     Emotionally abused: Not on file     Physically abused: Not on file     Forced sexual activity: Not on file   Other Topics Concern    Not on file   Social History Narrative    Not on file       Vitals:    06/14/19 1549 06/14/19 1635   BP: (!) 180/80 (!) 140/80   Pulse: 71    Temp: 97.6 °F (36.4 °C)    SpO2: 98%    Weight: 215 lb 4 oz (97.6 kg)    Height: 5' 4\" (1.626 m)        Exam:  Physical Exam   Constitutional: He is oriented to person, place, and time. He appears well-developed and well-nourished. HENT:   Head: Normocephalic and atraumatic. Right Ear: External ear normal.   Left Ear: External ear normal.   Mouth/Throat: Oropharynx is clear and moist.   Cerumen impaction b/l, right > left    Eyes: Pupils are equal, round, and reactive to light. EOM are normal.   Neck: Normal range of motion. Neck supple. No thyromegaly present.    Cardiovascular: Normal rate and regular rhythm. Murmur heard. Systolic murmur is present with a grade of 2/6. Pulmonary/Chest: Effort normal and breath sounds normal. He has no wheezes. He has no rales. Abdominal: Soft. Bowel sounds are normal. There is no tenderness. There is no rebound and no guarding. Musculoskeletal: Normal range of motion. He exhibits edema. Lymphadenopathy:     He has no cervical adenopathy. Neurological: He is alert and oriented to person, place, and time. No cranial nerve deficit. Skin: Skin is warm and dry. Psychiatric: He has a normal mood and affect.  Judgment normal.       Assessment and Plan:    Diagnoses and all orders for this visit:    Chronic anticoagulation  continue present treatment  - follows with cardiology  - current dose 4mg on Saturday and 3mg daily  - INR today (6/14/19) - 2.3, skipped one dose due to increase at 3.1 for conjunctival hemorrhage   - decrease to 3mg daily   - no further bleeding  - inr in 2 weeks     Chronic atrial fibrillation ([facility]  continue present treatment  - follows with cardiology  - current dose 4mg on Saturday and 3mg daily  - INR today (6/14/19) - 2.7  - continue current dose  - no further bleeding  - inr in 2 weeks     Type 2 diabetes mellitus without complication, without long-term current use of insulin (HCC)  - continue present treatment  - off metformin and other meds - lactic acid increased  - last A1c was 6.0 (3/2019)  - watch diet    Other vitamin B12 deficiency anemias  - continue present treatment  - IM B12 injection today     Mixed hyperlipidemia  - continue present treatment  - off simvastain - muscle pain  - stopped niaspan  - follow labs     Mild episode of recurrent major depressive disorder ([facility]  - continue present treatment  - did not tolerate cymbalta (tremor)  - now on zoloft 50mg  - stable    MAX (obstructive sleep apnea)  - continue present treatment  - wears nightly up to 9 hours a night  - states has helped symptoms and controlled    Essential hypertension  - continue present treatment  - on diltiazem  - On isosorbide increased to 60mg   - on metoprolol decreased to 25mg daily   - off lisinopril per nephro    Chronic diastolic CHF (congestive heart failure) ([facility]  - following with cardiology and EP  - [id number]%  - on metoprolol  - on lasix  - off ACE due to kidney    Chronic obstructive pulmonary disease, unspecified COPD type ([facility]  - continue present treatment  - off Symbicort, spiriva and Incruse  - follows with pulmonary  - Completed pulmonary rehab    Other gastritis with bleeding, unspecified chronicity  - uncertain etiology  - EGD (12/2018) - gastritis  - colonoscopy not done  - on protonix - decreased to daily   - off carafate  - follow up with surgery  - h-pylori was positive (placed on clarithromycin, amoxicillin and above)    Chronic bilateral low back pain without sciatica  - continue present treatment  - s/p epidural (2015)  - on norco - states has weaned back and taking daily    OARRS report reviewed (6/14/19)  Controlled substance agreement updated (1/11/19)    Patient requests narcotic medication refill. I have reviewed the current medications and prior notes  regarding the need for these refills. I believe the need for refill is warranted at this time. I have  reviewed the OARRS report and found no suspicion of drug seeking behavior. I have discussed the  side effects and narcotic policy with this patient and the patient has demonstrated understanding. A  follow up appointment will be scheduled with me    Controlled Substance Monitoring:    Acute and Chronic Pain Monitoring:   RX Monitoring 6/14/2019   Attestation -   Periodic Controlled Substance Monitoring Possible medication side effects, risk of tolerance/dependence & alternative treatments discussed. ;No signs of potential drug abuse or diversion identified.;Obtaining appropriate analgesic effect of treatment.         Benign prostatic hyperplasia with lower urinary tract symptoms, symptom details unspecified  - continue present treatment  - follows with urology  - on finasteride     Dementia without behavioral disturbance, unspecified dementia type  - Continue present treatment   - mini mental status done (2/11/15)  - score 24-26  - on aricept  - stable on reported side effects    CKD (chronic kidney disease) stage 3, GFR 30-59 ml/min ([facility])  - continue present treatment  - no longer following with nephrology  - s/p ultrasound  - may be multifactorial - meds, DM, HTN  - off lisinopril  - on lasix  - Last Lab (4/2019) - stable    Iron deficiency anemia secondary to inadequate dietary iron intake  - on iron m/w/f   - follow labs      Localized edema  - continue present treatment  - on lasix  - stable  - on 40mg daily    Hypomagnesemia  - Continue present treatment   - on otc supplement   - follow labs     Cerumen impaction  - continue present treatment   - removed cerumen b/l via curette - no trauma noted, most cerumen removed        Return in about 1 month (around 7/14/2019) for check up and review.       Orders Placed This Encounter   Procedures    MI REMOVAL IMPACTED CERUMEN INSTRUMENTATION UNILAT       Caitlin Rivera MD  6/14/2019  5:07 PM

## 2019-06-24 ENCOUNTER — PROCEDURE VISIT (OUTPATIENT)
Dept: PODIATRY | Age: 84
End: 2019-06-24
Payer: MEDICARE

## 2019-06-24 VITALS
TEMPERATURE: 97.8 F | SYSTOLIC BLOOD PRESSURE: 128 MMHG | WEIGHT: 217 LBS | DIASTOLIC BLOOD PRESSURE: 88 MMHG | BODY MASS INDEX: 37.05 KG/M2 | HEIGHT: 64 IN

## 2019-06-24 DIAGNOSIS — M77.51 BURSITIS OF BOTH FEET: ICD-10-CM

## 2019-06-24 DIAGNOSIS — M79.674 PAIN IN TOE OF RIGHT FOOT: ICD-10-CM

## 2019-06-24 DIAGNOSIS — M77.52 BURSITIS OF BOTH FEET: ICD-10-CM

## 2019-06-24 DIAGNOSIS — E11.10 TYPE 2 DIABETES MELLITUS WITH KETOACIDOSIS WITHOUT COMA, WITH LONG-TERM CURRENT USE OF INSULIN (HCC): Primary | ICD-10-CM

## 2019-06-24 DIAGNOSIS — B35.1 TINEA UNGUIUM: ICD-10-CM

## 2019-06-24 DIAGNOSIS — R26.2 DIFFICULTY WALKING: ICD-10-CM

## 2019-06-24 DIAGNOSIS — Z79.4 TYPE 2 DIABETES MELLITUS WITH KETOACIDOSIS WITHOUT COMA, WITH LONG-TERM CURRENT USE OF INSULIN (HCC): Primary | ICD-10-CM

## 2019-06-24 DIAGNOSIS — M79.675 PAIN IN LEFT TOE(S): ICD-10-CM

## 2019-06-24 DIAGNOSIS — I73.9 PERIPHERAL VASCULAR DISEASE, UNSPECIFIED (HCC): ICD-10-CM

## 2019-06-24 PROCEDURE — 20550 NJX 1 TENDON SHEATH/LIGAMENT: CPT | Performed by: PODIATRIST

## 2019-06-24 PROCEDURE — 11721 DEBRIDE NAIL 6 OR MORE: CPT | Performed by: PODIATRIST

## 2019-06-24 RX ORDER — BUPIVACAINE HYDROCHLORIDE 5 MG/ML
1 INJECTION, SOLUTION PERINEURAL ONCE
Status: COMPLETED | OUTPATIENT
Start: 2019-06-24 | End: 2019-06-24

## 2019-06-24 RX ORDER — DEXAMETHASONE SODIUM PHOSPHATE 4 MG/ML
4 INJECTION, SOLUTION INTRA-ARTICULAR; INTRALESIONAL; INTRAMUSCULAR; INTRAVENOUS; SOFT TISSUE ONCE
Status: COMPLETED | OUTPATIENT
Start: 2019-06-24 | End: 2019-06-24

## 2019-06-24 RX ADMIN — BUPIVACAINE HYDROCHLORIDE 5 MG: 5 INJECTION, SOLUTION PERINEURAL at 12:12

## 2019-06-24 RX ADMIN — DEXAMETHASONE SODIUM PHOSPHATE 4 MG: 4 INJECTION, SOLUTION INTRA-ARTICULAR; INTRALESIONAL; INTRAMUSCULAR; INTRAVENOUS; SOFT TISSUE at 12:13

## 2019-06-24 ASSESSMENT — ENCOUNTER SYMPTOMS
GASTROINTESTINAL NEGATIVE: 1
RESPIRATORY NEGATIVE: 1
EYES NEGATIVE: 1

## 2019-06-24 NOTE — PROGRESS NOTES
[address] [place] [address]  Dept: [phone]  Dept Fax: [phone]    DIABETIC NAIL PROGRESS NOTE  Date of patient's visit: 6/24/2019  Patient's Name:  Kat Miller YOB: 1929            Patient Care Team:  Katelin Claudio MD as PCP - General (Internal Medicine)  Katelin Claudio MD as PCP - [facility]  Vielka Murrell MD as Consulting Physician (Pulmonology)  Janki Mcrae MD as Surgeon (Urology)  Jaime Pagan DO as Consulting Physician (Dermatology)  Tripp Main MD as Consulting Physician (Cardiology)  Jet Hunter DO as Consulting Physician (Electrophysiology)          Chief Complaint   Patient presents with    Diabetes     dm nail care saw PCP Dr. Weston Campbell on 6/14/2019       Subjective:   Kat Miller comes to clinic for Diabetes (dm nail care saw PCP Dr. Weston Campbell on 6/14/2019)    he is a diabetic and states that patient is seen for his diabetic foot exam as well as nail pain and bilateral heel pain pain is more in the mornings when he first gets up he does very well with cortisone shot every 3 to 4 months. Pt currently has complaint of thickened, elongated nails that they cannot manage by themselves. Pt's primary care physician is Katelin Claudio MD last seen   . Lab Results   Component Value Date    LABA1C 5.8 09/26/2018      Complains of numbness in the feet bilat.   Past Medical History:   Diagnosis Date    Atrial fibrillation [facility])     sees Dr. Osbaldo Villaseñor BPH (benign prostatic hyperplasia)     CAD (coronary artery disease)     Chronic anticoagulation     Chronic diastolic CHF (congestive heart failure) ([facility])     CKD (chronic kidney disease) stage 3, GFR 30-59 ml/min ([facility])     COPD (chronic obstructive pulmonary disease) ([facility])     STABLE, SOB WITH EXERTION    Dementia     Hyperlipidemia     Hypertension     Nephrolithiasis     Non-insulin dependent type 2 diabetes mellitus (HCC)     no medications     MAX (obstructive sleep apnea)     USES C PAP    Plantar fasciitis of right foot     for OR 7-27-18     RBBB        Allergies   Allergen Reactions    Bactrim [Sulfamethoxazole-Trimethoprim]     Morphine      Sees bugs on wall    Percocet [Oxycodone-Acetaminophen]      Sees bugs on wall     Current Outpatient Medications on File Prior to Visit   Medication Sig Dispense Refill    donepezil (ARICEPT) 5 MG tablet Take 1 tablet by mouth nightly 30 tablet 5    HYDROcodone-acetaminophen (NORCO) 5-325 MG per tablet Take 1 tablet by mouth 2 times daily as needed for Pain for up to 30 days. 60 tablet 0    isosorbide mononitrate (IMDUR) 60 MG extended release tablet Take 1 tablet by mouth daily 30 tablet 5    metoprolol succinate (TOPROL XL) 25 MG extended release tablet Take 25 mg by mouth daily      furosemide (LASIX) 40 MG tablet Take 40 mg alternating with 80 mg every third day (Patient taking differently: 40 mg daily ) 90 tablet 3    pantoprazole (PROTONIX) 40 MG tablet Take 1 tablet by mouth every morning (before breakfast) 30 tablet 3    sertraline (ZOLOFT) 25 MG tablet Take 25 mg by mouth daily Instructed to take am of procedure      magnesium chloride (MAG DELAY) 535 (64 Mg) MG TBCR extended release tablet Take 64 mg by mouth daily      VOLTAREN 1 % GEL Apply topically every morning Per patient, does not measure out medication. Patient places medication on the bottom of both feet once daily.       tolterodine (DETROL LA) 4 MG ER capsule Take 4 mg by mouth every morning       Multiple Vitamins-Minerals (THERAPEUTIC MULTIVITAMIN-MINERALS) tablet Take 1 tablet by mouth every morning LD 7-22-18      warfarin (COUMADIN) 2 MG tablet Take 3 mg by mouth nightly Indications: Takes 3 mg, then 4 mg saturday only LD 7-22-18      tamsulosin (FLOMAX) 0.4 MG capsule Take 0.4 mg by mouth nightly       finasteride (PROSCAR) 5 MG tablet Take 5 mg by mouth Daily fitted with a removable metatarsal pad. Post injection instructions were given. Potential risks, complications, alternative treatments, and procedure prognosis were explained to the patient. Verbal informed consent was obtained from the patient. Betadine and alcohol preparation was performed over plantar fascia. 1 mL of 0.5% Marcaine plain and cc of dexamethasone  4mg  Bilateral heels   injected in standard medial approach plantar fascia. Patient tolerated conical steroid injection well. Band-Aid applied. The patient was educated on a possible steroid flare and the use of ice with frozen water bottle 10 minutes each night discussed. Continue passive and active range of motion stretches and strengthening bilateral plantar fascia and Achilles tendons.     Electronically signed by Beverley Archuleta DPM on 6/24/2019 at 12:01 PM  6/24/2019

## 2019-06-28 ENCOUNTER — APPOINTMENT (OUTPATIENT)
Dept: GENERAL RADIOLOGY | Age: 84
End: 2019-06-28
Payer: MEDICARE

## 2019-06-28 ENCOUNTER — APPOINTMENT (OUTPATIENT)
Dept: CT IMAGING | Age: 84
End: 2019-06-28
Payer: MEDICARE

## 2019-06-28 ENCOUNTER — HOSPITAL ENCOUNTER (EMERGENCY)
Age: 84
Discharge: ANOTHER HOSPITAL | End: 2019-06-28
Attending: EMERGENCY MEDICINE
Payer: MEDICARE

## 2019-06-28 VITALS
HEIGHT: 64 IN | DIASTOLIC BLOOD PRESSURE: 87 MMHG | BODY MASS INDEX: 37.05 KG/M2 | OXYGEN SATURATION: 97 % | HEART RATE: 97 BPM | SYSTOLIC BLOOD PRESSURE: 102 MMHG | RESPIRATION RATE: 20 BRPM | WEIGHT: 217 LBS

## 2019-06-28 DIAGNOSIS — I71.23 ANEURYSM OF DESCENDING THORACIC AORTA: Primary | ICD-10-CM

## 2019-06-28 LAB
ABO/RH: NORMAL
ANTIBODY IDENTIFICATION: NORMAL
ANTIBODY SCREEN: NORMAL
APTT: 40.5 SEC (ref 24.5–35.1)
BASOPHILS ABSOLUTE: 0.06 E9/L (ref 0–0.2)
BASOPHILS RELATIVE PERCENT: 0.7 % (ref 0–2)
DAT POLYSPECIFIC: NORMAL
DR. NOTIFY: NORMAL
EKG ATRIAL RATE: 86 BPM
EKG Q-T INTERVAL: 426 MS
EKG QRS DURATION: 160 MS
EKG QTC CALCULATION (BAZETT): 506 MS
EKG R AXIS: -70 DEGREES
EKG T AXIS: 73 DEGREES
EKG VENTRICULAR RATE: 85 BPM
EOSINOPHILS ABSOLUTE: 0.65 E9/L (ref 0.05–0.5)
EOSINOPHILS RELATIVE PERCENT: 7.2 % (ref 0–6)
HCT VFR BLD CALC: 41 % (ref 37–54)
HEMOGLOBIN: 13.2 G/DL (ref 12.5–16.5)
IMMATURE GRANULOCYTES #: 0.08 E9/L
IMMATURE GRANULOCYTES %: 0.9 % (ref 0–5)
INR BLD: 2.9
LYMPHOCYTES ABSOLUTE: 1.14 E9/L (ref 1.5–4)
LYMPHOCYTES RELATIVE PERCENT: 12.7 % (ref 20–42)
MCH RBC QN AUTO: 31 PG (ref 26–35)
MCHC RBC AUTO-ENTMCNC: 32.2 % (ref 32–34.5)
MCV RBC AUTO: 96.2 FL (ref 80–99.9)
MONOCYTES ABSOLUTE: 0.82 E9/L (ref 0.1–0.95)
MONOCYTES RELATIVE PERCENT: 9.1 % (ref 2–12)
NEUTROPHILS ABSOLUTE: 6.23 E9/L (ref 1.8–7.3)
NEUTROPHILS RELATIVE PERCENT: 69.4 % (ref 43–80)
PDW BLD-RTO: 20 FL (ref 11.5–15)
PLATELET # BLD: 200 E9/L (ref 130–450)
PMV BLD AUTO: 9.7 FL (ref 7–12)
PRO-BNP: 888 PG/ML (ref 0–450)
PROTHROMBIN TIME: 33.4 SEC (ref 9.3–12.4)
RBC # BLD: 4.26 E12/L (ref 3.8–5.8)
TROPONIN: <0.01 NG/ML (ref 0–0.03)
WBC # BLD: 9 E9/L (ref 4.5–11.5)

## 2019-06-28 PROCEDURE — 2500000003 HC RX 250 WO HCPCS: Performed by: EMERGENCY MEDICINE

## 2019-06-28 PROCEDURE — 86870 RBC ANTIBODY IDENTIFICATION: CPT

## 2019-06-28 PROCEDURE — 36415 COLL VENOUS BLD VENIPUNCTURE: CPT

## 2019-06-28 PROCEDURE — 86901 BLOOD TYPING SEROLOGIC RH(D): CPT

## 2019-06-28 PROCEDURE — 84484 ASSAY OF TROPONIN QUANT: CPT

## 2019-06-28 PROCEDURE — C9132 KCENTRA, PER I.U.: HCPCS | Performed by: EMERGENCY MEDICINE

## 2019-06-28 PROCEDURE — 99291 CRITICAL CARE FIRST HOUR: CPT

## 2019-06-28 PROCEDURE — 71045 X-RAY EXAM CHEST 1 VIEW: CPT

## 2019-06-28 PROCEDURE — 86880 COOMBS TEST DIRECT: CPT

## 2019-06-28 PROCEDURE — 96375 TX/PRO/DX INJ NEW DRUG ADDON: CPT

## 2019-06-28 PROCEDURE — 71275 CT ANGIOGRAPHY CHEST: CPT

## 2019-06-28 PROCEDURE — 6360000002 HC RX W HCPCS: Performed by: EMERGENCY MEDICINE

## 2019-06-28 PROCEDURE — 86900 BLOOD TYPING SEROLOGIC ABO: CPT

## 2019-06-28 PROCEDURE — 93005 ELECTROCARDIOGRAM TRACING: CPT | Performed by: PHYSICIAN ASSISTANT

## 2019-06-28 PROCEDURE — 2580000003 HC RX 258: Performed by: EMERGENCY MEDICINE

## 2019-06-28 PROCEDURE — 96368 THER/DIAG CONCURRENT INF: CPT

## 2019-06-28 PROCEDURE — 85610 PROTHROMBIN TIME: CPT

## 2019-06-28 PROCEDURE — 85730 THROMBOPLASTIN TIME PARTIAL: CPT

## 2019-06-28 PROCEDURE — 6360000004 HC RX CONTRAST MEDICATION: Performed by: RADIOLOGY

## 2019-06-28 PROCEDURE — 2580000003 HC RX 258: Performed by: RADIOLOGY

## 2019-06-28 PROCEDURE — 6370000000 HC RX 637 (ALT 250 FOR IP): Performed by: PHYSICIAN ASSISTANT

## 2019-06-28 PROCEDURE — 86902 BLOOD TYPE ANTIGEN DONOR EA: CPT

## 2019-06-28 PROCEDURE — 85025 COMPLETE CBC W/AUTO DIFF WBC: CPT

## 2019-06-28 PROCEDURE — 83880 ASSAY OF NATRIURETIC PEPTIDE: CPT

## 2019-06-28 PROCEDURE — 93010 ELECTROCARDIOGRAM REPORT: CPT | Performed by: INTERNAL MEDICINE

## 2019-06-28 PROCEDURE — 86850 RBC ANTIBODY SCREEN: CPT

## 2019-06-28 PROCEDURE — C9248 INJ, CLEVIDIPINE BUTYRATE: HCPCS | Performed by: EMERGENCY MEDICINE

## 2019-06-28 PROCEDURE — 99292 CRITICAL CARE ADDL 30 MIN: CPT

## 2019-06-28 PROCEDURE — 96365 THER/PROPH/DIAG IV INF INIT: CPT

## 2019-06-28 RX ORDER — FUROSEMIDE 40 MG/1
40 TABLET ORAL DAILY
COMMUNITY
End: 2020-01-01 | Stop reason: SDUPTHER

## 2019-06-28 RX ORDER — SODIUM CHLORIDE 0.9 % (FLUSH) 0.9 %
10 SYRINGE (ML) INJECTION
Status: COMPLETED | OUTPATIENT
Start: 2019-06-28 | End: 2019-06-28

## 2019-06-28 RX ORDER — 0.9 % SODIUM CHLORIDE 0.9 %
250 INTRAVENOUS SOLUTION INTRAVENOUS ONCE
Status: DISCONTINUED | OUTPATIENT
Start: 2019-06-28 | End: 2019-06-28 | Stop reason: HOSPADM

## 2019-06-28 RX ORDER — LABETALOL HYDROCHLORIDE 5 MG/ML
20 INJECTION, SOLUTION INTRAVENOUS ONCE
Status: DISCONTINUED | OUTPATIENT
Start: 2019-06-28 | End: 2019-06-28

## 2019-06-28 RX ORDER — NITROGLYCERIN 0.4 MG/1
0.4 TABLET SUBLINGUAL ONCE
Status: COMPLETED | OUTPATIENT
Start: 2019-06-28 | End: 2019-06-28

## 2019-06-28 RX ORDER — FENTANYL CITRATE 50 UG/ML
25 INJECTION, SOLUTION INTRAMUSCULAR; INTRAVENOUS ONCE
Status: COMPLETED | OUTPATIENT
Start: 2019-06-28 | End: 2019-06-28

## 2019-06-28 RX ORDER — HYDROCODONE BITARTRATE AND ACETAMINOPHEN 5; 325 MG/1; MG/1
1 TABLET ORAL EVERY 8 HOURS PRN
COMMUNITY
End: 2019-07-12 | Stop reason: ALTCHOICE

## 2019-06-28 RX ORDER — NITROGLYCERIN 20 MG/100ML
5 INJECTION INTRAVENOUS CONTINUOUS
Status: DISCONTINUED | OUTPATIENT
Start: 2019-06-28 | End: 2019-06-28 | Stop reason: HOSPADM

## 2019-06-28 RX ORDER — ESMOLOL HYDROCHLORIDE 10 MG/ML
50 INJECTION, SOLUTION INTRAVENOUS CONTINUOUS
Status: DISCONTINUED | OUTPATIENT
Start: 2019-06-28 | End: 2019-06-28 | Stop reason: HOSPADM

## 2019-06-28 RX ADMIN — IOPAMIDOL 90 ML: 755 INJECTION, SOLUTION INTRAVENOUS at 09:21

## 2019-06-28 RX ADMIN — Medication 10 ML: at 09:21

## 2019-06-28 RX ADMIN — NITROGLYCERIN 5 MCG/MIN: 20 INJECTION INTRAVENOUS at 05:00

## 2019-06-28 RX ADMIN — ESMOLOL HYDROCHLORIDE 50 MCG/KG/MIN: 10 INJECTION INTRAVENOUS at 09:47

## 2019-06-28 RX ADMIN — NITROGLYCERIN 0.4 MG: 0.4 TABLET SUBLINGUAL at 08:41

## 2019-06-28 RX ADMIN — PHYTONADIONE 10 MG: 10 INJECTION, EMULSION INTRAMUSCULAR; INTRAVENOUS; SUBCUTANEOUS at 10:04

## 2019-06-28 RX ADMIN — CLEVIPIDINE 2 MG/HR: 0.5 EMULSION INTRAVENOUS at 10:55

## 2019-06-28 RX ADMIN — PROTHROMBIN, COAGULATION FACTOR VII HUMAN, COAGULATION FACTOR IX HUMAN, COAGULATION FACTOR X HUMAN, PROTEIN C, PROTEIN S HUMAN, AND WATER 2500 UNITS: KIT at 10:07

## 2019-06-28 RX ADMIN — FENTANYL CITRATE 25 MCG: 50 INJECTION INTRAMUSCULAR; INTRAVENOUS at 09:49

## 2019-06-28 ASSESSMENT — PAIN DESCRIPTION - FREQUENCY: FREQUENCY: CONTINUOUS

## 2019-06-28 ASSESSMENT — PAIN SCALES - GENERAL
PAINLEVEL_OUTOF10: 10
PAINLEVEL_OUTOF10: 5

## 2019-06-28 ASSESSMENT — PAIN DESCRIPTION - DESCRIPTORS: DESCRIPTORS: ACHING

## 2019-06-28 ASSESSMENT — PAIN SCALES - WONG BAKER: WONGBAKER_NUMERICALRESPONSE: 2

## 2019-06-28 ASSESSMENT — PAIN DESCRIPTION - LOCATION: LOCATION: STERNUM

## 2019-06-28 ASSESSMENT — PAIN DESCRIPTION - PAIN TYPE: TYPE: ACUTE PAIN

## 2019-06-28 NOTE — PROGRESS NOTES
Patient with Tybe B dissection. Agree with transfer to [facility] referral center. No need for formal consult. D/W Dr. Jasper Hu and Dr. Honey Shane.   Kenya Clamp

## 2019-07-01 ENCOUNTER — TELEPHONE (OUTPATIENT)
Dept: FAMILY MEDICINE CLINIC | Age: 84
End: 2019-07-01

## 2019-07-01 PROBLEM — R76.8 RED BLOOD CELL ANTIBODY POSITIVE: Chronic | Status: ACTIVE | Noted: 2019-07-01

## 2019-07-09 ENCOUNTER — TELEPHONE (OUTPATIENT)
Dept: FAMILY MEDICINE CLINIC | Age: 84
End: 2019-07-09

## 2019-07-09 DIAGNOSIS — M54.50 CHRONIC BILATERAL LOW BACK PAIN WITHOUT SCIATICA: Primary | ICD-10-CM

## 2019-07-09 DIAGNOSIS — G89.29 CHRONIC BILATERAL LOW BACK PAIN WITHOUT SCIATICA: Primary | ICD-10-CM

## 2019-07-09 NOTE — TELEPHONE ENCOUNTER
Pt is scheduled to be d/c from [facility] today and Neal Hughes has concerns about pt being able to go up the steps to get to the bedroom. She would like to know if you would order a hospital bed. [facility] declined to order the bed and said that he needs to walk. I scheduled a hospital f/u for 7/24. Wife will let us know if INR on 7/12 can be done by visiting nurses.

## 2019-07-10 NOTE — TELEPHONE ENCOUNTER
Hayden Guevara called from [facility]. They need the DME medical necessity documentation to be in a progress note. We will not be able to do this until pt is seen in the office. Io called and spoke to the pt but he asked that I call back tomorrow and speak to his wife. If they want the hospital bed we will need to change the LABS/MA appt on Friday to a 30 min Hospital fu appt.        Leidy Gutierrez [phone] ext [phone]

## 2019-07-12 ENCOUNTER — OFFICE VISIT (OUTPATIENT)
Dept: FAMILY MEDICINE CLINIC | Age: 84
End: 2019-07-12
Payer: MEDICARE

## 2019-07-12 ENCOUNTER — TELEPHONE (OUTPATIENT)
Dept: FAMILY MEDICINE CLINIC | Age: 84
End: 2019-07-12

## 2019-07-12 DIAGNOSIS — K29.61 OTHER GASTRITIS WITH BLEEDING, UNSPECIFIED CHRONICITY: ICD-10-CM

## 2019-07-12 DIAGNOSIS — R60.0 LOCALIZED EDEMA: ICD-10-CM

## 2019-07-12 DIAGNOSIS — D50.8 IRON DEFICIENCY ANEMIA SECONDARY TO INADEQUATE DIETARY IRON INTAKE: ICD-10-CM

## 2019-07-12 DIAGNOSIS — R53.81 DEBILITY: ICD-10-CM

## 2019-07-12 DIAGNOSIS — G47.33 OSA (OBSTRUCTIVE SLEEP APNEA): ICD-10-CM

## 2019-07-12 DIAGNOSIS — I71.10 THORACIC AORTIC ANEURYSM, RUPTURED: Primary | ICD-10-CM

## 2019-07-12 DIAGNOSIS — M54.50 CHRONIC BILATERAL LOW BACK PAIN WITHOUT SCIATICA: ICD-10-CM

## 2019-07-12 DIAGNOSIS — I48.20 CHRONIC ATRIAL FIBRILLATION (HCC): ICD-10-CM

## 2019-07-12 DIAGNOSIS — R09.02 HYPOXIA: ICD-10-CM

## 2019-07-12 DIAGNOSIS — I10 ESSENTIAL HYPERTENSION: ICD-10-CM

## 2019-07-12 DIAGNOSIS — G89.29 CHRONIC BILATERAL LOW BACK PAIN WITHOUT SCIATICA: ICD-10-CM

## 2019-07-12 DIAGNOSIS — F33.0 MILD EPISODE OF RECURRENT MAJOR DEPRESSIVE DISORDER (HCC): ICD-10-CM

## 2019-07-12 DIAGNOSIS — I50.32 CHRONIC DIASTOLIC CHF (CONGESTIVE HEART FAILURE) (HCC): ICD-10-CM

## 2019-07-12 DIAGNOSIS — I34.0 MODERATE MITRAL REGURGITATION: ICD-10-CM

## 2019-07-12 DIAGNOSIS — H61.23 BILATERAL IMPACTED CERUMEN: ICD-10-CM

## 2019-07-12 DIAGNOSIS — I25.10 CORONARY ARTERY DISEASE INVOLVING NATIVE CORONARY ARTERY OF NATIVE HEART WITHOUT ANGINA PECTORIS: ICD-10-CM

## 2019-07-12 DIAGNOSIS — Z79.01 CHRONIC ANTICOAGULATION: ICD-10-CM

## 2019-07-12 DIAGNOSIS — F03.90 DEMENTIA WITHOUT BEHAVIORAL DISTURBANCE, UNSPECIFIED DEMENTIA TYPE: ICD-10-CM

## 2019-07-12 DIAGNOSIS — D51.8 OTHER VITAMIN B12 DEFICIENCY ANEMIAS: ICD-10-CM

## 2019-07-12 DIAGNOSIS — E11.9 TYPE 2 DIABETES MELLITUS WITHOUT COMPLICATION, WITHOUT LONG-TERM CURRENT USE OF INSULIN (HCC): ICD-10-CM

## 2019-07-12 DIAGNOSIS — N40.1 BENIGN PROSTATIC HYPERPLASIA WITH LOWER URINARY TRACT SYMPTOMS, SYMPTOM DETAILS UNSPECIFIED: ICD-10-CM

## 2019-07-12 DIAGNOSIS — E83.42 HYPOMAGNESEMIA: ICD-10-CM

## 2019-07-12 LAB
DISTANCE WALKED: NORMAL FT
INTERNATIONAL NORMALIZATION RATIO, POC: 1.4
PROTHROMBIN TIME, POC: 16.8
SPO2: NORMAL %

## 2019-07-12 PROCEDURE — 85610 PROTHROMBIN TIME: CPT | Performed by: INTERNAL MEDICINE

## 2019-07-12 PROCEDURE — 99214 OFFICE O/P EST MOD 30 MIN: CPT | Performed by: INTERNAL MEDICINE

## 2019-07-12 RX ORDER — GUAIFENESIN 600 MG/1
600 TABLET, EXTENDED RELEASE ORAL
COMMUNITY
Start: 2019-07-09 | End: 2019-01-01

## 2019-07-12 RX ORDER — TAMSULOSIN HYDROCHLORIDE 0.4 MG/1
0.4 CAPSULE ORAL EVERY EVENING
COMMUNITY

## 2019-07-12 RX ORDER — ALBUTEROL SULFATE 90 UG/1
2 AEROSOL, METERED RESPIRATORY (INHALATION) EVERY 6 HOURS PRN
COMMUNITY
Start: 2019-07-08

## 2019-07-12 RX ORDER — ASPIRIN 81 MG/1
81 TABLET, CHEWABLE ORAL DAILY
COMMUNITY
Start: 2019-07-09

## 2019-07-12 RX ORDER — ATORVASTATIN CALCIUM 40 MG/1
40 TABLET, FILM COATED ORAL DAILY
COMMUNITY
End: 2019-01-01 | Stop reason: SDUPTHER

## 2019-07-12 RX ORDER — DONEPEZIL HYDROCHLORIDE 5 MG/1
5 TABLET, FILM COATED ORAL NIGHTLY
Qty: 30 TABLET | Refills: 5 | Status: SHIPPED | OUTPATIENT
Start: 2019-07-12 | End: 2019-01-01 | Stop reason: SDUPTHER

## 2019-07-12 ASSESSMENT — ENCOUNTER SYMPTOMS
BLOOD IN STOOL: 0
COUGH: 0
CONSTIPATION: 0
EYE PAIN: 0
SORE THROAT: 0
NAUSEA: 0
VOMITING: 0
EYE REDNESS: 1
CHEST TIGHTNESS: 0
SHORTNESS OF BREATH: 1
DIARRHEA: 0
ABDOMINAL PAIN: 0
RHINORRHEA: 0

## 2019-07-12 NOTE — PATIENT INSTRUCTIONS
Coumadin dosage   4mg tues/thurs/sat/sun and 3mg m/w/f     take lasix 40mg alternating with 80mg x 1 weeek

## 2019-07-12 NOTE — PROGRESS NOTES
anticoagulation  continue present treatment  - follows with cardiology  - current dose 3mg daily  - INR today (7/12/19) - 1.4  - increase to 4mg tues/thurs/sat/sun and 3mg m/w/f   - no further bleeding  - inr in 1 week     Chronic atrial fibrillation ([facility]  continue present treatment  - follows with cardiology  - current dose 3mg daily  - INR today (7/12/19) - 1.4  - increase to 4mg tues/thurs/sat/sun and 3mg m/w/f   - no further bleeding  - inr in 1 week  - metoprolol XL changed by [facility] to mtoprolol tart 25mg every 8 hours     Type 2 diabetes mellitus without complication, without long-term current use of insulin (HCC)  - continue present treatment  - off metformin and other meds - lactic acid increased  - last A1c was 6.0 (3/2019)  - watch diet    Other vitamin B12 deficiency anemias  - continue present treatment  - IM B12 injection today     Mixed hyperlipidemia  - continue present treatment  - off simvastain - muscle pain  - stopped niaspan  - added atorvastatin per [facility].    - follow labs     Mild episode of recurrent major depressive disorder ([facility]  - continue present treatment  - did not tolerate cymbalta (tremor)  - now on zoloft 50mg  - stable    MAX (obstructive sleep apnea)  - continue present treatment  - wears nightly up to 9 hours a night  - states has helped symptoms and controlled    Essential hypertension  - continue present treatment  - on diltiazem  - On isosorbide increased to 60mg   - on metoprolol decreased to 25mg daily   - off lisinopril per nephro    Chronic diastolic CHF (congestive heart failure) ([facility]  - following with cardiology and EP  - [id number]%  - on metoprolol  - on lasix  - off ACE due to kidney    Chronic obstructive pulmonary disease, unspecified COPD type ([facility]  - continue present treatment  - off Symbicort, spiriva and Incruse  - follows with pulmonary  - Completed pulmonary rehab    Other gastritis with bleeding, unspecified chronicity  - uncertain etiology  - EGD (12/2018) - gastritis  - colonoscopy not done  - on protonix - decreased to daily   - off carafate  - follow up with surgery  - h-pylori was positive (placed on clarithromycin, amoxicillin and above)    Chronic bilateral low back pain without sciatica  - continue present treatment  - s/p epidural (2015)  - on norco - states has weaned back and taking daily    OARRS report reviewed (6/14/19)  Controlled substance agreement updated (1/11/19)    Patient requests narcotic medication refill. I have reviewed the current medications and prior notes  regarding the need for these refills. I believe the need for refill is warranted at this time. I have  reviewed the OARRS report and found no suspicion of drug seeking behavior. I have discussed the  side effects and narcotic policy with this patient and the patient has demonstrated understanding. A  follow up appointment will be scheduled with me    Controlled Substance Monitoring:    Acute and Chronic Pain Monitoring:   RX Monitoring 6/14/2019   Attestation -   Periodic Controlled Substance Monitoring Possible medication side effects, risk of tolerance/dependence & alternative treatments discussed. ;No signs of potential drug abuse or diversion identified.;Obtaining appropriate analgesic effect of treatment.         Benign prostatic hyperplasia with lower urinary tract symptoms, symptom details unspecified  - continue present treatment  - follows with urology  - on finasteride     Dementia without behavioral disturbance, unspecified dementia type  - Continue present treatment   - mini mental status done (2/11/15)  - score 24-26  - on aricept  - stable on reported side effects    CKD (chronic kidney disease) stage 3, GFR 30-59 ml/min ([facility])  - continue present treatment  - no longer following with nephrology  - s/p ultrasound  - may be multifactorial - meds, DM, HTN  - off lisinopril  - on lasix  - Last Lab (4/2019) - stable    Iron deficiency anemia secondary to inadequate dietary iron

## 2019-07-15 ENCOUNTER — APPOINTMENT (OUTPATIENT)
Dept: GENERAL RADIOLOGY | Age: 84
DRG: 065 | End: 2019-07-15
Payer: MEDICARE

## 2019-07-15 ENCOUNTER — HOSPITAL ENCOUNTER (INPATIENT)
Age: 84
LOS: 1 days | Discharge: HOME HEALTH | DRG: 065 | End: 2019-07-17
Attending: EMERGENCY MEDICINE | Admitting: INTERNAL MEDICINE
Payer: MEDICARE

## 2019-07-15 ENCOUNTER — APPOINTMENT (OUTPATIENT)
Dept: CT IMAGING | Age: 84
DRG: 065 | End: 2019-07-15
Payer: MEDICARE

## 2019-07-15 DIAGNOSIS — G45.9 TIA (TRANSIENT ISCHEMIC ATTACK): Primary | ICD-10-CM

## 2019-07-15 PROBLEM — E87.0 HYPERNATREMIA: Status: ACTIVE | Noted: 2019-07-15

## 2019-07-15 PROBLEM — E87.6 HYPOKALEMIA: Status: ACTIVE | Noted: 2019-07-15

## 2019-07-15 LAB
ALBUMIN SERPL-MCNC: 3.2 G/DL (ref 3.5–5.2)
ALP BLD-CCNC: 107 U/L (ref 40–129)
ALT SERPL-CCNC: 37 U/L (ref 0–40)
ANION GAP SERPL CALCULATED.3IONS-SCNC: 8 MMOL/L (ref 7–16)
APTT: 32 SEC (ref 24.5–35.1)
AST SERPL-CCNC: 32 U/L (ref 0–39)
BACTERIA: ABNORMAL /HPF
BASOPHILS ABSOLUTE: 0.04 E9/L (ref 0–0.2)
BASOPHILS RELATIVE PERCENT: 0.4 % (ref 0–2)
BILIRUB SERPL-MCNC: 0.7 MG/DL (ref 0–1.2)
BILIRUBIN URINE: NEGATIVE
BLOOD, URINE: NEGATIVE
BUN BLDV-MCNC: 21 MG/DL (ref 8–23)
CALCIUM SERPL-MCNC: 9.1 MG/DL (ref 8.6–10.2)
CHLORIDE BLD-SCNC: 108 MMOL/L (ref 98–107)
CHP ED QC CHECK: YES
CLARITY: CLEAR
CO2: 31 MMOL/L (ref 22–29)
COLOR: YELLOW
CREAT SERPL-MCNC: 1.1 MG/DL (ref 0.7–1.2)
CRYSTALS, UA: ABNORMAL
EKG ATRIAL RATE: 78 BPM
EKG Q-T INTERVAL: 438 MS
EKG QRS DURATION: 166 MS
EKG QTC CALCULATION (BAZETT): 521 MS
EKG R AXIS: -69 DEGREES
EKG T AXIS: 84 DEGREES
EKG VENTRICULAR RATE: 85 BPM
EOSINOPHILS ABSOLUTE: 0.31 E9/L (ref 0.05–0.5)
EOSINOPHILS RELATIVE PERCENT: 3.3 % (ref 0–6)
GFR AFRICAN AMERICAN: >60
GFR NON-AFRICAN AMERICAN: >60 ML/MIN/1.73
GLUCOSE BLD-MCNC: 112 MG/DL
GLUCOSE BLD-MCNC: 114 MG/DL (ref 74–99)
GLUCOSE URINE: NEGATIVE MG/DL
HCT VFR BLD CALC: 37.8 % (ref 37–54)
HEMOGLOBIN: 11.9 G/DL (ref 12.5–16.5)
IMMATURE GRANULOCYTES #: 0.14 E9/L
IMMATURE GRANULOCYTES %: 1.5 % (ref 0–5)
INR BLD: 2.1
KETONES, URINE: NEGATIVE MG/DL
LEUKOCYTE ESTERASE, URINE: NEGATIVE
LYMPHOCYTES ABSOLUTE: 0.65 E9/L (ref 1.5–4)
LYMPHOCYTES RELATIVE PERCENT: 7 % (ref 20–42)
MCH RBC QN AUTO: 31.9 PG (ref 26–35)
MCHC RBC AUTO-ENTMCNC: 31.5 % (ref 32–34.5)
MCV RBC AUTO: 101.3 FL (ref 80–99.9)
METER GLUCOSE: 112 MG/DL (ref 74–99)
MONOCYTES ABSOLUTE: 0.8 E9/L (ref 0.1–0.95)
MONOCYTES RELATIVE PERCENT: 8.6 % (ref 2–12)
NEUTROPHILS ABSOLUTE: 7.41 E9/L (ref 1.8–7.3)
NEUTROPHILS RELATIVE PERCENT: 79.2 % (ref 43–80)
NITRITE, URINE: NEGATIVE
PDW BLD-RTO: 17.8 FL (ref 11.5–15)
PH UA: 5.5 (ref 5–9)
PLATELET # BLD: 310 E9/L (ref 130–450)
PMV BLD AUTO: 9.8 FL (ref 7–12)
POTASSIUM SERPL-SCNC: 3.4 MMOL/L (ref 3.5–5)
PRO-BNP: 4454 PG/ML (ref 0–450)
PROTEIN UA: 100 MG/DL
PROTHROMBIN TIME: 24.3 SEC (ref 9.3–12.4)
RBC # BLD: 3.73 E12/L (ref 3.8–5.8)
RBC UA: ABNORMAL /HPF (ref 0–2)
SODIUM BLD-SCNC: 147 MMOL/L (ref 132–146)
SPECIFIC GRAVITY UA: 1.02 (ref 1–1.03)
TOTAL PROTEIN: 6.6 G/DL (ref 6.4–8.3)
UROBILINOGEN, URINE: 1 E.U./DL
WBC # BLD: 9.4 E9/L (ref 4.5–11.5)
WBC UA: ABNORMAL /HPF (ref 0–5)

## 2019-07-15 PROCEDURE — 71045 X-RAY EXAM CHEST 1 VIEW: CPT

## 2019-07-15 PROCEDURE — 6370000000 HC RX 637 (ALT 250 FOR IP): Performed by: CLINICAL NURSE SPECIALIST

## 2019-07-15 PROCEDURE — 80053 COMPREHEN METABOLIC PANEL: CPT

## 2019-07-15 PROCEDURE — 97162 PT EVAL MOD COMPLEX 30 MIN: CPT

## 2019-07-15 PROCEDURE — 6360000002 HC RX W HCPCS: Performed by: CLINICAL NURSE SPECIALIST

## 2019-07-15 PROCEDURE — 96372 THER/PROPH/DIAG INJ SC/IM: CPT

## 2019-07-15 PROCEDURE — 6360000002 HC RX W HCPCS: Performed by: INTERNAL MEDICINE

## 2019-07-15 PROCEDURE — 2580000003 HC RX 258: Performed by: CLINICAL NURSE SPECIALIST

## 2019-07-15 PROCEDURE — G0378 HOSPITAL OBSERVATION PER HR: HCPCS

## 2019-07-15 PROCEDURE — 97535 SELF CARE MNGMENT TRAINING: CPT

## 2019-07-15 PROCEDURE — 85730 THROMBOPLASTIN TIME PARTIAL: CPT

## 2019-07-15 PROCEDURE — 97530 THERAPEUTIC ACTIVITIES: CPT

## 2019-07-15 PROCEDURE — 70450 CT HEAD/BRAIN W/O DYE: CPT

## 2019-07-15 PROCEDURE — 85025 COMPLETE CBC W/AUTO DIFF WBC: CPT

## 2019-07-15 PROCEDURE — 96374 THER/PROPH/DIAG INJ IV PUSH: CPT

## 2019-07-15 PROCEDURE — 82962 GLUCOSE BLOOD TEST: CPT

## 2019-07-15 PROCEDURE — 36415 COLL VENOUS BLD VENIPUNCTURE: CPT

## 2019-07-15 PROCEDURE — 99285 EMERGENCY DEPT VISIT HI MDM: CPT

## 2019-07-15 PROCEDURE — 85610 PROTHROMBIN TIME: CPT

## 2019-07-15 PROCEDURE — 93005 ELECTROCARDIOGRAM TRACING: CPT | Performed by: EMERGENCY MEDICINE

## 2019-07-15 PROCEDURE — 81001 URINALYSIS AUTO W/SCOPE: CPT

## 2019-07-15 PROCEDURE — 97165 OT EVAL LOW COMPLEX 30 MIN: CPT

## 2019-07-15 PROCEDURE — 83880 ASSAY OF NATRIURETIC PEPTIDE: CPT

## 2019-07-15 PROCEDURE — 93010 ELECTROCARDIOGRAM REPORT: CPT | Performed by: INTERNAL MEDICINE

## 2019-07-15 RX ORDER — PANTOPRAZOLE SODIUM 40 MG/1
40 TABLET, DELAYED RELEASE ORAL
Status: DISCONTINUED | OUTPATIENT
Start: 2019-07-16 | End: 2019-07-17 | Stop reason: HOSPADM

## 2019-07-15 RX ORDER — MAGNESIUM CHLORIDE 64 MG
64 TABLET, DELAYED RELEASE (ENTERIC COATED) ORAL DAILY
Status: DISCONTINUED | OUTPATIENT
Start: 2019-07-15 | End: 2019-07-17 | Stop reason: HOSPADM

## 2019-07-15 RX ORDER — ISOSORBIDE MONONITRATE 60 MG/1
60 TABLET, EXTENDED RELEASE ORAL DAILY
Status: DISCONTINUED | OUTPATIENT
Start: 2019-07-15 | End: 2019-07-17 | Stop reason: HOSPADM

## 2019-07-15 RX ORDER — FINASTERIDE 5 MG/1
5 TABLET, FILM COATED ORAL
Status: DISCONTINUED | OUTPATIENT
Start: 2019-07-15 | End: 2019-07-17 | Stop reason: HOSPADM

## 2019-07-15 RX ORDER — SODIUM CHLORIDE 0.9 % (FLUSH) 0.9 %
10 SYRINGE (ML) INJECTION EVERY 12 HOURS SCHEDULED
Status: DISCONTINUED | OUTPATIENT
Start: 2019-07-15 | End: 2019-07-17 | Stop reason: HOSPADM

## 2019-07-15 RX ORDER — ATORVASTATIN CALCIUM 40 MG/1
40 TABLET, FILM COATED ORAL DAILY
Status: DISCONTINUED | OUTPATIENT
Start: 2019-07-15 | End: 2019-07-17 | Stop reason: HOSPADM

## 2019-07-15 RX ORDER — FUROSEMIDE 10 MG/ML
40 INJECTION INTRAMUSCULAR; INTRAVENOUS 2 TIMES DAILY
Status: DISCONTINUED | OUTPATIENT
Start: 2019-07-15 | End: 2019-07-17 | Stop reason: HOSPADM

## 2019-07-15 RX ORDER — SODIUM CHLORIDE 0.9 % (FLUSH) 0.9 %
10 SYRINGE (ML) INJECTION PRN
Status: DISCONTINUED | OUTPATIENT
Start: 2019-07-15 | End: 2019-07-17 | Stop reason: HOSPADM

## 2019-07-15 RX ORDER — FUROSEMIDE 40 MG/1
40 TABLET ORAL DAILY
Status: DISCONTINUED | OUTPATIENT
Start: 2019-07-15 | End: 2019-07-15

## 2019-07-15 RX ORDER — ASPIRIN 81 MG/1
81 TABLET, CHEWABLE ORAL DAILY
Status: DISCONTINUED | OUTPATIENT
Start: 2019-07-15 | End: 2019-07-17 | Stop reason: HOSPADM

## 2019-07-15 RX ORDER — WARFARIN SODIUM 3 MG/1
3 TABLET ORAL NIGHTLY
Status: DISCONTINUED | OUTPATIENT
Start: 2019-07-15 | End: 2019-07-17 | Stop reason: HOSPADM

## 2019-07-15 RX ORDER — ALBUTEROL SULFATE 90 UG/1
2 AEROSOL, METERED RESPIRATORY (INHALATION) EVERY 6 HOURS PRN
Status: DISCONTINUED | OUTPATIENT
Start: 2019-07-15 | End: 2019-07-17 | Stop reason: HOSPADM

## 2019-07-15 RX ORDER — ACETAMINOPHEN 325 MG/1
650 TABLET ORAL EVERY 6 HOURS PRN
COMMUNITY
End: 2020-01-01

## 2019-07-15 RX ORDER — HYDROCODONE BITARTRATE AND ACETAMINOPHEN 5; 325 MG/1; MG/1
1 TABLET ORAL 2 TIMES DAILY PRN
COMMUNITY
End: 2019-01-01 | Stop reason: ALTCHOICE

## 2019-07-15 RX ORDER — ONDANSETRON 2 MG/ML
4 INJECTION INTRAMUSCULAR; INTRAVENOUS EVERY 6 HOURS PRN
Status: DISCONTINUED | OUTPATIENT
Start: 2019-07-15 | End: 2019-07-15

## 2019-07-15 RX ORDER — DONEPEZIL HYDROCHLORIDE 5 MG/1
5 TABLET, FILM COATED ORAL NIGHTLY
Status: DISCONTINUED | OUTPATIENT
Start: 2019-07-15 | End: 2019-07-17 | Stop reason: HOSPADM

## 2019-07-15 RX ADMIN — FUROSEMIDE 40 MG: 10 INJECTION, SOLUTION INTRAMUSCULAR; INTRAVENOUS at 18:54

## 2019-07-15 RX ADMIN — METOPROLOL TARTRATE 25 MG: 25 TABLET ORAL at 21:10

## 2019-07-15 RX ADMIN — SERTRALINE 50 MG: 50 TABLET, FILM COATED ORAL at 13:44

## 2019-07-15 RX ADMIN — WARFARIN SODIUM 3 MG: 3 TABLET ORAL at 21:10

## 2019-07-15 RX ADMIN — ASPIRIN 81 MG 81 MG: 81 TABLET ORAL at 13:44

## 2019-07-15 RX ADMIN — METOPROLOL TARTRATE 25 MG: 25 TABLET ORAL at 13:44

## 2019-07-15 RX ADMIN — MAGNESIUM 64 MG (MAGNESIUM CHLORIDE) TABLET,DELAYED RELEASE 64 MG: at 14:49

## 2019-07-15 RX ADMIN — ALBUTEROL SULFATE 2 PUFF: 90 AEROSOL, METERED RESPIRATORY (INHALATION) at 16:35

## 2019-07-15 RX ADMIN — FINASTERIDE 5 MG: 5 TABLET, FILM COATED ORAL at 18:54

## 2019-07-15 RX ADMIN — ISOSORBIDE MONONITRATE 60 MG: 60 TABLET ORAL at 13:44

## 2019-07-15 RX ADMIN — ATORVASTATIN CALCIUM 40 MG: 40 TABLET, FILM COATED ORAL at 14:49

## 2019-07-15 RX ADMIN — ENOXAPARIN SODIUM 40 MG: 40 INJECTION SUBCUTANEOUS at 14:48

## 2019-07-15 RX ADMIN — FUROSEMIDE 40 MG: 40 TABLET ORAL at 14:48

## 2019-07-15 RX ADMIN — Medication 10 ML: at 21:10

## 2019-07-15 ASSESSMENT — ENCOUNTER SYMPTOMS
COLOR CHANGE: 0
TROUBLE SWALLOWING: 0
SHORTNESS OF BREATH: 0
PHOTOPHOBIA: 0
COUGH: 0
DIARRHEA: 0
NAUSEA: 0
CONSTIPATION: 0
ABDOMINAL PAIN: 0
SINUS PRESSURE: 0
BLOOD IN STOOL: 0
EYE PAIN: 0
VOMITING: 0
SINUS PAIN: 0
EYE REDNESS: 0
VOICE CHANGE: 0
CHEST TIGHTNESS: 0

## 2019-07-15 ASSESSMENT — PAIN SCALES - GENERAL
PAINLEVEL_OUTOF10: 0
PAINLEVEL_OUTOF10: 0

## 2019-07-15 NOTE — ED NOTES
Bed: 15  Expected date:   Expected time:   Means of arrival:   Comments:  ems     Cm Daniel RN  07/15/19 9418

## 2019-07-15 NOTE — ED PROVIDER NOTES
---------------------------  Date / Time Roomed:  7/15/2019  8:23 AM  ED Bed Assignment:  15/15    The nursing notes within the ED encounter and vital signs as below have been reviewed. Patient Vitals for the past 24 hrs:   BP Temp Temp src Pulse Resp SpO2 Weight   07/15/19 1056 (!) 164/85 97.6 °F (36.4 °C) Oral 90 15 98 % --   07/15/19 0956 (!) 163/95 -- -- 94 20 99 % --   07/15/19 0824 (!) 139/100 97.4 °F (36.3 °C) Temporal 98 18 97 % 208 lb 14.4 oz (94.8 kg)       Oxygen Saturation Interpretation: Normal    ------------------------------------------ PROGRESS NOTES ------------------------------------------      Counseling:  I have spoken with the patient and wife and discussed todays results, in addition to providing specific details for the plan of care and counseling regarding the diagnosis and prognosis. Their questions are answered at this time and they are agreeable with the plan of admission.    --------------------------------- ADDITIONAL PROVIDER NOTES ---------------------------------  Consultations:  Time: 1030. Spoke with SOCO Levy.  Discussed case. They will admit the patient. This patient's ED course included: a personal history and physicial examination, re-evaluation prior to disposition, multiple bedside re-evaluations, cardiac monitoring and continuous pulse oximetry    This patient has remained hemodynamically stable during their ED course. Diagnosis:  1. TIA (transient ischemic attack)        Disposition:  Patient's disposition: Admit to telemetry  Patient's condition is stable.                Sarina Emerson DO  Resident  07/15/19 3954

## 2019-07-16 ENCOUNTER — APPOINTMENT (OUTPATIENT)
Dept: MRI IMAGING | Age: 84
DRG: 065 | End: 2019-07-16
Payer: MEDICARE

## 2019-07-16 ENCOUNTER — APPOINTMENT (OUTPATIENT)
Dept: GENERAL RADIOLOGY | Age: 84
DRG: 065 | End: 2019-07-16
Payer: MEDICARE

## 2019-07-16 LAB
ANION GAP SERPL CALCULATED.3IONS-SCNC: 15 MMOL/L (ref 7–16)
BUN BLDV-MCNC: 21 MG/DL (ref 8–23)
CALCIUM SERPL-MCNC: 8.7 MG/DL (ref 8.6–10.2)
CHLORIDE BLD-SCNC: 106 MMOL/L (ref 98–107)
CHOLESTEROL, TOTAL: 87 MG/DL (ref 0–199)
CO2: 26 MMOL/L (ref 22–29)
CREAT SERPL-MCNC: 1 MG/DL (ref 0.7–1.2)
GFR AFRICAN AMERICAN: >60
GFR NON-AFRICAN AMERICAN: >60 ML/MIN/1.73
GLUCOSE BLD-MCNC: 104 MG/DL (ref 74–99)
HBA1C MFR BLD: 6 % (ref 4–5.6)
HDLC SERPL-MCNC: 37 MG/DL
INR BLD: 2.4
LDL CHOLESTEROL CALCULATED: 37 MG/DL (ref 0–99)
MAGNESIUM: 1.6 MG/DL (ref 1.6–2.6)
POTASSIUM REFLEX MAGNESIUM: 3.4 MMOL/L (ref 3.5–5)
PROTHROMBIN TIME: 27.1 SEC (ref 9.3–12.4)
SODIUM BLD-SCNC: 147 MMOL/L (ref 132–146)
TRIGL SERPL-MCNC: 63 MG/DL (ref 0–149)
VLDLC SERPL CALC-MCNC: 13 MG/DL

## 2019-07-16 PROCEDURE — 99222 1ST HOSP IP/OBS MODERATE 55: CPT | Performed by: NURSE PRACTITIONER

## 2019-07-16 PROCEDURE — 80048 BASIC METABOLIC PNL TOTAL CA: CPT

## 2019-07-16 PROCEDURE — 6370000000 HC RX 637 (ALT 250 FOR IP): Performed by: CLINICAL NURSE SPECIALIST

## 2019-07-16 PROCEDURE — 2700000000 HC OXYGEN THERAPY PER DAY

## 2019-07-16 PROCEDURE — G0378 HOSPITAL OBSERVATION PER HR: HCPCS

## 2019-07-16 PROCEDURE — 70551 MRI BRAIN STEM W/O DYE: CPT

## 2019-07-16 PROCEDURE — 80061 LIPID PANEL: CPT

## 2019-07-16 PROCEDURE — 99221 1ST HOSP IP/OBS SF/LOW 40: CPT | Performed by: PSYCHIATRY & NEUROLOGY

## 2019-07-16 PROCEDURE — 6360000002 HC RX W HCPCS: Performed by: CLINICAL NURSE SPECIALIST

## 2019-07-16 PROCEDURE — 83036 HEMOGLOBIN GLYCOSYLATED A1C: CPT

## 2019-07-16 PROCEDURE — 2580000003 HC RX 258: Performed by: CLINICAL NURSE SPECIALIST

## 2019-07-16 PROCEDURE — 92611 MOTION FLUOROSCOPY/SWALLOW: CPT

## 2019-07-16 PROCEDURE — 36415 COLL VENOUS BLD VENIPUNCTURE: CPT

## 2019-07-16 PROCEDURE — 74230 X-RAY XM SWLNG FUNCJ C+: CPT

## 2019-07-16 PROCEDURE — 83735 ASSAY OF MAGNESIUM: CPT

## 2019-07-16 PROCEDURE — 96372 THER/PROPH/DIAG INJ SC/IM: CPT

## 2019-07-16 PROCEDURE — 6360000002 HC RX W HCPCS: Performed by: INTERNAL MEDICINE

## 2019-07-16 PROCEDURE — 2500000003 HC RX 250 WO HCPCS: Performed by: INTERNAL MEDICINE

## 2019-07-16 PROCEDURE — 85610 PROTHROMBIN TIME: CPT

## 2019-07-16 PROCEDURE — 96376 TX/PRO/DX INJ SAME DRUG ADON: CPT

## 2019-07-16 RX ADMIN — DONEPEZIL HYDROCHLORIDE 5 MG: 5 TABLET, FILM COATED ORAL at 22:06

## 2019-07-16 RX ADMIN — FUROSEMIDE 40 MG: 10 INJECTION, SOLUTION INTRAMUSCULAR; INTRAVENOUS at 19:00

## 2019-07-16 RX ADMIN — Medication 10 ML: at 08:50

## 2019-07-16 RX ADMIN — WARFARIN SODIUM 3 MG: 3 TABLET ORAL at 22:06

## 2019-07-16 RX ADMIN — ATORVASTATIN CALCIUM 40 MG: 40 TABLET, FILM COATED ORAL at 08:50

## 2019-07-16 RX ADMIN — FINASTERIDE 5 MG: 5 TABLET, FILM COATED ORAL at 18:30

## 2019-07-16 RX ADMIN — METOPROLOL TARTRATE 25 MG: 25 TABLET ORAL at 16:21

## 2019-07-16 RX ADMIN — BARIUM SULFATE 45 G: 0.6 CREAM ORAL at 14:34

## 2019-07-16 RX ADMIN — MAGNESIUM 64 MG (MAGNESIUM CHLORIDE) TABLET,DELAYED RELEASE 64 MG: at 08:50

## 2019-07-16 RX ADMIN — METOPROLOL TARTRATE 25 MG: 25 TABLET ORAL at 22:06

## 2019-07-16 RX ADMIN — FUROSEMIDE 40 MG: 10 INJECTION, SOLUTION INTRAMUSCULAR; INTRAVENOUS at 08:49

## 2019-07-16 RX ADMIN — SERTRALINE 50 MG: 50 TABLET, FILM COATED ORAL at 08:49

## 2019-07-16 RX ADMIN — ASPIRIN 81 MG 81 MG: 81 TABLET ORAL at 08:50

## 2019-07-16 RX ADMIN — BARIUM SULFATE 58 G: 960 POWDER, FOR SUSPENSION ORAL at 14:34

## 2019-07-16 RX ADMIN — PANTOPRAZOLE SODIUM 40 MG: 40 TABLET, DELAYED RELEASE ORAL at 06:02

## 2019-07-16 RX ADMIN — Medication 10 ML: at 22:06

## 2019-07-16 RX ADMIN — ENOXAPARIN SODIUM 40 MG: 40 INJECTION SUBCUTANEOUS at 08:49

## 2019-07-16 RX ADMIN — ISOSORBIDE MONONITRATE 60 MG: 60 TABLET ORAL at 08:49

## 2019-07-16 RX ADMIN — METOPROLOL TARTRATE 25 MG: 25 TABLET ORAL at 08:50

## 2019-07-16 ASSESSMENT — ENCOUNTER SYMPTOMS: SHORTNESS OF BREATH: 1

## 2019-07-16 ASSESSMENT — PAIN SCALES - GENERAL
PAINLEVEL_OUTOF10: 0

## 2019-07-16 NOTE — CONSULTS
FOOT SURGERY      FRACTURE SURGERY      right wrist     ID OFFICE/OUTPT VISIT,PROCEDURE ONLY Right 7/27/2018    RIGHT FOOT PLANTAR FASCIITIS performed by Jack Avery DPM at [facility].  2009    LASER    UPPER GASTROINTESTINAL ENDOSCOPY N/A 12/21/2018    EGD BIOPSY performed by Blayne Long MD at [facility] ENDOSCOPY     Current Medications:   Current Facility-Administered Medications: albuterol sulfate  (90 Base) MCG/ACT inhaler 2 puff, 2 puff, Inhalation, Q6H PRN  aspirin chewable tablet 81 mg, 81 mg, Oral, Daily  atorvastatin (LIPITOR) tablet 40 mg, 40 mg, Oral, Daily  donepezil (ARICEPT) tablet 5 mg, 5 mg, Oral, Nightly  finasteride (PROSCAR) tablet 5 mg, 5 mg, Oral, Dinner  warfarin (COUMADIN) tablet 3 mg, 3 mg, Oral, Nightly  sertraline (ZOLOFT) tablet 50 mg, 50 mg, Oral, Daily  pantoprazole (PROTONIX) tablet 40 mg, 40 mg, Oral, QAM AC  metoprolol tartrate (LOPRESSOR) tablet 25 mg, 25 mg, Oral, TID  isosorbide mononitrate (IMDUR) extended release tablet 60 mg, 60 mg, Oral, Daily  magnesium chloride (MAG DELAY) extended release tablet 64 mg, 64 mg, Oral, Daily  sodium chloride flush 0.9 % injection 10 mL, 10 mL, Intravenous, 2 times per day  sodium chloride flush 0.9 % injection 10 mL, 10 mL, Intravenous, PRN  magnesium hydroxide (MILK OF MAGNESIA) 400 MG/5ML suspension 30 mL, 30 mL, Oral, Daily PRN  enoxaparin (LOVENOX) injection 40 mg, 40 mg, Subcutaneous, Daily  furosemide (LASIX) injection 40 mg, 40 mg, Intravenous, BID  Allergies:  Bactrim [sulfamethoxazole-trimethoprim];  Morphine; and Percocet [oxycodone-acetaminophen]    REVIEW OF SYSTEMS:  CONSTITUTIONAL: negative for  fevers and chills  EYES:  positive for  glasses  negative for  double vision, blurred vision and visual disturbance  RESPIRATORY:  positive for  dry cough  negative for  dyspnea, hemoptysis, chest pain and pleuritic pain  CARDIOVASCULAR:  negative for  chest pain, dyspnea, palpitations, syncope  GASTROINTESTINAL: negative for nausea, vomiting, diarrhea, constipation and abdominal pain  HEMATOLOGIC/LYMPHATIC:  negative for bleeding  ENDOCRINE: Positive for symptoms including polyuria  MUSCULOSKELETAL: negative for  muscle weakness  NEUROLOGICAL:  positive for memory problems  negative for headaches, dizziness, seizures, speech problems, visual disturbance, tremor, weakness, numbness, syncope, near syncope, pain and tingling    PHYSICAL EXAM:    Vitals:  /68   Pulse 86   Temp 98 °F (36.7 °C) (Temporal)   Resp 18   Wt 208 lb (94.3 kg)   SpO2 97%   BMI 35.70 kg/m²      General Appearance: elderly man that appears his stated age laying in his hospital bed in no acute distress    Cranial Nerves        Cranial nerve II           Visual acuity: corrected with eye glasses           Visual fields:  normal      Cranial nerve III           Pupils:  equal, round, reactive to light      Cranial nerves III, IV, VI           Extraocular Movements: intact      Cranial nerve V           Facial sensation:  intact      Cranial nerve VII           Facial strength: intact      Cranial nerve VIII           Hearing:  abnormal - hearing loss/hard of hearing      Cranial nerve IX           Palate:  intact      Cranial nerve XI         Shoulder shrug:  intact      Cranial nerve XII          Tongue movement:  normal    Motor:    Drift:  absent  Right deltoid 4 out of 5  Left deltoid 4 out of 5  Right biceps 4 out of 5  Left biceps 4 out of 5  Right triceps 4 out of 5  Left triceps 4 out of 5  Right wrist extension 4 out of 5  Left wrist extension grade 4 out of 5  Right wrist flexion grade 4 out of 5  Left wrist flexion grade 4 out of 5  Right hip flexion grade 4 out of 5  Left hip flexion grade 4 out of 5  Right hip extension grade 4 out of 5  Left hip extension grade 4 out of 5  Right knee extension 4 out of 5  Left knee extension 4 out of 5  Right knee flexion 4 out of 5  Left knee flexion 4 out of 5  Right ankle dorsi-flexion 4 out of

## 2019-07-16 NOTE — CONSULTS
Problem Relation Age of Onset    Cancer Mother 70    Heart Disease Father     Stroke Sister        Social history:  [place] status: unknown  Marital status:   Living status: home   Work history: unknown     Allergies   Allergen Reactions    Bactrim [Sulfamethoxazole-Trimethoprim]      Sees bugs on wall    Morphine      Sees bugs on wall    Percocet [Oxycodone-Acetaminophen]      Sees bugs on wall       ROS: UNLESS STATED PATIENT DENIES:  CONSTITUTIONAL:  fever, chill, rigors, nausea, vomiting, fatigue. HEENT: blurry vision, double vision, hearing problem, tinnitus, hoarseness, dysphagia, odynophagia  RESPIRATORY: cough, shortness of breath, sputum expectoration. CARDIOVASCULAR:  Chest pain/pressure, palpitation, syncope, irregular beats  GASTROINTESTINAL:  abdominal or rectal pain, diarrhea, constipation, . GENITOURINARY:  Burning, frequency,+ urgency, incontinence, discharge  INTEGUMENTARY: rash, wound, pruritis  HEMATOLOGIC/LYMPHATIC:  Swelling, sores, gum bleeding, easy bruising, pica.   MUSCULOSKELETAL:  pain, edema, joint swelling or redness  NEUROLOGICAL:  light headed, dizziness, loss of consciousness, weakness, change in memory, seizures, tremors    Objective:     Physical Exam  /68   Pulse 86   Temp 98 °F (36.7 °C) (Temporal)   Resp 18   Wt 208 lb (94.3 kg)   SpO2 97%   BMI 35.70 kg/m²     Gen:  Alert, appears stated age, well nourished, in no acute distress  HEENT:  Normocephalic, conjunctiva pink, no drainage, mucosa moist  Neck:  Supple  Lungs:  CTA / Diminished bilaterally, no audible rhonchi or wheezes noted  Heart[de-identified]  IRR, no murmur, rub, or gallop noted during exam  Abd:  Soft, non tender, non distended, BS+  :  Urgency  Ext:  Moving all extremities, no edema, pulses present 2+  Skin:  Warm and dry  Neuro:  PERRL, Alert, oriented x 3; following commands        Current Medications:  Inpatient medications reviewed: yes  Home Medications reviewed:

## 2019-07-17 VITALS
HEART RATE: 72 BPM | WEIGHT: 208 LBS | TEMPERATURE: 97.5 F | SYSTOLIC BLOOD PRESSURE: 112 MMHG | BODY MASS INDEX: 35.7 KG/M2 | RESPIRATION RATE: 18 BRPM | DIASTOLIC BLOOD PRESSURE: 82 MMHG | OXYGEN SATURATION: 97 %

## 2019-07-17 PROBLEM — I63.9 ACUTE CVA (CEREBROVASCULAR ACCIDENT) (HCC): Status: ACTIVE | Noted: 2019-07-17

## 2019-07-17 LAB
ANION GAP SERPL CALCULATED.3IONS-SCNC: 17 MMOL/L (ref 7–16)
BASOPHILS ABSOLUTE: 0.06 E9/L (ref 0–0.2)
BASOPHILS RELATIVE PERCENT: 0.6 % (ref 0–2)
BUN BLDV-MCNC: 17 MG/DL (ref 8–23)
CALCIUM SERPL-MCNC: 8.9 MG/DL (ref 8.6–10.2)
CHLORIDE BLD-SCNC: 102 MMOL/L (ref 98–107)
CO2: 26 MMOL/L (ref 22–29)
CREAT SERPL-MCNC: 1 MG/DL (ref 0.7–1.2)
EOSINOPHILS ABSOLUTE: 0.35 E9/L (ref 0.05–0.5)
EOSINOPHILS RELATIVE PERCENT: 3.5 % (ref 0–6)
GFR AFRICAN AMERICAN: >60
GFR NON-AFRICAN AMERICAN: >60 ML/MIN/1.73
GLUCOSE BLD-MCNC: 90 MG/DL (ref 74–99)
HCT VFR BLD CALC: 40.2 % (ref 37–54)
HEMOGLOBIN: 12.5 G/DL (ref 12.5–16.5)
IMMATURE GRANULOCYTES #: 0.1 E9/L
IMMATURE GRANULOCYTES %: 1 % (ref 0–5)
INR BLD: 2.4
LYMPHOCYTES ABSOLUTE: 0.96 E9/L (ref 1.5–4)
LYMPHOCYTES RELATIVE PERCENT: 9.5 % (ref 20–42)
MAGNESIUM: 1.8 MG/DL (ref 1.6–2.6)
MCH RBC QN AUTO: 31.6 PG (ref 26–35)
MCHC RBC AUTO-ENTMCNC: 31.1 % (ref 32–34.5)
MCV RBC AUTO: 101.5 FL (ref 80–99.9)
MONOCYTES ABSOLUTE: 0.99 E9/L (ref 0.1–0.95)
MONOCYTES RELATIVE PERCENT: 9.8 % (ref 2–12)
NEUTROPHILS ABSOLUTE: 7.67 E9/L (ref 1.8–7.3)
NEUTROPHILS RELATIVE PERCENT: 75.6 % (ref 43–80)
PDW BLD-RTO: 17.5 FL (ref 11.5–15)
PLATELET # BLD: 291 E9/L (ref 130–450)
PMV BLD AUTO: 9.6 FL (ref 7–12)
POTASSIUM REFLEX MAGNESIUM: 3.4 MMOL/L (ref 3.5–5)
PROTHROMBIN TIME: 27.4 SEC (ref 9.3–12.4)
RBC # BLD: 3.96 E12/L (ref 3.8–5.8)
SODIUM BLD-SCNC: 145 MMOL/L (ref 132–146)
WBC # BLD: 10.1 E9/L (ref 4.5–11.5)

## 2019-07-17 PROCEDURE — 83735 ASSAY OF MAGNESIUM: CPT

## 2019-07-17 PROCEDURE — 99232 SBSQ HOSP IP/OBS MODERATE 35: CPT | Performed by: NURSE PRACTITIONER

## 2019-07-17 PROCEDURE — 96376 TX/PRO/DX INJ SAME DRUG ADON: CPT

## 2019-07-17 PROCEDURE — 80048 BASIC METABOLIC PNL TOTAL CA: CPT

## 2019-07-17 PROCEDURE — 36415 COLL VENOUS BLD VENIPUNCTURE: CPT

## 2019-07-17 PROCEDURE — 6360000002 HC RX W HCPCS: Performed by: INTERNAL MEDICINE

## 2019-07-17 PROCEDURE — 85025 COMPLETE CBC W/AUTO DIFF WBC: CPT

## 2019-07-17 PROCEDURE — 2700000000 HC OXYGEN THERAPY PER DAY

## 2019-07-17 PROCEDURE — 97530 THERAPEUTIC ACTIVITIES: CPT

## 2019-07-17 PROCEDURE — 85610 PROTHROMBIN TIME: CPT

## 2019-07-17 PROCEDURE — 6360000002 HC RX W HCPCS: Performed by: CLINICAL NURSE SPECIALIST

## 2019-07-17 PROCEDURE — 2140000000 HC CCU INTERMEDIATE R&B

## 2019-07-17 PROCEDURE — 96372 THER/PROPH/DIAG INJ SC/IM: CPT

## 2019-07-17 PROCEDURE — 6370000000 HC RX 637 (ALT 250 FOR IP): Performed by: CLINICAL NURSE SPECIALIST

## 2019-07-17 PROCEDURE — 6370000000 HC RX 637 (ALT 250 FOR IP): Performed by: INTERNAL MEDICINE

## 2019-07-17 RX ORDER — POTASSIUM BICARBONATE 25 MEQ/1
50 TABLET, EFFERVESCENT ORAL ONCE
Status: DISCONTINUED | OUTPATIENT
Start: 2019-07-17 | End: 2019-07-17

## 2019-07-17 RX ADMIN — METOPROLOL TARTRATE 25 MG: 25 TABLET ORAL at 14:30

## 2019-07-17 RX ADMIN — ATORVASTATIN CALCIUM 40 MG: 40 TABLET, FILM COATED ORAL at 09:03

## 2019-07-17 RX ADMIN — ASPIRIN 81 MG 81 MG: 81 TABLET ORAL at 09:04

## 2019-07-17 RX ADMIN — ALBUTEROL SULFATE 2 PUFF: 90 AEROSOL, METERED RESPIRATORY (INHALATION) at 18:09

## 2019-07-17 RX ADMIN — ISOSORBIDE MONONITRATE 60 MG: 60 TABLET ORAL at 09:02

## 2019-07-17 RX ADMIN — PANTOPRAZOLE SODIUM 40 MG: 40 TABLET, DELAYED RELEASE ORAL at 06:47

## 2019-07-17 RX ADMIN — POTASSIUM BICARBONATE 40 MEQ: 782 TABLET, EFFERVESCENT ORAL at 09:00

## 2019-07-17 RX ADMIN — METOPROLOL TARTRATE 25 MG: 25 TABLET ORAL at 09:01

## 2019-07-17 RX ADMIN — ENOXAPARIN SODIUM 40 MG: 40 INJECTION SUBCUTANEOUS at 09:00

## 2019-07-17 RX ADMIN — MAGNESIUM 64 MG (MAGNESIUM CHLORIDE) TABLET,DELAYED RELEASE 64 MG: at 09:02

## 2019-07-17 RX ADMIN — SERTRALINE 50 MG: 50 TABLET, FILM COATED ORAL at 09:02

## 2019-07-17 RX ADMIN — FUROSEMIDE 40 MG: 10 INJECTION, SOLUTION INTRAMUSCULAR; INTRAVENOUS at 18:10

## 2019-07-17 RX ADMIN — FUROSEMIDE 40 MG: 10 INJECTION, SOLUTION INTRAMUSCULAR; INTRAVENOUS at 09:00

## 2019-07-17 NOTE — DISCHARGE SUMMARY
pharyngeal delay visualized. Retention within the vallecula and piriform sinuses was also seen. Please refer to speech pathologist note(s) for additional findings and   recommendations based on this study. This procedure was performed by Radha Siddiqui PA-C under the indirect   supervision of Shaina Fishman MD   who was not present for the   procedure. .      The exam has been dictated and signed by Radha Siddiqui PA-C. Veronica Dela Cruz MD , Radiologist, have reviewed the images and   report and concur with these findings. CT Head WO Contrast   Final Result   1. No CT evidence of acute intracranial abnormality, as questioned. Given the provided history, further evaluation with MRI of the brain   is recommended. 2. Moderate cerebral volume loss/atrophy with white matter changes   suggestive of early small vessel ischemic disease. 3. Vascular calcifications within the bilateral internal carotid   artery cavernous segments. XR CHEST PORTABLE   Final Result      There is a small left pleural effusion with associated atelectasis   and/or consolidation. Otherwise no significant change from previous   exam.            Discharge Medications:     Current Discharge Medication List           Details   acetaminophen (TYLENOL) 325 MG tablet Take 650 mg by mouth every 6 hours as needed for Pain      diclofenac sodium 1 % GEL Apply topically daily to bottom of both feet      HYDROcodone-acetaminophen (NORCO) 5-325 MG per tablet Take 1 tablet by mouth 2 times daily as needed for Pain.       albuterol sulfate HFA (PROVENTIL HFA) 108 (90 Base) MCG/ACT inhaler Inhale 2 puffs into the lungs every 6 hours as needed for Wheezing or Shortness of Breath       aspirin 81 MG chewable tablet Take 81 mg by mouth daily       atorvastatin (LIPITOR) 40 MG tablet Take 40 mg by mouth daily      metoprolol tartrate (LOPRESSOR) 25 MG tablet Take 1 tablet by mouth 3 times daily       tamsulosin (FLOMAX) 0.4 MG capsule

## 2019-07-17 NOTE — PROGRESS NOTES
Moi Mittal is a 80 y.o. right handed male     Neurology is following for stroke    (+) vision changes--c/o blurred vision  (+) slurred speech  (+) BLE weakness chronic  No other complaints--denies dizziness, headache, swallowing difficulties, numbness/tingling of extremities     Medically stable with elevated Bps intermittently     Wife, daughter and son in law at bedside     Objective:     /83   Pulse 72   Temp 97.5 °F (36.4 °C) (Temporal)   Resp 18   Wt 208 lb (94.3 kg)   SpO2 97%   BMI 35.70 kg/m²     General appearance: alert, appears stated age, cooperative and no distress  Head: normocephalic, without obvious abnormality, atraumatic  Eyes: conjunctivae/corneas clear.  .  Neck: symmetrical, trachea midline and thyroid not enlarged   Extremities: normal, atraumatic, no cyanosis or edema  Skin: BUE bruising noted     Mental Status: Alert, oriented x4, thought content appropriate     Appropriate attention/concentration  Intact fundus of knowledge  Intact memories    Speech: mild dysarthria  Language: no aphasias    Cranial Nerves:  I: smell NA   II: visual acuity  NA   II: visual fields Dense R VF loss, L eye R HH   II: pupils PERRL   III,VII: ptosis None   III,IV,VI: extraocular muscles  EOMI without nystagmus    V: mastication Normal   V: facial light touch sensation  Normal   V,VII: corneal reflex     VII: facial muscle function - upper  Normal   VII: facial muscle function - lower Normal   VIII: hearing Normal   IX: soft palate elevation  Normal   IX,X: gag reflex    XI: trapezius strength  5/5   XI: sternocleidomastoid strength 5/5   XI: neck extension strength  5/5   XII: tongue strength  Normal     Motor:  BUE grossly 4/5--no drifts  BLE grossly 4/5--chronic and uses walker at home at baseline  Normal bulk and tone  No abnormal movements     Sensory:  LT intact throughout    Coordination:   FNF exam intact b/l  MICHAEL and FFM intact b/l  Impaired HKS exam d/t weakness    Gait:  Not tested
only with use of a straw   a spontaneous cough was noted                 ASPIRATION    Aspiration was not present during this evaluation         COMPENSATORY STRATEGIES       Compensatory strategies that were beneficial included Double swallow, Alternate solids and liquids and No straw      STRUCTURAL/FUNCTIONAL ANOMALIES       No structural/functional anomalies were noted      CERVICAL ESOPHAGEAL STAGE :        The cervical esophagus appeared adequate                    [facility]  Speech Language Pathologist Student Intern              The Speech Language Pathologist (SLP) completed education with the patient regarding results of evaluation. Explained that Speech Pathology intervention is not warranted at this time, Prognosis for improvements is good, This plan will be re-evaluated and revised in 1 week  if warranted. , The Patient understand the diagnosis, prognosis and plan of care. [x]The admitting diagnosis and active problem list, as listed below have been reviewed prior to initiation of this evaluation.      ADMITTING DIAGNOSIS: TIA (transient ischemic attack) [G45.9]  TIA (transient ischemic attack) [G45.9]     ACTIVE PROBLEM LIST:   Patient Active Problem List   Diagnosis    Chronic atrial fibrillation (HCC)    Mixed hyperlipidemia    Moderate mitral regurgitation    BPH (benign prostatic hyperplasia)    Chronic diastolic CHF (congestive heart failure) ([facility])    Type 2 diabetes mellitus without complication, without long-term current use of insulin ([facility])    Essential hypertension    MAX (obstructive sleep apnea)    CAD (coronary artery disease)    Chronic anticoagulation    COPD (chronic obstructive pulmonary disease) ([facility])    Dementia    CKD (chronic kidney disease) stage 3, GFR 30-59 ml/min ([facility])    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
administered as needed. Frequency of treatments: 2-5x/week x 2-3 days.     Time in: 1545  Time out: [address], [address] [facility], DPT  [id number]
minutes    [place], [place], OTR/L [id number]

## 2019-07-19 RX ORDER — TOLTERODINE 4 MG/1
4 CAPSULE, EXTENDED RELEASE ORAL EVERY MORNING
Qty: 30 CAPSULE | Refills: 2 | Status: SHIPPED | OUTPATIENT
Start: 2019-07-19 | End: 2019-01-01 | Stop reason: SDUPTHER

## 2019-07-23 NOTE — PROGRESS NOTES
Previous INR:  2.4    Previous dose: 3mg daily     Current INR: 3.90    Recommendation: Hold dose x 1 then reduce to 3mg m/w/f and 2 mg all others     Next INR: 1 week     Markell Crump MD  7/23/2019  5:16 PM

## 2019-08-06 NOTE — PROGRESS NOTES
Rate 92. Toprol-XL increased to 50 every morning. 49. Outpatient cardiac follow-up, 04/18/2019. Worsening dyspnea. Toprol-XL decreased to 25 daily     Review of Systems:  Constitutional: negative for fever and chills  Respiratory: negative for cough and hemoptysis  Cardiovascular:   Gastrointestinal: negative for abdominal pain, diarrhea, nausea and vomiting  Genitourinary:negative for dysuria and hematuria  Derm: negative for rash and skin lesion(s)  Neurological: negative for seizures and tremors  Endocrine: negative for diabetic symptoms including polydipsia and polyuria  Musculoskeletal: negative for CTD  Psychiatric: negative for psychosis and major depression    On examination,  skin is warm and dry. Respirations are unlabored. /64   Pulse 81   Resp 16   Ht 5' 4\" (1.626 m)   Wt 217 lb (98.4 kg)   BMI 37.25 kg/m² . HEENT negative for scleral icterus. Extraocular muscles intact. No facial asymmetry or central cyanosis. Neck without masses or goiter. No bruit or JVD. Cardiac apex not displaced. Rhythm regular. Abdomen normal.  Extremities without edema. EKG today shows atrial fibrillation with right bundle branch block. Left axis. PVCs. The patient has had gradual improvement since hospital discharge. He is ambulatory room to room, but wears O2 via nasal cannula continuously. He remains at risk for respiratory and cardiac decompensation and will be seen back by cardiology in the next 2 weeks when an echo will be performed. He may be switched at that time to metoprolol succinate. The target INR remains 2.5. Range is 2.5-3 This is being followed through Dr. Harsha Bills office.     At completion of today's visit, medications include the following:    Current Outpatient Medications:     metoprolol tartrate (LOPRESSOR) 25 MG tablet, Take 1 tablet by mouth 3 times daily, Disp: 270 tablet, Rfl: 3    atorvastatin (LIPITOR) 40 MG tablet, Take 1 tablet by mouth daily, Disp: 30 tablet,

## 2019-08-08 NOTE — PROGRESS NOTES
Wife informed. Santos's last visit will be next Wed. I also called and gave Rochelle Marcelo a verbal to do an INR next Wed.

## 2019-08-08 NOTE — PROGRESS NOTES
Previous INR:  3.00    Previous dose:  3mg m/f and 2 mg all others    Current INR: 2.10    Recommendation:  Continue to 3mg m/f and 2 mg all others     Next INR: 1 week     May need follow up appointment scheduled? ?    Nikolai Webb MD  8/8/2019  9:01 AM

## 2019-08-26 PROBLEM — E87.6 HYPOKALEMIA: Status: RESOLVED | Noted: 2019-07-15 | Resolved: 2019-01-01

## 2019-08-26 PROBLEM — E87.0 HYPERNATREMIA: Status: RESOLVED | Noted: 2019-07-15 | Resolved: 2019-01-01

## 2019-08-26 PROBLEM — G45.9 TIA (TRANSIENT ISCHEMIC ATTACK): Status: RESOLVED | Noted: 2019-07-15 | Resolved: 2019-01-01

## 2019-08-26 NOTE — PROGRESS NOTES
[facility]) Physicians   Internal Medicine     2019  Ambika Stephen : 1929 Sex: male  Age:89 y.o. Chief Complaint   Patient presents with    Office Visit for Anticoagulation Management    Chronic Pain    Hypertension    Hyperlipidemia    Diabetes    Other        HPI:   Patient presents to office for review and management of chronic medical conditions.    - States since last visit had stroke. States felt to be due to low INR. States affected vision in right eye and affected swallowing. States was seen by neurology and cardiology. States swallowing has improved, States in physical and occupational therapy. Metoprolol tartrate was changed to succinate. 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 States life flight to [facility]. States had procedure which sounds like stent placement. States developed CHF as well. There was a concern that needed oxygen. No further chest pain. States had GI bleeding. Resolved. States was seen by surgery and had EGD - showed gastritis. Biopsy showed H pylori. States just on pantoprazole currently. - States has atrial fibrillation. States follows with cardiology. Hospital admit for CHF in 2017,2018. Stable currently, last follow up 2019. On metoprolol changed to succinate 75mg daily. Added imdur 60mg. Also had sinus pauses. States was seen by EP as outpatient and given 24 hour monitor. Follow up in 3 months     - States on long term anticoagulation. States taking coumadin. Currently on 4mg Saturday and 3mg daily. No current bleeding. INR today was 1.6    - States has urinary frequency. - States has chronic recurrent cerumen impaction. Stable. - States having depression. Had increased fatigue and lack of motivation. States not able to do things like before. On Zoloft. No reported side effects.    - States Chronic kidney disease, no longer following with nephrology. States off lisinopril. Had US kidney.  On Lasix 40mg daily    - States has seen dermatology. States has some lesions removed from right arm. Previously had lesions removed from back of neck near left ear and right arm. States was told cancer. No reports. Last visit (9/7/2018) - lesion removed from the left ear. - States still with b/l ankle pain. Stable. States worse with walking. States can ambulate but using cane. States very weak. States has seen podiatry and had injections which seem to have helped. Recently referred to physical therapy - did not help. Still with heel spur, s/p plantar fascial release    - Patient reports pain to the lower back. Unchanged. Worse in am. Stable with medications. States right thigh, groin and lower back. States stopped [place] at [facility] (7/2019). States will also use Voltaren gel. States worse with standing and walking. Denies numbness or tingling to the distal extremity. No fever or chills. States finished physical therapy (3/2016). Had seen pain management has had epidural in past.    - States concerned about memory. On Aricept. States tolerating medicine. Stable. - Has b12 deficiency. On Vitamin B12 - IM    - States still having sob. Stable. Following with pulmonary. No changes and stable. Stopped inhalers. States stopped going to pulmonary rehab. No changes.    - States Has diabetes. States not checking blood sugar at home. States no hypoglycemia symptoms. off metformin and all other DM meds. - States some constipation. States improved    Health Maintenance   - immunizations:   Influenza Vaccination - (9/26/2018)  Pneumonia Vaccination - (11/2016) - prevnar, (11/2017) - pneumococcal  Zoster/Shingles Vaccine  Tetanus Vaccination    - Screenings:   Colonoscopy - (2013) Dr. Zenaida Granda on [facility] Safety - (11/13/2017)    Stress Test - (6/2015) - negative    EGD - (12/2018) - active gastritis    ROS:  Review of Systems   Constitutional: Negative for appetite change, chills, fever and unexpected weight change. Bed for positioning needs not possible in an ordinary bed.  Patient requires frequent and immediate positioning changes for relief of pain and care needs related to medical diagnoses.  Patient needs variability of bed height requirements to perform   patient transfers and for personal cares and ambulating      Current body weight:  215lbs    Chronic anticoagulation  continue present treatment  - follows with cardiology  - current dose 3mg M/F and 2mg all others daily  - INR today (8/26/19) - 1.6  - increase to 3mg tues/thurs/sat/sun and 2mg m/w/f   - no further bleeding  - inr in 1 week   - would like to explore eliquis - would dose 2.5mg since age > [de-identified]    Chronic atrial fibrillation (HCC)  continue present treatment  - follows with cardiology  - current dose 3mg M/F and 2mg all others daily  - INR today (8/26/19) - 1.6  - increase to 3mg tues/thurs/sat/sun and 2mg m/w/f   - no further bleeding  - inr in 1 week  - metoprolol XL changed by [facility] to mtoprolol tart 25mg every 8 hours     Type 2 diabetes mellitus without complication, without long-term current use of insulin (HCC)  - continue present treatment  - off metformin and other meds - lactic acid increased  - last A1c was 6.0 (3/2019)  - watch diet    Other vitamin B12 deficiency anemias  - continue present treatment  - IM B12 injection today     Mixed hyperlipidemia  - continue present treatment  - off simvastain - muscle pain  - stopped niaspan  - added atorvastatin per [facility].    - follow labs     Mild episode of recurrent major depressive disorder ([facility] [address]  - continue present treatment  - did not tolerate cymbalta (tremor)  - now on zoloft 50mg  - stable    MAX (obstructive sleep apnea)  - continue present treatment  - wears nightly up to 9 hours a night  - states has helped symptoms and controlled    Essential hypertension  - continue present treatment  - on diltiazem  - On isosorbide increased to 60mg   - on metoprolol xl 75mg    - off lisinopril per nephro    Chronic diastolic CHF (congestive heart failure) ([facility] [address]  - following with cardiology and EP  - VH48-64H%  - on metoprolol  - on lasix  - off ACE due to kidney    Chronic obstructive pulmonary disease, unspecified COPD type ([facility] [address]  - continue present treatment  - off Symbicort, spiriva and Incruse  - follows with pulmonary  - Completed pulmonary rehab    Other gastritis with bleeding, unspecified chronicity  - uncertain etiology  - EGD (12/2018) - gastritis  - colonoscopy not done  - on protonix - decreased to daily   - off carafate  - follow up with surgery  - h-pylori was positive (placed on clarithromycin, amoxicillin and above)    Chronic bilateral low back pain without sciatica  - continue present treatment  - s/p epidural (2015)  - on norco - not taking currently     [place] report reviewed (6/14/19)  Controlled substance agreement updated (1/11/19)    Benign prostatic hyperplasia with lower urinary tract symptoms, symptom details unspecified  - continue present treatment  - follows with urology  - on finasteride     Dementia without behavioral disturbance, unspecified dementia type  - Continue present treatment   - mini mental status done (2/11/15)  - score 24-26  - on aricept  - stable on reported side effects    CKD (chronic kidney disease) stage 3, GFR 30-59 ml/min ([facility])  - continue present treatment  - no longer following with nephrology  - s/p ultrasound  - may be multifactorial - meds, DM, HTN  - off lisinopril  - on lasix  - Last Lab (4/2019) - stable    Iron deficiency anemia secondary to inadequate dietary iron intake  - on iron m/w/f   - follow labs      Localized edema  - continue present treatment  - on lasix  - stable  - on 40mg daily    Hypomagnesemia  - Continue present treatment   - on otc supplement   - follow labs     Cerumen impaction  - continue present treatment     Return in about 1 week (around 9/2/2019) for check up and review.       No orders of the defined types were placed in

## 2019-09-25 PROBLEM — E11.10 TYPE 2 DIABETES MELLITUS WITH KETOACIDOSIS WITHOUT COMA, WITH LONG-TERM CURRENT USE OF INSULIN (HCC): Status: RESOLVED | Noted: 2019-06-24 | Resolved: 2019-01-01

## 2019-09-25 PROBLEM — Z79.4 TYPE 2 DIABETES MELLITUS WITH KETOACIDOSIS WITHOUT COMA, WITH LONG-TERM CURRENT USE OF INSULIN (HCC): Status: RESOLVED | Noted: 2019-06-24 | Resolved: 2019-01-01

## 2019-09-25 NOTE — PROGRESS NOTES
with b/l ankle pain. Stable. States worse with walking. States can ambulate but using cane. States very weak. States has seen podiatry and had injections which seem to have helped. Recently referred to physical therapy - did not help. Still with heel spur, s/p plantar fascial release    - Patient reports pain to the lower back. Unchanged. Worse in am. Stable with medications. States right thigh, groin and lower back. States stopped [place] at [facility] (7/2019). States will also use Voltaren gel. States worse with standing and walking. Denies numbness or tingling to the distal extremity. No fever or chills. States finished physical therapy (3/2016). Had seen pain management has had epidural in past.    - States concerned about memory. On Aricept. States tolerating medicine. Stable. - Has b12 deficiency. On Vitamin B12 - IM    - States still having sob. Stable. Following with pulmonary. No changes and stable. Stopped inhalers. States stopped going to pulmonary rehab. No changes.    - States Has diabetes. States not checking blood sugar at home. States no hypoglycemia symptoms. off metformin and all other DM meds. - States some constipation. States improved    Health Maintenance   - immunizations:   Influenza Vaccination - (9/26/2018)  Pneumonia Vaccination - (11/2016) - prevnar, (11/2017) - pneumococcal  Zoster/Shingles Vaccine  Tetanus Vaccination    - Screenings:   Colonoscopy - (2013) Dr. Mariah Srinivasan on Home Safety - (11/13/2017)    Stress Test - (6/2015) - negative    EGD - (12/2018) - active gastritis    ROS:  Review of Systems   Constitutional: Negative for appetite change, chills, fever and unexpected weight change. HENT: Negative for congestion, rhinorrhea and sore throat. Eyes: Positive for redness. Negative for pain and visual disturbance. Respiratory: Positive for shortness of breath. Negative for cough and chest tightness. Cardiovascular: Negative for chest pain and palpitations. 1 tablet by mouth every morning (before breakfast), Disp: 30 tablet, Rfl: 3    magnesium chloride (MAG DELAY) 535 (64 Mg) MG TBCR extended release tablet, Take 64 mg by mouth daily, Disp: , Rfl:     Multiple Vitamins-Minerals (THERAPEUTIC MULTIVITAMIN-MINERALS) tablet, Take 1 tablet by mouth every morning , Disp: , Rfl:     finasteride (PROSCAR) 5 MG tablet, Take 5 mg by mouth Daily with supper , Disp: , Rfl:     Allergies   Allergen Reactions    Bactrim [Sulfamethoxazole-Trimethoprim]      Sees bugs on wall    Morphine      Sees bugs on wall    Percocet [Oxycodone-Acetaminophen]      Sees bugs on wall       Past Medical History:   Diagnosis Date    Atrial fibrillation (HCC)     sees Dr. Kevin Coughlin BPH (benign prostatic hyperplasia)     CAD (coronary artery disease)     Chronic anticoagulation     Chronic diastolic CHF (congestive heart failure) (HCC)     CKD (chronic kidney disease) stage 3, GFR 30-59 ml/min (HCC)     COPD (chronic obstructive pulmonary disease) (HCC)     STABLE, SOB WITH EXERTION    Dementia     Hyperlipidemia     Hypertension     Nephrolithiasis     Non-insulin dependent type 2 diabetes mellitus (HCC)     no medications     MAX (obstructive sleep apnea)     USES C PAP    Plantar fasciitis of right foot     for OR 7-27-18     RBBB        Past Surgical History:   Procedure Laterality Date    BACK SURGERY      LUM LAMI, CORPECTOMY, T4-L1, L1-L2    CARDIOVERSION  3/2007, 10/2008, 2/2012    COLONOSCOPY      ECHO COMPL W DOP COLOR FLOW  6/7/2015         EYE SURGERY      CATARACTS    FEMUR SURGERY      right     FOOT SURGERY      FRACTURE SURGERY      right wrist     [place] OFFICE/OUTPT VISIT,PROCEDURE ONLY Right 7/27/2018    RIGHT FOOT PLANTAR FASCIITIS performed by Merle Trejo DPM at [address] [place] [address].  2009    LASER    UPPER GASTROINTESTINAL ENDOSCOPY N/A 12/21/2018    EGD BIOPSY performed by Luis Clayton MD at [facility] ENDOSCOPY       Family History   Problem Specific Question:   No of Hours?      Answer:   0    FERRITIN     Standing Status:   Future     Standing Expiration Date:   9/25/2020    VITAMIN B12 & FOLATE     Standing Status:   Future     Standing Expiration Date:   9/25/2020    Hemoglobin A1C     Standing Status:   Future     Standing Expiration Date:   9/25/2020     Requested Prescriptions      No prescriptions requested or ordered in this encounter       Angle Sharma MD  9/25/2019  5:15 PM

## 2019-12-09 PROBLEM — I73.9 PERIPHERAL VASCULAR DISEASE, UNSPECIFIED (HCC): Status: RESOLVED | Noted: 2019-06-24 | Resolved: 2019-01-01

## 2019-12-09 PROBLEM — M77.51 BURSITIS OF BOTH FEET: Status: RESOLVED | Noted: 2019-06-24 | Resolved: 2019-01-01

## 2019-12-09 PROBLEM — M77.52 BURSITIS OF BOTH FEET: Status: RESOLVED | Noted: 2019-06-24 | Resolved: 2019-01-01

## 2019-12-09 PROBLEM — M79.674 PAIN IN TOE OF RIGHT FOOT: Status: RESOLVED | Noted: 2019-06-24 | Resolved: 2019-01-01

## 2019-12-09 PROBLEM — R26.2 DIFFICULTY WALKING: Status: RESOLVED | Noted: 2019-06-24 | Resolved: 2019-01-01

## 2019-12-09 PROBLEM — B35.1 TINEA UNGUIUM: Status: RESOLVED | Noted: 2019-06-24 | Resolved: 2019-01-01

## 2019-12-09 PROBLEM — M79.675 PAIN IN LEFT TOE(S): Status: RESOLVED | Noted: 2019-06-24 | Resolved: 2019-01-01

## 2020-01-01 ENCOUNTER — HOSPITAL ENCOUNTER (INPATIENT)
Age: 85
LOS: 2 days | Discharge: ANOTHER HOSPITAL | DRG: 189 | End: 2020-07-03
Attending: EMERGENCY MEDICINE | Admitting: INTERNAL MEDICINE
Payer: MEDICARE

## 2020-01-01 ENCOUNTER — TELEPHONE (OUTPATIENT)
Dept: PRIMARY CARE CLINIC | Age: 85
End: 2020-01-01

## 2020-01-01 ENCOUNTER — VIRTUAL VISIT (OUTPATIENT)
Dept: CARDIOLOGY CLINIC | Age: 85
End: 2020-01-01
Payer: MEDICARE

## 2020-01-01 ENCOUNTER — HOSPITAL ENCOUNTER (OUTPATIENT)
Age: 85
Discharge: HOME | End: 2020-07-21
Payer: COMMERCIAL

## 2020-01-01 ENCOUNTER — TELEPHONE (OUTPATIENT)
Dept: FAMILY MEDICINE CLINIC | Age: 85
End: 2020-01-01

## 2020-01-01 ENCOUNTER — APPOINTMENT (OUTPATIENT)
Dept: GENERAL RADIOLOGY | Age: 85
DRG: 189 | End: 2020-01-01
Payer: MEDICARE

## 2020-01-01 ENCOUNTER — HOSPITAL ENCOUNTER (INPATIENT)
Age: 85
LOS: 1 days | Discharge: ANOTHER HOSPITAL | DRG: 314 | End: 2020-06-13
Attending: EMERGENCY MEDICINE | Admitting: FAMILY MEDICINE
Payer: MEDICARE

## 2020-01-01 ENCOUNTER — APPOINTMENT (OUTPATIENT)
Dept: CT IMAGING | Age: 85
DRG: 189 | End: 2020-01-01
Payer: MEDICARE

## 2020-01-01 ENCOUNTER — APPOINTMENT (OUTPATIENT)
Dept: CT IMAGING | Age: 85
DRG: 314 | End: 2020-01-01
Payer: MEDICARE

## 2020-01-01 ENCOUNTER — TELEPHONE (OUTPATIENT)
Dept: ADMINISTRATIVE | Age: 85
End: 2020-01-01

## 2020-01-01 ENCOUNTER — PROCEDURE VISIT (OUTPATIENT)
Dept: PODIATRY | Age: 85
End: 2020-01-01
Payer: MEDICARE

## 2020-01-01 ENCOUNTER — CARE COORDINATION (OUTPATIENT)
Dept: CARE COORDINATION | Age: 85
End: 2020-01-01

## 2020-01-01 ENCOUNTER — TELEPHONE (OUTPATIENT)
Dept: CARDIOLOGY CLINIC | Age: 85
End: 2020-01-01

## 2020-01-01 VITALS — WEIGHT: 220 LBS | BODY MASS INDEX: 37.56 KG/M2 | HEIGHT: 64 IN

## 2020-01-01 VITALS
BODY MASS INDEX: 38.07 KG/M2 | HEART RATE: 81 BPM | TEMPERATURE: 97.2 F | WEIGHT: 223 LBS | SYSTOLIC BLOOD PRESSURE: 114 MMHG | DIASTOLIC BLOOD PRESSURE: 73 MMHG | HEIGHT: 64 IN | RESPIRATION RATE: 20 BRPM | OXYGEN SATURATION: 95 %

## 2020-01-01 VITALS
BODY MASS INDEX: 38.72 KG/M2 | OXYGEN SATURATION: 92 % | HEIGHT: 64 IN | DIASTOLIC BLOOD PRESSURE: 72 MMHG | HEART RATE: 89 BPM | RESPIRATION RATE: 22 BRPM | SYSTOLIC BLOOD PRESSURE: 112 MMHG | WEIGHT: 226.8 LBS | TEMPERATURE: 99 F

## 2020-01-01 VITALS
DIASTOLIC BLOOD PRESSURE: 80 MMHG | TEMPERATURE: 97.6 F | WEIGHT: 218 LBS | HEIGHT: 64 IN | SYSTOLIC BLOOD PRESSURE: 144 MMHG | BODY MASS INDEX: 37.22 KG/M2

## 2020-01-01 LAB
ABO/RH: NORMAL
ABO/RH: NORMAL
ALBUMIN SERPL-MCNC: 2.8 G/DL (ref 3.5–5.2)
ALBUMIN SERPL-MCNC: 3.2 G/DL (ref 3.5–5.2)
ALP BLD-CCNC: 84 U/L (ref 40–129)
ALP BLD-CCNC: 96 U/L (ref 40–129)
ALT SERPL-CCNC: 15 U/L (ref 0–40)
ALT SERPL-CCNC: 26 U/L (ref 0–40)
AMMONIA: 50.9 UMOL/L (ref 16–60)
ANION GAP SERPL CALCULATED.3IONS-SCNC: 10 MMOL/L (ref 7–16)
ANION GAP SERPL CALCULATED.3IONS-SCNC: 13 MMOL/L (ref 7–16)
ANION GAP SERPL CALCULATED.3IONS-SCNC: 6 MMOL/L (ref 7–16)
ANION GAP SERPL CALCULATED.3IONS-SCNC: 8 MMOL/L (ref 7–16)
ANION GAP SERPL CALCULATED.3IONS-SCNC: 9 MMOL/L (ref 7–16)
ANISOCYTOSIS: ABNORMAL
ANISOCYTOSIS: ABNORMAL
ANTIBODY IDENTIFICATION: NORMAL
ANTIBODY SCREEN: NORMAL
ANTIBODY SCREEN: NORMAL
APTT: 36.2 SEC (ref 24.5–35.1)
AST SERPL-CCNC: 26 U/L (ref 0–39)
AST SERPL-CCNC: 41 U/L (ref 0–39)
B.E.: 9.9 MMOL/L (ref -3–0)
BACTERIA: ABNORMAL /HPF
BACTERIA: NORMAL /HPF
BASOPHILS ABSOLUTE: 0.03 E9/L (ref 0–0.2)
BASOPHILS ABSOLUTE: 0.04 E9/L (ref 0–0.2)
BASOPHILS ABSOLUTE: 0.05 E9/L (ref 0–0.2)
BASOPHILS ABSOLUTE: 0.05 E9/L (ref 0–0.2)
BASOPHILS RELATIVE PERCENT: 0.3 % (ref 0–2)
BASOPHILS RELATIVE PERCENT: 0.4 % (ref 0–2)
BASOPHILS RELATIVE PERCENT: 0.4 % (ref 0–2)
BASOPHILS RELATIVE PERCENT: 0.5 % (ref 0–2)
BILIRUB SERPL-MCNC: 0.7 MG/DL (ref 0–1.2)
BILIRUB SERPL-MCNC: 0.9 MG/DL (ref 0–1.2)
BILIRUBIN URINE: NEGATIVE
BILIRUBIN URINE: NEGATIVE
BLOOD BANK DISPENSE STATUS: NORMAL
BLOOD BANK DISPENSE STATUS: NORMAL
BLOOD BANK PRODUCT CODE: NORMAL
BLOOD BANK PRODUCT CODE: NORMAL
BLOOD CULTURE, ROUTINE: NORMAL
BLOOD, URINE: NORMAL
BLOOD, URINE: NORMAL
BPU ID: NORMAL
BPU ID: NORMAL
BUN BLDV-MCNC: 25 MG/DL (ref 8–23)
BUN BLDV-MCNC: 29 MG/DL (ref 8–23)
BUN BLDV-MCNC: 31 MG/DL (ref 8–23)
BUN BLDV-MCNC: 38 MG/DL (ref 8–23)
BUN BLDV-MCNC: 42 MG/DL (ref 8–23)
CALCIUM SERPL-MCNC: 8.6 MG/DL (ref 8.6–10.2)
CALCIUM SERPL-MCNC: 9.1 MG/DL (ref 8.6–10.2)
CALCIUM SERPL-MCNC: 9.1 MG/DL (ref 8.6–10.2)
CALCIUM SERPL-MCNC: 9.2 MG/DL (ref 8.6–10.2)
CALCIUM SERPL-MCNC: 9.3 MG/DL (ref 8.6–10.2)
CHLORIDE BLD-SCNC: 104 MMOL/L (ref 98–107)
CHLORIDE BLD-SCNC: 105 MMOL/L (ref 98–107)
CHLORIDE BLD-SCNC: 106 MMOL/L (ref 98–107)
CHLORIDE BLD-SCNC: 106 MMOL/L (ref 98–107)
CHLORIDE BLD-SCNC: 107 MMOL/L (ref 98–107)
CLARITY: CLEAR
CLARITY: CLEAR
CO2: 26 MMOL/L (ref 22–29)
CO2: 31 MMOL/L (ref 22–29)
CO2: 33 MMOL/L (ref 22–29)
CO2: 35 MMOL/L (ref 22–29)
CO2: 37 MMOL/L (ref 22–29)
COLOR: YELLOW
COLOR: YELLOW
CREAT SERPL-MCNC: 1.3 MG/DL (ref 0.7–1.2)
CREAT SERPL-MCNC: 1.4 MG/DL (ref 0.7–1.2)
CREAT SERPL-MCNC: 1.4 MG/DL (ref 0.7–1.2)
CREAT SERPL-MCNC: 1.6 MG/DL (ref 0.7–1.2)
CREAT SERPL-MCNC: 1.8 MG/DL (ref 0.7–1.2)
CULTURE, BLOOD 2: NORMAL
DAT POLYSPECIFIC: NORMAL
DAT POLYSPECIFIC: NORMAL
DESCRIPTION BLOOD BANK: NORMAL
DESCRIPTION BLOOD BANK: NORMAL
DEVICE: ABNORMAL
DR. NOTIFY: NORMAL
DR. NOTIFY: NORMAL
EKG ATRIAL RATE: 59 BPM
EKG ATRIAL RATE: 60 BPM
EKG Q-T INTERVAL: 426 MS
EKG Q-T INTERVAL: 438 MS
EKG QRS DURATION: 146 MS
EKG QRS DURATION: 154 MS
EKG QTC CALCULATION (BAZETT): 502 MS
EKG QTC CALCULATION (BAZETT): 512 MS
EKG R AXIS: -66 DEGREES
EKG R AXIS: -76 DEGREES
EKG T AXIS: 41 DEGREES
EKG T AXIS: 58 DEGREES
EKG VENTRICULAR RATE: 79 BPM
EKG VENTRICULAR RATE: 87 BPM
EOSINOPHILS ABSOLUTE: 0.06 E9/L (ref 0.05–0.5)
EOSINOPHILS ABSOLUTE: 0.08 E9/L (ref 0.05–0.5)
EOSINOPHILS ABSOLUTE: 0.08 E9/L (ref 0.05–0.5)
EOSINOPHILS ABSOLUTE: 0.86 E9/L (ref 0.05–0.5)
EOSINOPHILS RELATIVE PERCENT: 0.5 % (ref 0–6)
EOSINOPHILS RELATIVE PERCENT: 0.9 % (ref 0–6)
EOSINOPHILS RELATIVE PERCENT: 0.9 % (ref 0–6)
EOSINOPHILS RELATIVE PERCENT: 8 % (ref 0–6)
FIO2 ARTERIAL: 6
GFR AFRICAN AMERICAN: 43
GFR AFRICAN AMERICAN: 49
GFR AFRICAN AMERICAN: 57
GFR AFRICAN AMERICAN: 57
GFR AFRICAN AMERICAN: >60
GFR NON-AFRICAN AMERICAN: 36 ML/MIN/1.73
GFR NON-AFRICAN AMERICAN: 41 ML/MIN/1.73
GFR NON-AFRICAN AMERICAN: 48 ML/MIN/1.73
GFR NON-AFRICAN AMERICAN: 48 ML/MIN/1.73
GFR NON-AFRICAN AMERICAN: 52 ML/MIN/1.73
GLUCOSE BLD-MCNC: 101 MG/DL (ref 74–99)
GLUCOSE BLD-MCNC: 109 MG/DL (ref 74–99)
GLUCOSE BLD-MCNC: 119 MG/DL (ref 74–99)
GLUCOSE BLD-MCNC: 135 MG/DL (ref 74–99)
GLUCOSE BLD-MCNC: 172 MG/DL (ref 74–99)
GLUCOSE URINE: NEGATIVE MG/DL
GLUCOSE URINE: NEGATIVE MG/DL
HCO3 ARTERIAL: 37.1 MMOL/L (ref 22–26)
HCT VFR BLD CALC: 32.2 % (ref 37–54)
HCT VFR BLD CALC: 32.4 % (ref 37–54)
HCT VFR BLD CALC: 34.3 % (ref 37–54)
HCT VFR BLD CALC: 35.7 % (ref 37–54)
HEMOGLOBIN: 10 G/DL (ref 12.5–16.5)
HEMOGLOBIN: 10.1 G/DL (ref 12.5–16.5)
HEMOGLOBIN: 10.7 G/DL (ref 12.5–16.5)
HEMOGLOBIN: 11.3 G/DL (ref 12.5–16.5)
HYALINE CASTS: ABNORMAL /LPF (ref 0–2)
IMMATURE GRANULOCYTES #: 0.05 E9/L
IMMATURE GRANULOCYTES #: 0.06 E9/L
IMMATURE GRANULOCYTES #: 0.08 E9/L
IMMATURE GRANULOCYTES #: 0.08 E9/L
IMMATURE GRANULOCYTES %: 0.5 % (ref 0–5)
IMMATURE GRANULOCYTES %: 0.6 % (ref 0–5)
IMMATURE GRANULOCYTES %: 0.7 % (ref 0–5)
IMMATURE GRANULOCYTES %: 0.7 % (ref 0–5)
INR BLD: 2.2
INR BLD: 2.3
KETONES, URINE: NEGATIVE MG/DL
KETONES, URINE: NEGATIVE MG/DL
LACTIC ACID, SEPSIS: 1.2 MMOL/L (ref 0.5–1.9)
LACTIC ACID, SEPSIS: 2 MMOL/L (ref 0.5–1.9)
LEUKOCYTE ESTERASE, URINE: NEGATIVE
LEUKOCYTE ESTERASE, URINE: NEGATIVE
LYMPHOCYTES ABSOLUTE: 0.47 E9/L (ref 1.5–4)
LYMPHOCYTES ABSOLUTE: 0.56 E9/L (ref 1.5–4)
LYMPHOCYTES ABSOLUTE: 0.79 E9/L (ref 1.5–4)
LYMPHOCYTES ABSOLUTE: 0.93 E9/L (ref 1.5–4)
LYMPHOCYTES RELATIVE PERCENT: 5.1 % (ref 20–42)
LYMPHOCYTES RELATIVE PERCENT: 6 % (ref 20–42)
LYMPHOCYTES RELATIVE PERCENT: 7 % (ref 20–42)
LYMPHOCYTES RELATIVE PERCENT: 8.7 % (ref 20–42)
MCH RBC QN AUTO: 33.2 PG (ref 26–35)
MCH RBC QN AUTO: 33.2 PG (ref 26–35)
MCH RBC QN AUTO: 33.3 PG (ref 26–35)
MCH RBC QN AUTO: 34.5 PG (ref 26–35)
MCHC RBC AUTO-ENTMCNC: 30.9 % (ref 32–34.5)
MCHC RBC AUTO-ENTMCNC: 31.2 % (ref 32–34.5)
MCHC RBC AUTO-ENTMCNC: 31.4 % (ref 32–34.5)
MCHC RBC AUTO-ENTMCNC: 31.7 % (ref 32–34.5)
MCV RBC AUTO: 105.9 FL (ref 80–99.9)
MCV RBC AUTO: 106.5 FL (ref 80–99.9)
MCV RBC AUTO: 108 FL (ref 80–99.9)
MCV RBC AUTO: 108.8 FL (ref 80–99.9)
METER GLUCOSE: 103 MG/DL (ref 74–99)
METER GLUCOSE: 104 MG/DL (ref 74–99)
METER GLUCOSE: 112 MG/DL (ref 74–99)
METER GLUCOSE: 117 MG/DL (ref 74–99)
METER GLUCOSE: 120 MG/DL (ref 74–99)
METER GLUCOSE: 121 MG/DL (ref 74–99)
METER GLUCOSE: 132 MG/DL (ref 74–99)
MONOCYTES ABSOLUTE: 1.02 E9/L (ref 0.1–0.95)
MONOCYTES ABSOLUTE: 1.13 E9/L (ref 0.1–0.95)
MONOCYTES ABSOLUTE: 1.15 E9/L (ref 0.1–0.95)
MONOCYTES ABSOLUTE: 1.29 E9/L (ref 0.1–0.95)
MONOCYTES RELATIVE PERCENT: 10.5 % (ref 2–12)
MONOCYTES RELATIVE PERCENT: 11 % (ref 2–12)
MONOCYTES RELATIVE PERCENT: 11.4 % (ref 2–12)
MONOCYTES RELATIVE PERCENT: 12.4 % (ref 2–12)
NEUTROPHILS ABSOLUTE: 7.47 E9/L (ref 1.8–7.3)
NEUTROPHILS ABSOLUTE: 7.51 E9/L (ref 1.8–7.3)
NEUTROPHILS ABSOLUTE: 7.69 E9/L (ref 1.8–7.3)
NEUTROPHILS ABSOLUTE: 9.01 E9/L (ref 1.8–7.3)
NEUTROPHILS RELATIVE PERCENT: 71.6 % (ref 43–80)
NEUTROPHILS RELATIVE PERCENT: 80 % (ref 43–80)
NEUTROPHILS RELATIVE PERCENT: 80.7 % (ref 43–80)
NEUTROPHILS RELATIVE PERCENT: 81.2 % (ref 43–80)
NITRITE, URINE: NEGATIVE
NITRITE, URINE: NEGATIVE
O2 SATURATION: 99.8 % (ref 92–98.5)
OPERATOR ID: 3114
OVALOCYTES: ABNORMAL
OVALOCYTES: ABNORMAL
PCO2 ARTERIAL: 64.5 MMHG (ref 35–45)
PDW BLD-RTO: 15 FL (ref 11.5–15)
PDW BLD-RTO: 16.1 FL (ref 11.5–15)
PDW BLD-RTO: 16.1 FL (ref 11.5–15)
PDW BLD-RTO: 16.2 FL (ref 11.5–15)
PH BLOOD GAS: 7.37 (ref 7.35–7.45)
PH UA: 5.5 (ref 5–9)
PH UA: 6 (ref 5–9)
PLATELET # BLD: 179 E9/L (ref 130–450)
PLATELET # BLD: 240 E9/L (ref 130–450)
PLATELET # BLD: 240 E9/L (ref 130–450)
PLATELET # BLD: 258 E9/L (ref 130–450)
PMV BLD AUTO: 10 FL (ref 7–12)
PMV BLD AUTO: 9.6 FL (ref 7–12)
PMV BLD AUTO: 9.8 FL (ref 7–12)
PMV BLD AUTO: 9.8 FL (ref 7–12)
PO2 ARTERIAL: 258.8 MMHG (ref 60–80)
POIKILOCYTES: ABNORMAL
POIKILOCYTES: ABNORMAL
POTASSIUM REFLEX MAGNESIUM: 4 MMOL/L (ref 3.5–5)
POTASSIUM REFLEX MAGNESIUM: 4.1 MMOL/L (ref 3.5–5)
POTASSIUM REFLEX MAGNESIUM: 4.1 MMOL/L (ref 3.5–5)
POTASSIUM REFLEX MAGNESIUM: 5 MMOL/L (ref 3.5–5)
POTASSIUM SERPL-SCNC: 3.8 MMOL/L (ref 3.5–5)
PRO-BNP: 2196 PG/ML (ref 0–450)
PRO-BNP: 3492 PG/ML (ref 0–450)
PROCALCITONIN: 0.12 NG/ML (ref 0–0.08)
PROLACTIN: 25.35 NG/ML
PROTEIN UA: NEGATIVE MG/DL
PROTEIN UA: NEGATIVE MG/DL
PROTHROMBIN TIME: 24.8 SEC (ref 9.3–12.4)
PROTHROMBIN TIME: 27.4 SEC (ref 9.3–12.4)
RBC # BLD: 3 E12/L (ref 3.8–5.8)
RBC # BLD: 3.04 E12/L (ref 3.8–5.8)
RBC # BLD: 3.22 E12/L (ref 3.8–5.8)
RBC # BLD: 3.28 E12/L (ref 3.8–5.8)
RBC UA: ABNORMAL /HPF (ref 0–2)
RBC UA: NORMAL /HPF (ref 0–2)
SARS-COV-2, NAAT: NOT DETECTED
SARS-COV-2, NAAT: NOT DETECTED
SODIUM BLD-SCNC: 142 MMOL/L (ref 132–146)
SODIUM BLD-SCNC: 147 MMOL/L (ref 132–146)
SODIUM BLD-SCNC: 148 MMOL/L (ref 132–146)
SODIUM BLD-SCNC: 149 MMOL/L (ref 132–146)
SODIUM BLD-SCNC: 150 MMOL/L (ref 132–146)
SOURCE, BLOOD GAS: ABNORMAL
SPECIFIC GRAVITY UA: 1.01 (ref 1–1.03)
SPECIFIC GRAVITY UA: 1.02 (ref 1–1.03)
TOTAL PROTEIN: 5.9 G/DL (ref 6.4–8.3)
TOTAL PROTEIN: 6.1 G/DL (ref 6.4–8.3)
TROPONIN: 0.02 NG/ML (ref 0–0.03)
TROPONIN: 0.03 NG/ML (ref 0–0.03)
TROPONIN: 0.05 NG/ML (ref 0–0.03)
URINE CULTURE, ROUTINE: NORMAL
UROBILINOGEN, URINE: 0.2 E.U./DL
UROBILINOGEN, URINE: 0.2 E.U./DL
WBC # BLD: 10.7 E9/L (ref 4.5–11.5)
WBC # BLD: 11.3 E9/L (ref 4.5–11.5)
WBC # BLD: 9.3 E9/L (ref 4.5–11.5)
WBC # BLD: 9.3 E9/L (ref 4.5–11.5)
WBC UA: ABNORMAL /HPF (ref 0–5)
WBC UA: NORMAL /HPF (ref 0–5)

## 2020-01-01 PROCEDURE — 6360000004 HC RX CONTRAST MEDICATION: Performed by: RADIOLOGY

## 2020-01-01 PROCEDURE — APPSS30 APP SPLIT SHARED TIME 16-30 MINUTES: Performed by: NURSE PRACTITIONER

## 2020-01-01 PROCEDURE — 99233 SBSQ HOSP IP/OBS HIGH 50: CPT | Performed by: INTERNAL MEDICINE

## 2020-01-01 PROCEDURE — 86922 COMPATIBILITY TEST ANTIGLOB: CPT

## 2020-01-01 PROCEDURE — 82962 GLUCOSE BLOOD TEST: CPT

## 2020-01-01 PROCEDURE — 70450 CT HEAD/BRAIN W/O DYE: CPT

## 2020-01-01 PROCEDURE — 93005 ELECTROCARDIOGRAM TRACING: CPT | Performed by: STUDENT IN AN ORGANIZED HEALTH CARE EDUCATION/TRAINING PROGRAM

## 2020-01-01 PROCEDURE — 93010 ELECTROCARDIOGRAM REPORT: CPT | Performed by: INTERNAL MEDICINE

## 2020-01-01 PROCEDURE — 2700000000 HC OXYGEN THERAPY PER DAY

## 2020-01-01 PROCEDURE — 1200000000 HC SEMI PRIVATE

## 2020-01-01 PROCEDURE — 2580000003 HC RX 258: Performed by: INTERNAL MEDICINE

## 2020-01-01 PROCEDURE — 99285 EMERGENCY DEPT VISIT HI MDM: CPT

## 2020-01-01 PROCEDURE — 94761 N-INVAS EAR/PLS OXIMETRY MLT: CPT

## 2020-01-01 PROCEDURE — 6370000000 HC RX 637 (ALT 250 FOR IP): Performed by: INTERNAL MEDICINE

## 2020-01-01 PROCEDURE — 87040 BLOOD CULTURE FOR BACTERIA: CPT

## 2020-01-01 PROCEDURE — 6360000002 HC RX W HCPCS: Performed by: STUDENT IN AN ORGANIZED HEALTH CARE EDUCATION/TRAINING PROGRAM

## 2020-01-01 PROCEDURE — 81001 URINALYSIS AUTO W/SCOPE: CPT

## 2020-01-01 PROCEDURE — 96374 THER/PROPH/DIAG INJ IV PUSH: CPT

## 2020-01-01 PROCEDURE — 11721 DEBRIDE NAIL 6 OR MORE: CPT | Performed by: PODIATRIST

## 2020-01-01 PROCEDURE — 80053 COMPREHEN METABOLIC PANEL: CPT

## 2020-01-01 PROCEDURE — 86901 BLOOD TYPING SEROLOGIC RH(D): CPT

## 2020-01-01 PROCEDURE — 80048 BASIC METABOLIC PNL TOTAL CA: CPT

## 2020-01-01 PROCEDURE — 86870 RBC ANTIBODY IDENTIFICATION: CPT

## 2020-01-01 PROCEDURE — 86905 BLOOD TYPING RBC ANTIGENS: CPT

## 2020-01-01 PROCEDURE — 85025 COMPLETE CBC W/AUTO DIFF WBC: CPT

## 2020-01-01 PROCEDURE — 86900 BLOOD TYPING SEROLOGIC ABO: CPT

## 2020-01-01 PROCEDURE — 2580000003 HC RX 258: Performed by: STUDENT IN AN ORGANIZED HEALTH CARE EDUCATION/TRAINING PROGRAM

## 2020-01-01 PROCEDURE — 6360000002 HC RX W HCPCS: Performed by: EMERGENCY MEDICINE

## 2020-01-01 PROCEDURE — 84484 ASSAY OF TROPONIN QUANT: CPT

## 2020-01-01 PROCEDURE — 82803 BLOOD GASES ANY COMBINATION: CPT

## 2020-01-01 PROCEDURE — 74174 CTA ABD&PLVS W/CONTRAST: CPT

## 2020-01-01 PROCEDURE — 6360000002 HC RX W HCPCS: Performed by: INTERNAL MEDICINE

## 2020-01-01 PROCEDURE — 1123F ACP DISCUSS/DSCN MKR DOCD: CPT | Performed by: INTERNAL MEDICINE

## 2020-01-01 PROCEDURE — 94660 CPAP INITIATION&MGMT: CPT

## 2020-01-01 PROCEDURE — 36415 COLL VENOUS BLD VENIPUNCTURE: CPT

## 2020-01-01 PROCEDURE — 83880 ASSAY OF NATRIURETIC PEPTIDE: CPT

## 2020-01-01 PROCEDURE — 86880 COOMBS TEST DIRECT: CPT

## 2020-01-01 PROCEDURE — 71275 CT ANGIOGRAPHY CHEST: CPT

## 2020-01-01 PROCEDURE — 85610 PROTHROMBIN TIME: CPT

## 2020-01-01 PROCEDURE — 84145 PROCALCITONIN (PCT): CPT

## 2020-01-01 PROCEDURE — 86850 RBC ANTIBODY SCREEN: CPT

## 2020-01-01 PROCEDURE — G8417 CALC BMI ABV UP PARAM F/U: HCPCS | Performed by: INTERNAL MEDICINE

## 2020-01-01 PROCEDURE — 83605 ASSAY OF LACTIC ACID: CPT

## 2020-01-01 PROCEDURE — G8427 DOCREV CUR MEDS BY ELIG CLIN: HCPCS | Performed by: INTERNAL MEDICINE

## 2020-01-01 PROCEDURE — 99442 PR PHYS/QHP TELEPHONE EVALUATION 11-20 MIN: CPT | Performed by: INTERNAL MEDICINE

## 2020-01-01 PROCEDURE — 84146 ASSAY OF PROLACTIN: CPT

## 2020-01-01 PROCEDURE — 87088 URINE BACTERIA CULTURE: CPT

## 2020-01-01 PROCEDURE — U0002 COVID-19 LAB TEST NON-CDC: HCPCS

## 2020-01-01 PROCEDURE — 1036F TOBACCO NON-USER: CPT | Performed by: INTERNAL MEDICINE

## 2020-01-01 PROCEDURE — 99223 1ST HOSP IP/OBS HIGH 75: CPT | Performed by: INTERNAL MEDICINE

## 2020-01-01 PROCEDURE — 71045 X-RAY EXAM CHEST 1 VIEW: CPT

## 2020-01-01 PROCEDURE — 2580000003 HC RX 258: Performed by: RADIOLOGY

## 2020-01-01 PROCEDURE — 85730 THROMBOPLASTIN TIME PARTIAL: CPT

## 2020-01-01 PROCEDURE — 2060000000 HC ICU INTERMEDIATE R&B

## 2020-01-01 PROCEDURE — 82140 ASSAY OF AMMONIA: CPT

## 2020-01-01 PROCEDURE — 2580000003 HC RX 258: Performed by: NURSE PRACTITIONER

## 2020-01-01 PROCEDURE — 99239 HOSP IP/OBS DSCHRG MGMT >30: CPT | Performed by: INTERNAL MEDICINE

## 2020-01-01 PROCEDURE — 4040F PNEUMOC VAC/ADMIN/RCVD: CPT | Performed by: INTERNAL MEDICINE

## 2020-01-01 PROCEDURE — 2580000003 HC RX 258: Performed by: EMERGENCY MEDICINE

## 2020-01-01 RX ORDER — FENTANYL CITRATE 50 UG/ML
25 INJECTION, SOLUTION INTRAMUSCULAR; INTRAVENOUS ONCE
Status: COMPLETED | OUTPATIENT
Start: 2020-01-01 | End: 2020-01-01

## 2020-01-01 RX ORDER — ACETAMINOPHEN 325 MG/1
650 TABLET ORAL EVERY 6 HOURS PRN
Status: DISCONTINUED | OUTPATIENT
Start: 2020-01-01 | End: 2020-01-01 | Stop reason: HOSPADM

## 2020-01-01 RX ORDER — CEFDINIR 300 MG/1
300 CAPSULE ORAL 2 TIMES DAILY
Qty: 14 CAPSULE | Refills: 0 | Status: SHIPPED | OUTPATIENT
Start: 2020-01-01 | End: 2020-01-01

## 2020-01-01 RX ORDER — PANTOPRAZOLE SODIUM 40 MG/1
40 TABLET, DELAYED RELEASE ORAL
Status: DISCONTINUED | OUTPATIENT
Start: 2020-01-01 | End: 2020-01-01 | Stop reason: HOSPADM

## 2020-01-01 RX ORDER — SODIUM CHLORIDE 0.9 % (FLUSH) 0.9 %
10 SYRINGE (ML) INJECTION EVERY 12 HOURS SCHEDULED
Status: DISCONTINUED | OUTPATIENT
Start: 2020-01-01 | End: 2020-01-01 | Stop reason: SDUPTHER

## 2020-01-01 RX ORDER — HALOPERIDOL 1 MG/1
TABLET ORAL
Qty: 5 TABLET | Refills: 0 | Status: SHIPPED | OUTPATIENT
Start: 2020-01-01

## 2020-01-01 RX ORDER — NICOTINE POLACRILEX 4 MG
15 LOZENGE BUCCAL PRN
Status: DISCONTINUED | OUTPATIENT
Start: 2020-01-01 | End: 2020-01-01 | Stop reason: HOSPADM

## 2020-01-01 RX ORDER — ALBUTEROL SULFATE 2.5 MG/3ML
2.5 SOLUTION RESPIRATORY (INHALATION) EVERY 6 HOURS PRN
Status: DISCONTINUED | OUTPATIENT
Start: 2020-01-01 | End: 2020-01-01 | Stop reason: HOSPADM

## 2020-01-01 RX ORDER — SODIUM CHLORIDE 0.9 % (FLUSH) 0.9 %
10 SYRINGE (ML) INJECTION EVERY 12 HOURS SCHEDULED
Status: DISCONTINUED | OUTPATIENT
Start: 2020-01-01 | End: 2020-01-01 | Stop reason: HOSPADM

## 2020-01-01 RX ORDER — OLANZAPINE 5 MG/1
TABLET ORAL
Qty: 5 TABLET | Refills: 1 | Status: SHIPPED | OUTPATIENT
Start: 2020-01-01

## 2020-01-01 RX ORDER — ATORVASTATIN CALCIUM 40 MG/1
40 TABLET, FILM COATED ORAL DAILY
Status: DISCONTINUED | OUTPATIENT
Start: 2020-01-01 | End: 2020-01-01 | Stop reason: HOSPADM

## 2020-01-01 RX ORDER — PROMETHAZINE HYDROCHLORIDE 25 MG/1
12.5 TABLET ORAL EVERY 6 HOURS PRN
Status: DISCONTINUED | OUTPATIENT
Start: 2020-01-01 | End: 2020-01-01 | Stop reason: ALTCHOICE

## 2020-01-01 RX ORDER — NITROGLYCERIN 0.4 MG/1
0.4 TABLET SUBLINGUAL EVERY 5 MIN PRN
Status: DISCONTINUED | OUTPATIENT
Start: 2020-01-01 | End: 2020-01-01 | Stop reason: HOSPADM

## 2020-01-01 RX ORDER — SODIUM CHLORIDE 0.9 % (FLUSH) 0.9 %
10 SYRINGE (ML) INJECTION PRN
Status: DISCONTINUED | OUTPATIENT
Start: 2020-01-01 | End: 2020-01-01 | Stop reason: SDUPTHER

## 2020-01-01 RX ORDER — DEXTROSE MONOHYDRATE 50 MG/ML
100 INJECTION, SOLUTION INTRAVENOUS PRN
Status: DISCONTINUED | OUTPATIENT
Start: 2020-01-01 | End: 2020-01-01 | Stop reason: HOSPADM

## 2020-01-01 RX ORDER — LORAZEPAM 1 MG/1
TABLET ORAL
Qty: 5 TABLET | Refills: 0 | Status: SHIPPED | OUTPATIENT
Start: 2020-01-01 | End: 2020-01-01

## 2020-01-01 RX ORDER — PROMETHAZINE HYDROCHLORIDE 25 MG/1
12.5 TABLET ORAL EVERY 6 HOURS PRN
Status: DISCONTINUED | OUTPATIENT
Start: 2020-01-01 | End: 2020-01-01 | Stop reason: HOSPADM

## 2020-01-01 RX ORDER — M-VIT,TX,IRON,MINS/CALC/FOLIC 27MG-0.4MG
1 TABLET ORAL EVERY MORNING
Status: DISCONTINUED | OUTPATIENT
Start: 2020-01-01 | End: 2020-01-01 | Stop reason: HOSPADM

## 2020-01-01 RX ORDER — FINASTERIDE 5 MG/1
5 TABLET, FILM COATED ORAL
Status: DISCONTINUED | OUTPATIENT
Start: 2020-01-01 | End: 2020-01-01 | Stop reason: HOSPADM

## 2020-01-01 RX ORDER — DONEPEZIL HYDROCHLORIDE 10 MG/1
TABLET, FILM COATED ORAL
Qty: 30 TABLET | Refills: 1 | Status: ON HOLD
Start: 2020-01-01 | End: 2020-01-01 | Stop reason: HOSPADM

## 2020-01-01 RX ORDER — ONDANSETRON 2 MG/ML
4 INJECTION INTRAMUSCULAR; INTRAVENOUS EVERY 6 HOURS PRN
Status: DISCONTINUED | OUTPATIENT
Start: 2020-01-01 | End: 2020-01-01 | Stop reason: ALTCHOICE

## 2020-01-01 RX ORDER — DONEPEZIL HYDROCHLORIDE 10 MG/1
10 TABLET, FILM COATED ORAL NIGHTLY
Status: DISCONTINUED | OUTPATIENT
Start: 2020-01-01 | End: 2020-01-01

## 2020-01-01 RX ORDER — SODIUM CHLORIDE 0.9 % (FLUSH) 0.9 %
10 SYRINGE (ML) INJECTION PRN
Status: DISCONTINUED | OUTPATIENT
Start: 2020-01-01 | End: 2020-01-01 | Stop reason: HOSPADM

## 2020-01-01 RX ORDER — METOPROLOL SUCCINATE 25 MG/1
100 TABLET, EXTENDED RELEASE ORAL DAILY
Status: DISCONTINUED | OUTPATIENT
Start: 2020-01-01 | End: 2020-01-01 | Stop reason: HOSPADM

## 2020-01-01 RX ORDER — FENTANYL CITRATE 50 UG/ML
INJECTION, SOLUTION INTRAMUSCULAR; INTRAVENOUS
Status: DISCONTINUED
Start: 2020-01-01 | End: 2020-01-01 | Stop reason: HOSPADM

## 2020-01-01 RX ORDER — TAMSULOSIN HYDROCHLORIDE 0.4 MG/1
0.4 CAPSULE ORAL EVERY EVENING
Status: DISCONTINUED | OUTPATIENT
Start: 2020-01-01 | End: 2020-01-01 | Stop reason: HOSPADM

## 2020-01-01 RX ORDER — ACETAMINOPHEN 650 MG/1
650 SUPPOSITORY RECTAL EVERY 6 HOURS PRN
Status: DISCONTINUED | OUTPATIENT
Start: 2020-01-01 | End: 2020-01-01 | Stop reason: HOSPADM

## 2020-01-01 RX ORDER — ISOSORBIDE MONONITRATE 30 MG/1
60 TABLET, EXTENDED RELEASE ORAL DAILY
Status: DISCONTINUED | OUTPATIENT
Start: 2020-01-01 | End: 2020-01-01 | Stop reason: HOSPADM

## 2020-01-01 RX ORDER — FUROSEMIDE 20 MG/1
40 TABLET ORAL DAILY
Status: DISCONTINUED | OUTPATIENT
Start: 2020-01-01 | End: 2020-01-01 | Stop reason: HOSPADM

## 2020-01-01 RX ORDER — DEXTROSE AND SODIUM CHLORIDE 5; .45 G/100ML; G/100ML
INJECTION, SOLUTION INTRAVENOUS CONTINUOUS
Status: DISCONTINUED | OUTPATIENT
Start: 2020-01-01 | End: 2020-01-01 | Stop reason: HOSPADM

## 2020-01-01 RX ORDER — ACETAMINOPHEN 650 MG/1
SUPPOSITORY RECTAL
Qty: 3 SUPPOSITORY | Refills: 0 | Status: SHIPPED | OUTPATIENT
Start: 2020-01-01

## 2020-01-01 RX ORDER — 0.9 % SODIUM CHLORIDE 0.9 %
250 INTRAVENOUS SOLUTION INTRAVENOUS ONCE
Status: COMPLETED | OUTPATIENT
Start: 2020-01-01 | End: 2020-01-01

## 2020-01-01 RX ORDER — FUROSEMIDE 40 MG/1
40 TABLET ORAL DAILY
Status: DISCONTINUED | OUTPATIENT
Start: 2020-01-01 | End: 2020-01-01

## 2020-01-01 RX ORDER — POLYETHYLENE GLYCOL 3350 17 G/17G
17 POWDER, FOR SOLUTION ORAL DAILY PRN
Status: DISCONTINUED | OUTPATIENT
Start: 2020-01-01 | End: 2020-01-01 | Stop reason: HOSPADM

## 2020-01-01 RX ORDER — ONDANSETRON 2 MG/ML
4 INJECTION INTRAMUSCULAR; INTRAVENOUS EVERY 6 HOURS PRN
Status: DISCONTINUED | OUTPATIENT
Start: 2020-01-01 | End: 2020-01-01 | Stop reason: SDUPTHER

## 2020-01-01 RX ORDER — LANOLIN ALCOHOL/MO/W.PET/CERES
3 CREAM (GRAM) TOPICAL NIGHTLY PRN
Qty: 30 TABLET | Refills: 2 | Status: SHIPPED | OUTPATIENT
Start: 2020-01-01

## 2020-01-01 RX ORDER — METOPROLOL SUCCINATE 100 MG/1
100 TABLET, EXTENDED RELEASE ORAL DAILY
Status: DISCONTINUED | OUTPATIENT
Start: 2020-01-01 | End: 2020-01-01 | Stop reason: HOSPADM

## 2020-01-01 RX ORDER — DONEPEZIL HYDROCHLORIDE 10 MG/1
TABLET, FILM COATED ORAL
Qty: 30 TABLET | Refills: 0 | Status: SHIPPED
Start: 2020-01-01 | End: 2020-01-01 | Stop reason: SDUPTHER

## 2020-01-01 RX ORDER — ATORVASTATIN CALCIUM 40 MG/1
40 TABLET, FILM COATED ORAL NIGHTLY
Status: DISCONTINUED | OUTPATIENT
Start: 2020-01-01 | End: 2020-01-01 | Stop reason: HOSPADM

## 2020-01-01 RX ORDER — FUROSEMIDE 10 MG/ML
20 INJECTION INTRAMUSCULAR; INTRAVENOUS ONCE
Status: COMPLETED | OUTPATIENT
Start: 2020-01-01 | End: 2020-01-01

## 2020-01-01 RX ORDER — FENTANYL CITRATE 50 UG/ML
50 INJECTION, SOLUTION INTRAMUSCULAR; INTRAVENOUS ONCE
Status: COMPLETED | OUTPATIENT
Start: 2020-01-01 | End: 2020-01-01

## 2020-01-01 RX ORDER — ONDANSETRON 2 MG/ML
4 INJECTION INTRAMUSCULAR; INTRAVENOUS EVERY 6 HOURS PRN
Status: DISCONTINUED | OUTPATIENT
Start: 2020-01-01 | End: 2020-01-01 | Stop reason: HOSPADM

## 2020-01-01 RX ORDER — LEVETIRACETAM 500 MG/1
500 TABLET ORAL 2 TIMES DAILY
Qty: 60 TABLET | Refills: 0 | Status: SHIPPED | OUTPATIENT
Start: 2020-01-01

## 2020-01-01 RX ORDER — TROSPIUM CHLORIDE 20 MG/1
20 TABLET, FILM COATED ORAL NIGHTLY
Status: DISCONTINUED | OUTPATIENT
Start: 2020-01-01 | End: 2020-01-01 | Stop reason: HOSPADM

## 2020-01-01 RX ORDER — GLYCOPYRROLATE 2 MG/1
TABLET ORAL
Qty: 5 TABLET | Refills: 0 | Status: SHIPPED | OUTPATIENT
Start: 2020-01-01

## 2020-01-01 RX ORDER — METOPROLOL SUCCINATE 100 MG/1
100 TABLET, EXTENDED RELEASE ORAL DAILY
Qty: 90 TABLET | Refills: 0 | Status: SHIPPED | OUTPATIENT
Start: 2020-01-01

## 2020-01-01 RX ORDER — DEXTROSE MONOHYDRATE 25 G/50ML
12.5 INJECTION, SOLUTION INTRAVENOUS PRN
Status: DISCONTINUED | OUTPATIENT
Start: 2020-01-01 | End: 2020-01-01 | Stop reason: HOSPADM

## 2020-01-01 RX ORDER — FUROSEMIDE 40 MG/1
40 TABLET ORAL DAILY
Qty: 90 TABLET | Refills: 0 | Status: SHIPPED | OUTPATIENT
Start: 2020-01-01

## 2020-01-01 RX ORDER — DONEPEZIL HYDROCHLORIDE 5 MG/1
10 TABLET, FILM COATED ORAL NIGHTLY
Status: DISCONTINUED | OUTPATIENT
Start: 2020-01-01 | End: 2020-01-01 | Stop reason: HOSPADM

## 2020-01-01 RX ORDER — MORPHINE SULFATE 100 MG/5ML
5 SOLUTION ORAL 4 TIMES DAILY PRN
Qty: 30 ML | Refills: 0 | Status: SHIPPED | OUTPATIENT
Start: 2020-01-01 | End: 2020-01-01

## 2020-01-01 RX ORDER — ACETAMINOPHEN 325 MG/1
650 TABLET ORAL EVERY 4 HOURS PRN
Status: DISCONTINUED | OUTPATIENT
Start: 2020-01-01 | End: 2020-01-01 | Stop reason: SDUPTHER

## 2020-01-01 RX ADMIN — DONEPEZIL HYDROCHLORIDE 10 MG: 10 TABLET, FILM COATED ORAL at 21:21

## 2020-01-01 RX ADMIN — IOPAMIDOL 110 ML: 755 INJECTION, SOLUTION INTRAVENOUS at 23:58

## 2020-01-01 RX ADMIN — APIXABAN 5 MG: 5 TABLET, FILM COATED ORAL at 22:37

## 2020-01-01 RX ADMIN — SERTRALINE 50 MG: 50 TABLET, FILM COATED ORAL at 08:52

## 2020-01-01 RX ADMIN — SODIUM CHLORIDE, PRESERVATIVE FREE 10 ML: 5 INJECTION INTRAVENOUS at 08:52

## 2020-01-01 RX ADMIN — FUROSEMIDE 40 MG: 40 TABLET ORAL at 21:21

## 2020-01-01 RX ADMIN — DONEPEZIL HYDROCHLORIDE 10 MG: 10 TABLET, FILM COATED ORAL at 22:37

## 2020-01-01 RX ADMIN — SERTRALINE 50 MG: 50 TABLET, FILM COATED ORAL at 08:41

## 2020-01-01 RX ADMIN — IOPAMIDOL 100 ML: 755 INJECTION, SOLUTION INTRAVENOUS at 13:33

## 2020-01-01 RX ADMIN — SODIUM CHLORIDE, PRESERVATIVE FREE 10 ML: 5 INJECTION INTRAVENOUS at 08:46

## 2020-01-01 RX ADMIN — FENTANYL CITRATE 25 MCG: 50 INJECTION INTRAMUSCULAR; INTRAVENOUS at 05:31

## 2020-01-01 RX ADMIN — METOPROLOL SUCCINATE 100 MG: 100 TABLET, EXTENDED RELEASE ORAL at 08:41

## 2020-01-01 RX ADMIN — METOPROLOL SUCCINATE 100 MG: 100 TABLET, EXTENDED RELEASE ORAL at 10:02

## 2020-01-01 RX ADMIN — APIXABAN 5 MG: 5 TABLET, FILM COATED ORAL at 21:21

## 2020-01-01 RX ADMIN — DEXTROSE AND SODIUM CHLORIDE: 5; 450 INJECTION, SOLUTION INTRAVENOUS at 15:23

## 2020-01-01 RX ADMIN — FENTANYL CITRATE 25 MCG: 50 INJECTION INTRAMUSCULAR; INTRAVENOUS at 00:33

## 2020-01-01 RX ADMIN — ATORVASTATIN CALCIUM 40 MG: 40 TABLET, FILM COATED ORAL at 22:37

## 2020-01-01 RX ADMIN — SERTRALINE 50 MG: 50 TABLET, FILM COATED ORAL at 21:21

## 2020-01-01 RX ADMIN — ANORECTAL OINTMENT: 15.7; .44; 24; 20.6 OINTMENT TOPICAL at 08:48

## 2020-01-01 RX ADMIN — FUROSEMIDE 40 MG: 40 TABLET ORAL at 08:52

## 2020-01-01 RX ADMIN — FUROSEMIDE 20 MG: 10 INJECTION, SOLUTION INTRAMUSCULAR; INTRAVENOUS at 13:03

## 2020-01-01 RX ADMIN — APIXABAN 5 MG: 5 TABLET, FILM COATED ORAL at 08:52

## 2020-01-01 RX ADMIN — SODIUM CHLORIDE 250 ML: 9 INJECTION, SOLUTION INTRAVENOUS at 12:14

## 2020-01-01 RX ADMIN — PANTOPRAZOLE SODIUM 40 MG: 40 TABLET, DELAYED RELEASE ORAL at 06:12

## 2020-01-01 RX ADMIN — LEVETIRACETAM 500 MG: 100 INJECTION, SOLUTION INTRAVENOUS at 15:23

## 2020-01-01 RX ADMIN — SODIUM CHLORIDE 250 ML: 9 INJECTION, SOLUTION INTRAVENOUS at 22:11

## 2020-01-01 RX ADMIN — Medication 10 ML: at 23:58

## 2020-01-01 RX ADMIN — FUROSEMIDE 40 MG: 40 TABLET ORAL at 08:41

## 2020-01-01 RX ADMIN — TROSPIUM CHLORIDE 20 MG: 20 TABLET, FILM COATED ORAL at 21:21

## 2020-01-01 RX ADMIN — APIXABAN 5 MG: 5 TABLET, FILM COATED ORAL at 08:41

## 2020-01-01 RX ADMIN — ANORECTAL OINTMENT: 15.7; .44; 24; 20.6 OINTMENT TOPICAL at 14:20

## 2020-01-01 RX ADMIN — ATORVASTATIN CALCIUM 40 MG: 40 TABLET, FILM COATED ORAL at 21:21

## 2020-01-01 RX ADMIN — METOPROLOL SUCCINATE 100 MG: 100 TABLET, EXTENDED RELEASE ORAL at 21:21

## 2020-01-01 RX ADMIN — ANORECTAL OINTMENT: 15.7; .44; 24; 20.6 OINTMENT TOPICAL at 22:37

## 2020-01-01 RX ADMIN — SODIUM CHLORIDE, PRESERVATIVE FREE 10 ML: 5 INJECTION INTRAVENOUS at 21:22

## 2020-01-01 RX ADMIN — TROSPIUM CHLORIDE 20 MG: 20 TABLET, FILM COATED ORAL at 22:37

## 2020-01-01 ASSESSMENT — ENCOUNTER SYMPTOMS
DIARRHEA: 0
EYE DISCHARGE: 0
GASTROINTESTINAL NEGATIVE: 1
WHEEZING: 0
VOMITING: 0
BACK PAIN: 0
SORE THROAT: 0
SINUS PRESSURE: 0
NAUSEA: 0
EYE REDNESS: 0
EYE PAIN: 0
EYES NEGATIVE: 1
COUGH: 0
ABDOMINAL PAIN: 0
SHORTNESS OF BREATH: 1
RESPIRATORY NEGATIVE: 1

## 2020-01-01 ASSESSMENT — PAIN SCALES - GENERAL
PAINLEVEL_OUTOF10: 0
PAINLEVEL_OUTOF10: 0
PAINLEVEL_OUTOF10: 1
PAINLEVEL_OUTOF10: 9
PAINLEVEL_OUTOF10: 0
PAINLEVEL_OUTOF10: 3
PAINLEVEL_OUTOF10: 2
PAINLEVEL_OUTOF10: 0
PAINLEVEL_OUTOF10: 0

## 2020-01-01 ASSESSMENT — PAIN DESCRIPTION - ORIENTATION: ORIENTATION: LEFT

## 2020-01-01 ASSESSMENT — PAIN DESCRIPTION - PAIN TYPE: TYPE: ACUTE PAIN

## 2020-01-01 ASSESSMENT — PAIN DESCRIPTION - ONSET: ONSET: ON-GOING

## 2020-01-01 ASSESSMENT — PAIN DESCRIPTION - FREQUENCY: FREQUENCY: CONTINUOUS

## 2020-01-01 ASSESSMENT — PAIN DESCRIPTION - DESCRIPTORS: DESCRIPTORS: DULL;ACHING

## 2020-01-01 ASSESSMENT — PAIN DESCRIPTION - LOCATION: LOCATION: CHEST

## 2020-01-01 ASSESSMENT — PAIN DESCRIPTION - PROGRESSION: CLINICAL_PROGRESSION: GRADUALLY IMPROVING

## 2020-02-24 NOTE — CARE COORDINATION
ACM spoke with Jo Courtney, patient's wife, and explained care coordination services. Jo Courtney declined to enroll at this time.

## 2020-02-24 NOTE — PROGRESS NOTES
Negative. Cardiovascular: Negative. Gastrointestinal: Negative. Endocrine: Negative. Genitourinary: Negative. Review of Systems:   History obtained from chart review and the patient  General ROS: negative for - chills, fatigue, fever, night sweats or weight gain  Constitutional: Negative for chills, diaphoresis, fatigue, fever and unexpected weight change. Musculoskeletal: Positive for arthralgias, gait problem and joint swelling. Neurological ROS: negative for - behavioral changes, confusion, headaches or seizures. Negative for weakness and numbness. Dermatological ROS: negative for - mole changes, rash  Cardiovascular: Negative for leg swelling. Gastrointestinal: Negative for constipation, diarrhea, nausea and vomiting. Objective:  General: AAO x 3 in NAD. Derm  Toenail Description nails are thick and mycotic yellow incurvated causing pain with shoe gear  Sites of Onychomycosis Involvement (Check affected area)  [x] [x] [x] [x] [x] [x] [x] [x] [x] [x]  5 4 3 2 1 1 2 3 4 5                          Right                                        Left    Thickness  [x] [x] [x] [x] [x] [x] [x] [x] [x] [x]  5 4 3 2 1 1 2 3 4 5                         Right                                        Left    Dystrophic Changes   [x] [x] [x] [x] [x] [x] [x] [x] [x] [x]  5 4 3 2 1 1 2 3 4 5                         Right                                        Left    Color   [x] [x] [x] [x] [x] [x] [x] [x] [x] [x]  5 4 3 2 1 1 2 3 4 5                          Right                                        Left    Incurvation/Ingrowin   [x] [x] [x] [x] [x] [x] [x] [x] [x] [x]  5 4 3 2 1 1 2 3 4 5                         Right                                        Left    Inflammation/Pain   [x] [x] [x] [x] [x] [x] [x] [x] [x] [x]  5 4 3 2 1 1 2 3 4 5                         Right                                        Left      Dermatologic Exam:  Skin lesion/ulceration no ulcers note.    Skin there are varicose veins noted bilaterally there is loss of hair cool to touch discoloration to feet and skin nails are thick mycotic  Loss of hair  lower extremity      Musculoskeletal:     1st MPJ ROM decreased, Bilateral.  Muscle Bilateral.  Pain present upon palpation of toenails 1-5, Bilateral. decreased medial longitudinal arch, Bilateral.  Ankle ROM decreased,Bilateral.    Dorsally contracted digits     Vascular:   CFT <2 seconds, Bilateral.  Hair growth absent to the level of the digits, Bilateral.  Edema pos Bilateral.  Varicosities severe Bilateral.     Neurological: Sensation diminshed to light touch to level of digits, Bilateral.  Protective sensation  sites via 5.07/10g Gaines-Mayur Monofilament, Bilateral.  negative Tinel's, Bilateral.  negative Valleix sign, Bilateral.      Integument: nails   thickened > 3.0 mm, dystrophic and crumbly, discolored with subungual debris. Toes cool to touch    Visual inspection:  Deformity: hammertoe deformity ingrid feet  amputation: absent    Edema: Noted  Sensory exam:  Monofilament sensation: abnormal - 6/10 via SW 5.07/10g monofilament to the plantar foot bilateral feet    Pulses:     Pinprick: Impaired  Proprioception: Impaired  Vibration (128 Hz):  Impaired       DM with PVD       [x]Yes    []no    Foot Exam    General  General Appearance: appears stated age and healthy   Orientation: alert and oriented to person, place, and time       Right Foot/Ankle     Inspection and Palpation  Tenderness: none   Swelling: plantar fascia   Skin Exam: skin changes, abnormal color and erythema; no cellulitis and no ulcer     Neurovascular  Dorsalis pedis: absent  Posterior tibial: absent  Saphenous nerve sensation: normal  Tibial nerve sensation: normal  Superficial peroneal nerve sensation: normal  Deep peroneal nerve sensation: normal  Sural nerve sensation: normal  Achilles reflex: 1+  Babinski reflex: 1+      Left Foot/Ankle      Inspection and Palpation  Tenderness: none

## 2020-03-02 NOTE — TELEPHONE ENCOUNTER
Last Appointment:  12/17/2019  Future Appointments   Date Time Provider [place]   3/10/2020  2:00 PM Corby Rodriguez  [address]   5/18/2020 11:30 AM Kris Travis DPM Col Podiatry [facility]      Nelson Genao calling he needs A new rx for aricept sent in. Because he has been taking a whole tab not a half tab daily for a month. He has had no issues. Pharmacy will not fill rx on file he is out early due to not taking as ordered. Will you send in aricpet a whole tab daily then discuss at appt?  rx pended

## 2020-03-16 NOTE — TELEPHONE ENCOUNTER
I also spoke w/ wife. She believes that she past whatever she had to [facility] Coil. States that she has been on two different antibiotics. Denies fever and SOB. Has some nasal congestion and his throat is irritated from drainage. If you can call in something they will cancel today's appt. [place].

## 2020-03-16 NOTE — TELEPHONE ENCOUNTER
Requested Prescriptions     Signed Prescriptions Disp Refills    cefdinir (OMNICEF) 300 MG capsule 14 capsule 0     Sig: Take 1 capsule by mouth 2 times daily for 7 days     Authorizing Provider: Emely Dang to pharmacy

## 2020-04-22 NOTE — TELEPHONE ENCOUNTER
Pt's wife returned your call. She wanted me to let you know that she will schedule an appt for him at a later time. They do not feel comfortable bringing him out.

## 2020-04-22 NOTE — PROGRESS NOTES
[facility]  Kofi Herrera. Jocelyn Carreon M.D. Deon Black M.D.  Lon Garcia. Sanaz Delgado M.D. Sudhir Vizcaino M.D. Polly Lincoln M.D. MD Chinyere Zhang Asa, MD      Ramila Miguel is a 80 y.o. male evaluated via telephone on 4/22/2020. The protocol was carried out with the patient at home and Dr. Jocelyn Carreon at the cardiac office on [facility]    Consent:  He and/or health care decision maker is aware that that he may receive a bill for this telephone service, depending on his insurance coverage, and has provided verbal consent to proceed: Yes      Documentation:  I communicated with the patient and/or health care decision maker  . Details of this discussion including any medical advice provided:      I AFFIRM this is a Patient Initiated Episode with an Established Patient who has not had a related appointment within my department in the past 7 days or scheduled within the next 24 hours. He has a history of dilated cardiomyopathy and atrial fibrillation. 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 He was recently switched to a apixaban. He states that he sleeps a lot but is generally felt well. 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 He occasionally wears O2 via nasal cannula but denies orthopnea PND or ankle edema. He is remained indoors due to COVID-19 pandemic. He states his sleeping and eating are fine. Medical History:  1. AF, new onset 03/26/2007. DCCV to sinus mechanism 08/2007 with recurrence by 01/2008.     2. Sleep apnea/COPD/asthma.    3. HTN. 4. Cataracts. 5. Lifelong nonsmoker. 6. Hyperlipidemia.  Total cholesterol 101, triglycerides 165, HDL 41, LDL 27 - 11/17/2010.     7. Chronic RBBB, LAFB. 8. Adenosine stress MPS, 03/27/2007.  Normal perfusion, EF 60%.   9. Echo, 02/07/2008.  EF 60%, no PHTN.  LA 3.5, LVH, RVSP 36.    10. BPH.  11. Back surgery, [facility], for vertebral osteomyelitis, 35/2017.  Course complicated by PE (corpectomy T4-L1 with L1-2 fusion graft performed).   12. IVC filter (per patient).    13. [facility] readmission, 02/2008 for CHF.  Pleural effusions.    14. Five week rehabilitation at [address] [place] [address] for dyspnea.  Recurrent PE excluded, 04/2008.    15. Patient denies diabetes mellitus saying hyperglycemia transient post op. 12. Family history positive for MI.  17. Allergy to morphine, Bactrim DS and Percocet.    18. Chronic anticoagulation with Coumadin.     19. Nephrolithiasis, 1998.    20. Echo, 05/15/2008.  LVE, no LVH, LVEF normal, KERA, mild-moderate TR with moderate PHTN.  RVSP 60 mmHg.  Trivial pericardial fluid.    21. Dobutamine MPS, 05/19/2008.  No chest pain or ST changes from baseline RBBB.  Gated images normal, EF 73%, no TID.  Small to moderate sized area of mild basal lateral ischemia. 22. Back surgery, [facility], 06/2008.    23. DCCV, [facility], 10/17/2008.  Normal sinus mechanism without complications achieved.    24. Prostate laser surgery, Dr. Carolina Other, 11/30/2009.  25. Obesity.  BMI 33.41 - 08/2015. 26. Adenosine MPS, 06/22/2010 with no chest pain or ST changes.  Gated images normal, EF 67%.  Borderline TID.  Moderate sized area of equivocal basal lateral ischemia.    27. Repeat lipid panel on Niaspan 1000 mg.  Total cholesterol 174, triglycerides 283, HDL 33, LDL 84.  28. Echo, 11/22/2010.  LV dilatation.  Mild concentric LVH. No regional wall motion abnormalities.  EF normal.  Stage I diastolic dysfunction.   TDI showed no LA or LVEDP pressure elevation.  Moderate LAE.  Marked HAJA.  Mild RVE.  No hemodynamically significant valvular abnormalities.  RVSP 55.  Aortic root 4.1.    29.  Echo, 01/30/2012.  Markedly dilated LV.  Mild concentric LVH.  LV regional wall motion and systolic function normal.   Tissue Doppler indicates normal LV filling pressures.  LA moderately dilated, marked HAJA.  Mild MR, mild TR.  RVSP 50-55 mmHg.   30. DCCV, 02/07/2012 utilizing AP pads.  Patient received a single 100 watt second shock and converted immediately to sinus rhythm.      31. OP evaluation, 09/13/2012. Teddy Gonzales shows AF with ventricular rate 64, RBBB and left axis deviation.    32. Head CT, 09/03/2013.  Moderate to severe diffuse cerebral atrophy.    33. [facility] ER evaluation after a [place], 06/28/2014.  CBC normal.  Lytes normal.  BUN 22, Cr 1.3.  Pulse 98.  /69.  Probable mechanical fall.    34. Head CT, 05/26/2014.  No acute process.  Mild white matter changes.  Mild volume loss.    35. Labs, 07/2014.  BUN 19, Cr 1.9, GFR 50.  Lytes normal.  36. 24-hour ambulatory monitor, 07/2014.  AF.  Isolated PVCs.  Average rate 67.  Longest RR cycle 2.2 seconds.    37. [facility] admission, 06/06/2015 for atypical chest pain.  Tn negative.     38. Lexiscan MPS, 06/07/2015.   No reversible defects  EF 67%.     39. Echo, 06/07/2015 (Dr. [facility]).  EF normal.  Mild RV, RA and LAE.   Moderate MR.  RVSP 63.  PHTN moderate.    40. CVA prophylaxis with formerly [facility] [address] (Coumadin) 03/2016. 41. Echo 06/07/2015.  Normal EF.  LVH.  RVSP 63.  EF 65%.    42. Hospitalized 08/17/2017 with fatigue, weakness and dyspnea. hx poor oral intake. Had lactic acidosis and acute kidney injury and an elevated digoxin level (dig level 2.3, PRO-BNP= 655 with BUN= 36 and creatinine= 1.7).  He was volume expanded.  Digoxin was stopped. 43. S/p resection of plantar fascitis muscle, right foot, 07/2018. 44. Echocardiogram, 37/07/2513.  Diastolic dysfunction.  Ejection fraction 35-45%.  LV global hypokinesis. LA severely dilated.  Atrial fibrillation.  L atrial pressure elevated.  Moderate mitral regurgitation with central jet.   Mild aortic regurgitation.   RVSP 59   No pericardial effusion.   45. Outpatient cardiac evaluation, 01/04/2019.  Lasix increased with an extra 40 mg on alternate days  46.  Outpatient cardiac evaluation, 02/14/2019. Symptoms improved. Lasix decreased. Toprol-XL increased to 37.5 mg daily, proBNP and BMP checked.    47. Labs, 02/14/2019.  Electrolytes normal. BUN 31 and creatinine 1.5. ProBNP 1150.   48. Outpatient cardiac assessment, 03/08/2019. AF. Rate 92. Toprol-XL increased to 50 every morning. 49. Outpatient cardiac follow-up, 04/18/2019. Worsening dyspnea. Toprol-XL decreased to 25 daily  50. 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 51. Operation>>  Placement of thoracic [place] C-TAG 34 x 200 not covering left subclavian He had extended hospital stay due to respiratory insufficiency requiring ICU return.  Pleural effusion not drained.  Discharge 07/09/2019  52. [address] admission 07/15/2019 for confusion and slurred speech.  INR 1.4 07/12/2019 brain MRI consistent with recent stroke of left occipital and posterior left temporal lobes.  Recommendations to continue current secondary stroke prevention anticoagulation/aspirin  53. Outpatient cardiac assessment, 08/23/2019. Atrial fibrillation 79/min. Lopressor stopped. Toprol-XL 75 daily started. 54. Echo, 08/22/2019. Estimated EF 45%. 55. Phone assessment 04/22/2020: Chronic exertional dyspnea unchanged. Uses O2 as needed and nocturnal CPAP. No chest pain or leg edema.   Remains chronically anticoagulated.         Review of Systems:  Constitutional: negative for fever and chills  Respiratory: negative for cough and hemoptysis  Cardiovascular:   Gastrointestinal: negative for abdominal pain, diarrhea, nausea and vomiting  Genitourinary:negative for dysuria and hematuria  Derm: negative for rash and skin lesion(s)  Neurological: negative for seizures and tremors  Endocrine: negative for diabetic symptoms including polydipsia and polyuria  Musculoskeletal: negative for CTD  Psychiatric: negative for psychosis and major depression    The patient is symptomatically and functionally stable. Chronic dyspnea is unchanged. Medications are reviewed today with the patient and his wife and no changes made. He will have outpatient cardiac follow-up in July.       Total Time: minutes: 11-20 minutes    Note: not billable if this call serves to triage the patient into an appointment for the relevant concern          At completion of today's visit, medications include the following:    Current Outpatient Medications:     metoprolol succinate (TOPROL XL) 100 MG extended release tablet, Take 1 tablet by mouth daily, Disp: 90 tablet, Rfl: 0    furosemide (LASIX) 40 MG tablet, Take 1 tablet by mouth daily, Disp: 90 tablet, Rfl: 0    donepezil (ARICEPT) 10 MG tablet, Take 1 tab nightly, Disp: 30 tablet, Rfl: 0    OXYGEN, Inhale into the lungs, Disp: , Rfl:     tolterodine (DETROL LA) 4 MG extended release capsule, Take 1 capsule by mouth every morning, Disp: 30 capsule, Rfl: 5    sertraline (ZOLOFT) 50 MG tablet, Take 1 tablet by mouth daily, Disp: 30 tablet, Rfl: 5    pantoprazole (PROTONIX) 40 MG tablet, Take 1 tablet by mouth every morning (before breakfast), Disp: 30 tablet, Rfl: 5    apixaban (ELIQUIS) 5 MG TABS tablet, Take 1 tablet by mouth 2 times daily, Disp: 60 tablet, Rfl: 5    atorvastatin (LIPITOR) 40 MG tablet, Take 1 tablet by mouth daily, Disp: 30 tablet, Rfl: 5    isosorbide mononitrate (IMDUR) 60 MG extended release tablet, Take 1 tablet by mouth daily, Disp: 90 tablet, Rfl: 3    albuterol sulfate HFA (PROVENTIL HFA) 108 (90 Base) MCG/ACT inhaler, Inhale 2 puffs into the lungs every 6 hours as needed for Wheezing or Shortness of Breath , Disp: , Rfl:     aspirin 81 MG chewable tablet, Take 81 mg by mouth daily , Disp: , Rfl:     tamsulosin (FLOMAX) 0.4 MG capsule, Take 0.4 mg by mouth every evening, Disp: , Rfl:     magnesium chloride (MAG DELAY) 535 (64 Mg) MG TBCR extended release tablet, Take 64 mg by mouth daily, Disp: , Rfl:     Multiple Vitamins-Minerals (THERAPEUTIC MULTIVITAMIN-MINERALS) tablet, Take 1 tablet by mouth every morning , Disp: , Rfl:     finasteride (PROSCAR) 5 MG tablet, Take 5 mg by mouth Daily with supper , Disp: , Rfl:       Note: This report was completed utilizing computer voice recognition software. Every effort has been made to ensure accuracy, however; inadvertent computerized transcription errors may be present.       --Sandy King MD on 4/22/2020 at 10:01 AM

## 2020-06-13 PROBLEM — E66.9 OBESITY (BMI 30-39.9): Status: ACTIVE | Noted: 2020-01-01

## 2020-06-13 PROBLEM — J98.11 ATELECTASIS OF LEFT LUNG: Status: ACTIVE | Noted: 2020-01-01

## 2020-06-13 PROBLEM — R26.2 AMBULATORY DYSFUNCTION: Status: ACTIVE | Noted: 2020-01-01

## 2020-06-13 PROBLEM — I71.40 AAA (ABDOMINAL AORTIC ANEURYSM): Status: ACTIVE | Noted: 2020-01-01

## 2020-06-13 PROBLEM — I71.00 AORTIC DISSECTION (HCC): Status: ACTIVE | Noted: 2020-01-01

## 2020-06-13 PROBLEM — J96.20 ACUTE ON CHRONIC RESPIRATORY FAILURE (HCC): Status: ACTIVE | Noted: 2017-08-17

## 2020-06-13 PROBLEM — D64.9 ANEMIA: Status: ACTIVE | Noted: 2020-01-01

## 2020-06-13 NOTE — ED NOTES
Dr. Adriana Alvarez at bedside informing patient of plan to transfer to [facility] [place].       Yosvany Sheets RN  06/13/20 [id number]

## 2020-06-13 NOTE — ED NOTES
Received call from Via Mami Yoon at [facility] critical care transport who states that patient will be going to [address] but she does not have a bed assignment yet. She states he will be flying, eta 40 minutes.       Nevaeh Beltrán RN  06/13/20 [id number]

## 2020-06-13 NOTE — ED NOTES
Bed: 23  Expected date:   Expected time:   Means of arrival:   Comments:  [address] Sebastian RN  06/12/20 2103 No

## 2020-06-13 NOTE — ED NOTES
Radiology Procedure Waiver   Name: Louisa Rocha  : 1929  MRN: [id number]    Date:  20    Time: 9:49 PM EDT    Benefits of immediately proceeding with radiology exam(s) without pre-testing outweigh the risks or are not indicated as specified below and therefore the following is/are being waived:    [x] Benefits of immediate radiology exam(s) outweigh any risk. OR    Pre-exam testing is not indicated for the following reason(s):  [] Pregnancy test   [] Patients LMP on-time and regular.   [] Patient had Tubal Ligation or has other Contraception Device. [] Patient  is Menopausal or Premenarcheal.    [] Patient had Full or Partial Hysterectomy. [] Protocol for CT contrast allegry   [] Patient has tolerated well previously   [] Patient does not have a true allergy    [] MRI Questionnaire     [] BUN/Creatinine   [] Patient age w/no hx of renal dysfunction. [] Patient on Dialysis. [] Recent Normal Labs.   Electronically signed by Kanchan Grissom DO on 20 at 9:49 PM EDT               Kanchan Grissom DO  Resident  [phone]

## 2020-06-13 NOTE — ED NOTES
Per Dr. Rishi Castellanos, orders from Dr. Kirill Velasco do not need to be completed because patient is being transferred to [facility] [place] by him.            Mathew Navas, EUSEBIO  06/13/20 [id number] LYUBOV Richards RN  06/13/20 4330

## 2020-06-13 NOTE — PROGRESS NOTES
and then fill out if they so wish. Patient does not have any advanced directive or living will. He does not want to be resuscitated. He has designated his wife as power of . Time Spent on Advanced Planning Documents: 16 minutes    Electronically signed by Ab Blake MD on 2020 at 3:48 AM    NOTE: This report was transcribed using voice recognition software. Every effort was made to ensure accuracy; however, inadvertent computerized transcription errors may be present.

## 2020-06-13 NOTE — ED PROVIDER NOTES
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 Patient states that his pain is improving but he is dyspneic on arrival. EMS initially got a BP of 90 over palp but patient arrived with a BP of 110/76. Patient is still complaining of shortness of breath but his symptoms are improving. Per wife they were told he has a slow leak. Patient was life fleeted to [facility] June for [facility]. Patient states he is experiencing similar symptoms to what he experienced then. Wife is at bedside now. Patient is breathing easier and pressure is coming up. The history is provided by the patient. Chest Pain   Pain location:  Substernal area  Pain quality: aching and shooting    Pain radiates to:  Does not radiate  Pain severity:  Moderate  Onset quality:  Sudden  Duration:  1 hour  Timing:  Constant  Progression:  Improving  Chronicity:  New  Associated symptoms: shortness of breath    Associated symptoms: no abdominal pain, no back pain, no cough, no fever, no headache, no nausea, no vomiting and no weakness         Review of Systems   Constitutional: Negative for chills and fever. HENT: Negative for ear pain, sinus pressure and sore throat. Eyes: Negative for pain, discharge and redness. Respiratory: Positive for shortness of breath. Negative for cough and wheezing. Cardiovascular: Positive for chest pain. Gastrointestinal: Negative for abdominal pain, diarrhea, nausea and vomiting. Genitourinary: Negative for dysuria and frequency. Musculoskeletal: Negative for arthralgias and back pain. Skin: Negative for rash and wound. Neurological: Negative for weakness and headaches. Hematological: Negative for adenopathy. All other systems reviewed and are negative. Physical Exam  Vitals signs and nursing note reviewed.    Constitutional: as of Jun 13 0149   Fri Jun 12, 2020   2218 Small bolus provided for change in BP.     [AL]   Sat Jun 13, 2020   0021 Patient back from CT - requesting pain medication    [AL]   0106 25mcg Fentanyl provided - pain improved. [AL]   0147 No evidence of endoleak on CT but after confirming with the Radiologist there is some sort of fibrotic density/scar tissue evident that they recommend further work up via [employer]. [AL]      ED Course User Index  [AL] Renny Hall, DO       --------------------------------------------- PAST HISTORY ---------------------------------------------  Past Medical History:  has a past medical history of Atrial fibrillation ([address] BPH (benign prostatic hyperplasia), CAD (coronary artery disease), Chronic anticoagulation, Chronic diastolic CHF (congestive heart failure) ([address] CKD (chronic kidney disease) stage 3, GFR 30-59 ml/min (HCC), COPD (chronic obstructive pulmonary disease) ([address] Dementia ([address] Dyspnea, Hyperlipidemia, Hypertension, Nephrolithiasis, Non-insulin dependent type 2 diabetes mellitus ([address] MAX (obstructive sleep apnea), Plantar fasciitis of right foot, RBBB, and Stroke ([address] Past Surgical History:  has a past surgical history that includes back surgery; Prostate surgery (2009); Cardioversion (3/2007, 10/2008, 2/2012); Femur Surgery; ECHO Compl W Dop Color Flow (6/7/2015); fracture surgery; Colonoscopy; pr office/outpt visit,procedure only (Right, 7/27/2018); Foot surgery; Upper gastrointestinal endoscopy (N/A, 12/21/2018); Abdominal aortic aneurysm repair (06/2019); Lithotripsy; Leg Surgery; Cardiac surgery; Wrist surgery (Right); eye surgery; Skin cancer excision; and Tonsillectomy. Social History:  reports that he has never smoked. He has never used smokeless tobacco. He reports that he does not drink alcohol or use drugs. Family History: family history includes Cancer (age of onset: 70) in his mother; Heart Disease in his father;  Other in his sister; Stroke Protime-INR   Result Value Ref Range    Protime 24.8 (H) 9.3 - 12.4 sec    INR 2.2    APTT   Result Value Ref Range    aPTT 36.2 (H) 24.5 - 35.1 sec   Doctor Notification   Result Value Ref Range    DR. NEREYDA ASTORGA    Brain Natriuretic Peptide   Result Value Ref Range    Pro-BNP 2,196 (H) 0 - 450 pg/mL   TYPE AND SCREEN   Result Value Ref Range    ABO/Rh O NEG     Antibody Screen POS    ANTIBODY IDENTIFICATION   Result Value Ref Range    Antibody ID POS    DIRECT ANTIGLOBULIN TEST   Result Value Ref Range    Polyspecific Shanae NEG    PREPARE RBC (CROSSMATCH)   Result Value Ref Range    Product Code Blood Bank [id number]     Description Blood Bank Red Blood Cells, Leuko-reduced     Unit Number [id number]     Dispense Status Blood Bank selected        RADIOLOGY:  CTA CHEST W CONTRAST   Final Result      1. NORMAL CTA OF THE PULMONARY ARTERIES WITH CONTRAST ENHANCEMENT. 2. There is a stent graft from the arch of the aorta extending to the   abdominal aorta which appears to be patent. There is no evidence of   any endoleak identified. 3.. There is consolidated density seen throughout the left thorax   which is causing compression of the left lung. CTA ABDOMEN PELVIS W CONTRAST   Final Result   Stent graft in place extending from the arch of the aorta   to the mid abdominal aorta which appears to be patent for aneurysmal   repair with no evidence of any endovascular leak      Distally the abdominal aorta and both iliac and both common femoral   artery has occasional calcified plaque with no evidence of any   stenosis or aneurysmal dilatation. EKG:  This EKG is signed and interpreted by me.     Rate: 79  Rhythm: Atrial fibrillation  Interpretation: non-specific EKG  Comparison: was normal and no previous EKG available      ------------------------- NURSING NOTES AND VITALS REVIEWED ---------------------------  Date / Time Roomed:  6/12/2020  9:03 PM  ED Bed Assignment:  23/23    The

## 2020-06-13 NOTE — H&P
finasteride (PROSCAR) 5 MG tablet Take 5 mg by mouth Daily with supper     Historical Provider, MD       Allergies:  Bactrim [sulfamethoxazole-trimethoprim]; Morphine; and Percocet [oxycodone-acetaminophen]    Social History:      The patient currently lives at home. TOBACCO:   reports that he has never smoked. He has never used smokeless tobacco.  ETOH:   reports no history of alcohol use. Family History:     Reviewed in detail Positive as follows:        Problem Relation Age of Onset    Cancer Mother 70    Heart Disease Father     Stroke Sister     Other Sister         Brain aneurysm       REVIEW OF SYSTEMS:   Pertinent positives as noted in the HPI. All other systems reviewed and negative. PHYSICAL EXAM:    /84   Pulse 75   Temp 97 °F (36.1 °C) (Infrared)   Resp 25   Ht 5' 4\" (1.626 m)   Wt 223 lb (101.2 kg)   SpO2 97%   BMI 38.28 kg/m²     General appearance: Elderly male, ill-appearing and weak, moderate respiratory and painful distress, appears stated age and cooperative. Afebrile. HEENT:  Normal cephalic, atraumatic without obvious deformity. Pupils equal, round, and reactive to light. Extra ocular muscles intact. Conjunctivae/corneas clear. Neck: Supple, with full range of motion. No jugular venous distention. Trachea midline. Respiratory: Increased work of breathing with conversational dyspnea. Diminished air movement otherwise clear to auscultation, bilaterally without Rales/Wheezes/Rhonchi. Cardiovascular:  Regular rate and rhythm with normal S1/S2 without murmurs, rubs or gallops. Abdomen: Soft, non-tender, non-distended with normal bowel sounds. Musculoskeletal:  No clubbing/cyanosis, 2+ edema bilaterally. Full range of motion without deformity. Skin: Skin color, texture, turgor normal.  No rashes or lesions.   Neurologic:  Cranial nerves: II-XII intact, grossly non-focal.  Psychiatric:  Alert and oriented, thought content appropriate, normal insight  Capillary endovascular aneurysm repair [facility]) [T82.330A] 06/13/2020    Obesity (BMI 30-39. 9) [E66.9] 06/13/2020    Ambulatory dysfunction [R26.2] 06/13/2020    Debility [R53.81] 07/12/2019    Acute exacerbation of chronic low back pain [M54.5, G89.29] 05/15/2019    Stage 3 chronic kidney disease (HCC) [N18.3]     Dementia ([address] [F03.90]     CAD (coronary artery disease) [I25.10]     COPD (chronic obstructive pulmonary disease) (HCC) [J44.9]     Type 2 diabetes mellitus (HCC) [E11.9]     Chronic combined systolic and diastolic congestive heart failure (HCC) [I50.42]     BPH (benign prostatic hyperplasia) [N40.0]     Essential hypertension [I10]     MAX (obstructive sleep apnea) [G47.33]     Chest pain [R07.9] 06/06/2015   . Atrial fibrillation on chronic anticoagulation. . hemothorax    PLAN:  #1. 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 Vascular surgery consult. Patient is currently being considered for transfer to [facility]. Closely monitor vitals. Pain control and blood pressure control. Hold anticoagulation and aspirin. #2.  Left sided hemothorax with compressive atelectasis following aortic dissection. Pulmonary consult if patient still here. Hold anticoagulation. #3.  Acute on chronic respiratory failure secondary to above. Oxygen to keep sat above 92%. Monitor blood gases. CPAP as needed. #4.  Acute on chronic low back pain secondary to aortic dissection. Pain control. #5.  Atrial fibrillation currently rate controlled, continue rate control agents, hold anticoagulation. #6.  Chronic combined systolic and diastolic CHF, on diuretics. #7.  Anemia currently stable, monitor hemoglobin closely and transfuse as needed. #8. Hypertension, blood pressures controlled. Continue metoprolol. #9. Chest pain secondary to above. Trend cardiac enzymes. #10. Stage III chronic kidney disease at baseline  #11. Dementia  #12. BPH, continue current medications  #13.  Sleep apnea on CPAP  #14. Type 2 diabetes not on any medications, sliding scale coverage, monitor blood sugar. #15. Coronary artery disease. #16. History of AAA status post endovascular repair. #17. COPD, no acute exacerbation, breathing treatment as needed. #18. Debility with ambulatory dysfunction. #19. Obesity  #20. Patient is critically ill, he has elected to be a DNR at this point, palliative care consult to guide family in medical decision making. Planning is been made to get him to [facility]. DVT Prophylaxis: SCDs, coagulopathy with INR of 2.0  Diet: No diet orders on file n.p.o. Code Status: Prior DNR CCA    PT/OT Eval Status: As needed    Dispo -inpatient/ICU. Akiko Vargas MD    Thank you Zainab Barlow MD for the opportunity to be involved in this patient's care.

## 2020-07-01 PROBLEM — R41.82 AMS (ALTERED MENTAL STATUS): Status: ACTIVE | Noted: 2020-01-01

## 2020-07-01 NOTE — ED NOTES
Difficultly obtaining accurate oxygen saturation and blood pressure due to patient tremors. ABG's drawn and sent to lab. Patient currently on 5L O2 via NC as brought in by EMS. Attempting to obtain accurate oxygen saturation via pediatric oxygen probe to finger, earlobe and forehead. Systolic blood pressure verified via manual BP cuff and doppler US. Will continue assess vital signs.       Mj Hartmann RN  07/01/20 9477

## 2020-07-01 NOTE — ED NOTES
Oxygen decreased to 2L NC per verbal orders from Dr. Stayc Webster based on arterial gases. Will continue to monitor patient.       Candelario Tran RN  07/01/20 9579

## 2020-07-01 NOTE — CARE COORDINATION
Social Work/Transition of Care:    Pt is from [facility], Per [employer] facility will hold bed. Assigned SW/CM to follow.     Electronically signed by Jani Yang on 9/0/2117 at 5:07 PM

## 2020-07-01 NOTE — ED NOTES
4W called to verify successful fax of SBAR. Patient ready to be transported to room 415.       Omaira Rush RN  07/01/20 7324

## 2020-07-01 NOTE — PROGRESS NOTES
Dr. Julita Suazo spoke with Dr. Maine Pena, waiting on CT scan to make a decision as far as transfer.

## 2020-07-01 NOTE — H&P
[facility] History and Physical      CHIEF COMPLAINT:  AMS, sob, sent in from [facility]    History of Present Illness:     Patient is a 79 yo male patient who follows with PCP Dr. Cheryle Freedman with a past medical history of chronic respiratory failure on 3-4 L NC, afib on chronic anticoagulation, CAD, thoracic aortic aneurysm rupture with TEVAR 2019,  CHF, CKD, dementia, HTN, ,HLD, DM, MAX, and h/o CVA 7/2019. Patient recently discharged from [facility] 2 weeks ago to SNF following ruptured thoracic aortic aneurysm s/p TEVAR and endostent. Patient had a prior TEVAR in 2019. Please note patient is a poor historian due to confusion/ hard of hearing. Assistance from wife at bedside. Patient presented to ER from [facility] [place] where he has been the last 2 weeks following surgery at [facility]. Per wife at bedside- she received call this am- that patient was confused and being sent to ER for evaluation. Symptoms moderate, ongoing, and progressive. Per wife patient did have sob 12 hours after arrival to [facility] and has been on Levaquin being treated for suspected pneumonia-last day of antibiotic was to be today. Patient awake, alert, resting in bed in ER in no acute distress. Currently denies complaints. Some confusion noted. Denies sob, chest pain, fevers, chills, nausea, vomiting, dysuria, hematuria, hematochezia. Patient had a chest tube at [facility]  due to hemothorax from TAA. Eliquis was placed on hold at that time with plans to resume in 1 week. He was discharged to SNF on heparin subq. Reporting he has been wearing CPAP at [facility]. Per ER nurse patient mentation has improved significantly since arrival to ER. Initially he was very confused and \" staring. \" Noted to have bilateral tremors which stopped when he was talking- also improved.         Informant(s) for H&P:patient, chart review     REVIEW OF SYSTEMS:  A comprehensive review of systems was negative except for: what is in the HPI      PMH:  Past Medical History:   Diagnosis Date    Aortic aneurysm ([facility]     Atrial fibrillation (HCC)     sees Dr. Minh Adamson BPH (benign prostatic hyperplasia)     CAD (coronary artery disease)     Chronic anticoagulation     Chronic diastolic CHF (congestive heart failure) (HCC)     CKD (chronic kidney disease) stage 3, GFR 30-59 ml/min (HCC)     COPD (chronic obstructive pulmonary disease) (HCC)     STABLE, SOB WITH EXERTION    Dementia ([facility]     Dyspnea     Hyperlipidemia     Hypertension     Nephrolithiasis     Non-insulin dependent type 2 diabetes mellitus (HCC)     no medications     MAX (obstructive sleep apnea)     USES C PAP    Plantar fasciitis of right foot     for OR 7-27-18     RBBB     Stroke [facility]) 07/2019       Surgical History:  Past Surgical History:   Procedure Laterality Date    ABDOMINAL AORTIC ANEURYSM REPAIR  06/2019    BACK SURGERY      LUM LAMI, CORPECTOMY, T4-L1, L1-L2    CARDIAC SURGERY      CARDIOVERSION  3/2007, 10/2008, 2/2012    COLONOSCOPY      ECHO COMPL W DOP COLOR FLOW  6/7/2015         EYE SURGERY      CATARACTS with IOL    FEMUR SURGERY      right     FOOT SURGERY      FRACTURE SURGERY      right wrist     LEG SURGERY      LITHOTRIPSY      TN OFFICE/OUTPT VISIT,PROCEDURE ONLY Right 7/27/2018    RIGHT FOOT PLANTAR FASCIITIS performed by Sherry Irizarry DPM at [facility].  2009    LASER    SKIN CANCER EXCISION      TONSILLECTOMY      UPPER GASTROINTESTINAL ENDOSCOPY N/A 12/21/2018    EGD BIOPSY performed by Silverio Osman MD at [address] roof       Medications Prior to Admission:    Prior to Admission medications    Medication Sig Start Date End Date Taking?  Authorizing Provider   donepezil (ARICEPT) 10 MG tablet Take 1 tab nightly 5/6/20   Arben Damon MD   metoprolol succinate (TOPROL XL) 100 MG extended release tablet Take 1 tablet by mouth daily 4/8/20   Leidy Khalil DO   furosemide (LASIX) 40 MG tablet Take 1 tablet by mouth daily 4/8/20   Melantarah Ford, DO   OXYGEN Inhale into the lungs    Historical Provider, MD   tolterodine (DETROL LA) 4 MG extended release capsule Take 1 capsule by mouth every morning 12/17/19   Severiano Mcfarland MD   sertraline (ZOLOFT) 50 MG tablet Take 1 tablet by mouth daily 12/17/19   Severiano Mcfarland MD   pantoprazole (PROTONIX) 40 MG tablet Take 1 tablet by mouth every morning (before breakfast) 12/17/19   Severiano Mcfarland MD   apixaban Gennett Bars) 5 MG TABS tablet Take 1 tablet by mouth 2 times daily 12/17/19   Severiano Mcfarland MD   atorvastatin (LIPITOR) 40 MG tablet Take 1 tablet by mouth daily 12/17/19   Severiano Mcfarland MD   isosorbide mononitrate (IMDUR) 60 MG extended release tablet Take 1 tablet by mouth daily 11/18/19   Chencho King,    albuterol sulfate HFA (PROVENTIL HFA) 108 (90 Base) MCG/ACT inhaler Inhale 2 puffs into the lungs every 6 hours as needed for Wheezing or Shortness of Breath  7/8/19   Historical Provider, MD   aspirin 81 MG chewable tablet Take 81 mg by mouth daily  7/9/19   Historical Provider, MD   tamsulosin (FLOMAX) 0.4 MG capsule Take 0.4 mg by mouth every evening    Historical Provider, MD   magnesium chloride (MAG DELAY) 535 (64 Mg) MG TBCR extended release tablet Take 64 mg by mouth daily    Historical Provider, MD   Multiple Vitamins-Minerals (THERAPEUTIC MULTIVITAMIN-MINERALS) tablet Take 1 tablet by mouth every morning     Historical Provider, MD   finasteride (PROSCAR) 5 MG tablet Take 5 mg by mouth Daily with supper     Historical Provider, MD       Allergies:    Bactrim [sulfamethoxazole-trimethoprim]; Morphine; and Percocet [oxycodone-acetaminophen]    Social History:    reports that he has never smoked. He has never used smokeless tobacco. He reports that he does not drink alcohol or use drugs. Family History:   family history includes Cancer (age of onset: 70) in his mother; Heart Disease in his father;  Other in his sister; Stroke in his sister. PHYSICAL EXAM:  Vitals:  /70   Pulse 116   Temp 97.8 °F (36.6 °C) (Oral)   Resp 26   Ht 5' 4\" (1.626 m)   Wt 223 lb (101.2 kg)   SpO2 95%   BMI 38.28 kg/m²   General Appearance: alert and oriented to person and place. Confused to month- stated \" June\" [address]. Answering questions appropriately. Skin: warm and dry- left groin ecchymosis  Head: normocephalic and atraumatic  Eyes: pupils equal, round, and reactive to light, extraocular eye movements intact, conjunctivae normal  Neck: neck supple and non tender without mass   Pulmonary/Chest: diminished throughout with rales   Cardiovascular: normal rate, normal S1 and S2 and no carotid bruits  Abdomen: soft, non-tender, non-distended, normal bowel soundsy  Extremities: no cyanosis, no clubbing and lower ext edema 1,   Neurologic: speech normal         LABS:  Recent Labs     07/01/20  1159   *   K 4.1      CO2 37*   BUN 29*   CREATININE 1.3*   GLUCOSE 101*   CALCIUM 9.2       Recent Labs     07/01/20  1159   WBC 9.3   RBC 3.00*   HGB 10.0*   HCT 32.4*   .0*   MCH 33.3   MCHC 30.9*   RDW 16.1*      MPV 9.8       No results for input(s): POCGLU in the last 72 hours. Radiology:   CT Head WO Contrast   Final Result         1. No acute intracranial abnormality. 2. Chronic infarction in the left temporal occipital region. CTA CHEST W CONTRAST   Final Result      1. No pulmonary embolism. 2. Placement of additional stent along the aortic arch since   6/12/2020. No evidence of endoleak. 3. 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 The   remainder of the left subclavian artery is patent, likely due to   retrograde flow through the vertebral artery. Carotid Doppler may be   obtained to assess for the direction of flow in the vertebral   arteries. 4. Moderate size loculated fluid in the left pleural space, decreased   as of 6/13/2020.       CTA ABDOMEN PELVIS W CONTRAST Final Result         1. Tortuous abdominal aorta. No evidence of intramural hematoma,   aneurysm, dissection or rupture. 2. Major branches of the aorta are grossly patent. 3. Complex fluid in the left inguinal region likely represents   hematoma from recent intravascular access. No evidence of active   extravasation. 4. Severe colonic diverticulosis without diverticulitis. 5. Trabeculated urinary bladder with small diverticuli. XR CHEST PORTABLE   Final Result      1. Pulmonary opacities in the left lung likely represent a combination   of pleural fluid with overlying compressive atelectasis and/or   consolidation. 2. Cardiomegaly. Tortuous aneurysmal aorta with a stent posterior arch   and descending thoracic aorta. ASSESSMENT:      Active Problems:    AMS (altered mental status)  Resolved Problems:    * No resolved hospital problems. *      PLAN:    1. Acute encephalopathy: patient sent in from [facility] for altered mental status/ confusion. Noted per nursing to be \" staring off into space. \" Per wife patient talking about \" coffee pot on wall. \" + bilateral tremors- which stops when he is talking. Differentials could include infection vs hypercarbia vs CVA. CT head with no acute findings- showing old infarction. COVID 19 neg. Recently treated for pneumonia. Mentation now much improved in ER. Discussed with attending. No neuro coverage here- ok to stay at [facility]. Will obtain MRI brain. Follow cultures. 2. Acute on chronic respiratory failure with hypercarbia: pt wears 3-4 L at baseline. ABgs reviewed. Co2 65. Per patient he has been wearing CPAP at SNF. Pulmonology consulted. 3. Loculated pleural effusion left lung:CTA reviewed- showing improvement since 6/13. S/p CT at [facility]. Received IV lasix in ER. Pulmonology consulted. 4. S/p TAA TEVAR at [facility]: ER discussed case with [facility] vascular surgery and reported no difference in treatment or need for surgical inervention.      5. Recent pneumonia: tx with levaquin at SNF. Check procal    6. CHF; pro bnp noted. Possible exacerbation. Trial of lasix. Continue meds     7. Afib: on chronic anticoagulation: was held for 1 week following surgery. Per [facility]- plan to resume eliquis 1 week after surgery. Continue meds. Ok to resume eliquis    8. H/o CVA July 2019    9. DM: check hg a1c. Insulin ss    10. HTN; BP 80s on admission. Continue meds. Monitor BP    11 CKD: creatinine 1.3- appears at baseline. Monitor     12. MAX: CPAP    Code Status: DNRCCA  DVT prophylaxis: eliquis      NOTE: This report was transcribed using voice recognition software. Every effort was made to ensure accuracy; however, inadvertent computerized transcription errors may be present. Electronically signed by MARIPOSA Montoya on 7/1/2020 at 4:31 PM     Addendum: I have personally participated in the history, exam, medical decision making with Abdulaziz Dale NP on the date of service, I have personally reviewed imaging and lab data as well as EKG and I agree with all of the pertinent clinical information unless otherwise noted. I have also reviewed and agree with the past medical, family, and social history unless otherwise noted. PHYSICAL EXAM:  Vitals:  /70   Pulse 116   Temp 97.8 °F (36.6 °C) (Oral)   Resp 26   Ht 5' 4\" (1.626 m)   Wt 223 lb (101.2 kg)   SpO2 95%   BMI 38.28 kg/m²     Gen: NAD, AAOx3, sitting in bed, hard of hearing  Neuro: No focal neuro deficit, CN II-XII grossly intact; b/l intention tremor  HEENT: NCAT  CV: Irregularly irregular, systolic murmur  Resp: Equal chest rise b/l, decreased breath sounds at b/l bases; saturating 95% on 2L N/C  Abd: Soft, NT, ND; L groin hematoma with wound that is c/d/i  Ext: Good ROM of b/l upper and lower extremities, 1+ BLE edema    Impression:  Active Problems:    AMS (altered mental status)  Resolved Problems:    * No resolved hospital problems. *      My findings/plan include:     The patient presented with acute encephalopathy, but currently he is AAOx3. CT head did not reveal any acute changes. The patient on exam has no focal neuro deficits. He does have a history of an old stroke. We will get a brain MRI. The patient is status post TEVAR 2 weeks ago for a thoracic aortic aneurysm. CTA of the chest does not reveal any endoleak but does show a moderate sized loculated fluid in the left pleural space which is decreased from previous. The patient is currently afebrile with no leukocytosis. He was discharged to [facility] and was currently being treated for pneumonia. We will get a procalcitonin. Pulmonology consulted from the ED. History of atrial fibrillation, continue home Eliquis and metoprolol. History of CHF, continue home Lasix. History of dementia, continue home donepezil. History of depression, continue home sertraline. Monitor hemodynamics. NOTE: This report was transcribed using voice recognition software. Every effort was made to ensure accuracy; however, inadvertent computerized transcription errors may be present.   Electronically signed by Rose Infante MD on 7/1/2020 at 5:39 PM

## 2020-07-01 NOTE — PROGRESS NOTES
Dr. Erich Lawson requested for patient to be transferred to [facility] the [address] @ [phone]. Patient Diagnosis: Possible Leak Aortic Stent.

## 2020-07-01 NOTE — ED PROVIDER NOTES
Attending; patient examined by ER resident and myself. Patient patient presents for shortness of breath and altered mental status. Patient had recent aortic dissection treated [facility] clinic with endo-stent. Patient on chest x-ray has left pleural effusion. Patient had CT of chest abdomen spoke to patient's vascular surgeon in [facility], Dr. Siddharth Laureano he advises that there is noes further surgical options regarding this patient. He indicated that although they will be glad to see the patient no significant difference in treatment is available in [facility] at this point. Spoke to hospitalist covering for Dr. Monika Brand patient will be admitted and consultation will be placed to patient's pulmonologist regarding the residual pleural effusion/blood and the patient will be admitted to intermediate bed CT of head was negative for acute bleed. . Now I spoke to his vascular surgeon and he said they have nothing more to offer him in an

## 2020-07-01 NOTE — ED NOTES
Patient resting comfortably; on cardiac monitor. Family at bedside. Patient continues to have intermittent tremors at this time but less frequent and less severe than upon arrival to ED. Family states patient has no history of tremors and has been intermittently confused in the past week.       Batsheva Mejía RN  07/01/20 1603

## 2020-07-01 NOTE — ED PROVIDER NOTES
of motion. Right lower leg: Edema present. Left lower leg: Edema present. Skin:     General: Skin is warm and dry. Findings: No rash. Neurological:      Mental Status: He is oriented to person, place, and time. Comments: arousable to verbal stimuli. Moves all 4 extremities. Resting tremor in all 4 extremities when he closes eyes. Procedures     MDM     ED Course as of Jul 01 1646   Wed Jul 01, 2020   1237 Pro-BNP(!): 3,492 [WL]   1237 Ammonia: 50.9 [WL]   9845 IMPRESSION:     1. Pulmonary opacities in the left lung likely represent a combination  of pleural fluid with overlying compressive atelectasis and/or  consolidation. 2. Cardiomegaly. Tortuous aneurysmal aorta with a stent posterior arch  and descending thoracic aorta. XR CHEST PORTABLE [WL]   1532 1. No acute intracranial abnormality. 2. Chronic infarction in the left temporal occipital region. CT Head WO Contrast [WL]   5 Was made to the [facility] clinic, they stated that they do not think the patient should be transferred due to them not being able to provide any further care if he were transferred. [WL]   9867 SARS-CoV-2, NAAT: Not Detected [WL]   5129 EKG read by me. Rate 87 beats minute. Atrial fibrillation. Left axis deviation. Right bundle branch block. No STEMI. Prior to previous EKG from 6/16/2020, no significant changes have occurred. [WL]   [address] with Dr Michael Blackman, he agrees to admit the patient.    [WL]   [id number] Patient presents to the ED for evaluation. Work-up was performed with concerns for but not limited to endoleak, acute CHF, COVID, pneumonia. Patient was found to have pleural effusion but after speaking [facility] the [facility] he states this was present from the previous endoleak letter prepared. The vascular surgeon stated there is no further surgical intervention to perform and patient could be admitted here for his care.   Patient's altered mental status remains. His BNP is elevated and was given Lasix to help diurese the fluid. Patient requires continued workup and management of their symptoms and will be admitted to the hospital for further evaluation and treatment.        [WL]   26 Spoke with Dr. Tierra Ponce, she recommends holding his blood thinning medications for her to possibly perform a tap tomorrow.    [WL]      ED Course User Index  [WL] Artemio Mini, DO            --------------------------------------------- PAST HISTORY ---------------------------------------------  Past Medical History:  has a past medical history of Aortic aneurysm ([address] Atrial fibrillation ([address] BPH (benign prostatic hyperplasia), CAD (coronary artery disease), Chronic anticoagulation, Chronic diastolic CHF (congestive heart failure) ([address] CKD (chronic kidney disease) stage 3, GFR 30-59 ml/min ([facility]), COPD (chronic obstructive pulmonary disease) ([address] Dementia ([address] Dyspnea, Hyperlipidemia, Hypertension, Nephrolithiasis, Non-insulin dependent type 2 diabetes mellitus ([address] MAX (obstructive sleep apnea), Plantar fasciitis of right foot, RBBB, and Stroke ([address] Past Surgical History:  has a past surgical history that includes back surgery; Prostate surgery (2009); Cardioversion (3/2007, 10/2008, 2/2012); Femur Surgery; ECHO Compl W Dop Color Flow (6/7/2015); fracture surgery; Colonoscopy; pr office/outpt visit,procedure only (Right, 7/27/2018); Foot surgery; Upper gastrointestinal endoscopy (N/A, 12/21/2018); Abdominal aortic aneurysm repair (06/2019); Lithotripsy; Leg Surgery; Cardiac surgery; Wrist surgery (Right); eye surgery; Skin cancer excision; and Tonsillectomy. Social History:  reports that he has never smoked. He has never used smokeless tobacco. He reports that he does not drink alcohol or use drugs. Family History: family history includes Cancer (age of onset: 70) in his mother; Heart Disease in his father;  Other in his sister; Stroke in his sister. The patients home medications have been reviewed. Allergies: Bactrim [sulfamethoxazole-trimethoprim];  Morphine; and Percocet [oxycodone-acetaminophen]    -------------------------------------------------- RESULTS -------------------------------------------------    LABS:  Results for orders placed or performed during the hospital encounter of 07/01/20   Lactate, Sepsis   Result Value Ref Range    Lactic Acid, Sepsis 1.2 0.5 - 1.9 mmol/L   CBC Auto Differential   Result Value Ref Range    WBC 9.3 4.5 - 11.5 E9/L    RBC 3.00 (L) 3.80 - 5.80 E12/L    Hemoglobin 10.0 (L) 12.5 - 16.5 g/dL    Hematocrit 32.4 (L) 37.0 - 54.0 %    .0 (H) 80.0 - 99.9 fL    MCH 33.3 26.0 - 35.0 pg    MCHC 30.9 (L) 32.0 - 34.5 %    RDW 16.1 (H) 11.5 - 15.0 fL    Platelets 545 916 - 556 E9/L    MPV 9.8 7.0 - 12.0 fL    Neutrophils % 80.7 (H) 43.0 - 80.0 %    Immature Granulocytes % 0.6 0.0 - 5.0 %    Lymphocytes % 5.1 (L) 20.0 - 42.0 %    Monocytes % 12.4 (H) 2.0 - 12.0 %    Eosinophils % 0.9 0.0 - 6.0 %    Basophils % 0.3 0.0 - 2.0 %    Neutrophils Absolute 7.47 (H) 1.80 - 7.30 E9/L    Immature Granulocytes # 0.06 E9/L    Lymphocytes Absolute 0.47 (L) 1.50 - 4.00 E9/L    Monocytes Absolute 1.15 (H) 0.10 - 0.95 E9/L    Eosinophils Absolute 0.08 0.05 - 0.50 E9/L    Basophils Absolute 0.03 0.00 - 0.20 E9/L    Anisocytosis 1+     Poikilocytes 1+     Ovalocytes 1+    Protime-INR   Result Value Ref Range    Protime 27.4 (H) 9.3 - 12.4 sec    INR 2.3    Troponin   Result Value Ref Range    Troponin 0.03 0.00 - 0.03 ng/mL   Urinalysis   Result Value Ref Range    Color, UA Yellow Straw/Yellow    Clarity, UA Clear Clear    Glucose, Ur Negative Negative mg/dL    Bilirubin Urine Negative Negative    Ketones, Urine Negative Negative mg/dL    Specific Gravity, UA 1.020 1.005 - 1.030    Blood, Urine TRACE-INTACT Negative    pH, UA 5.5 5.0 - 9.0    Protein, UA Negative Negative mg/dL    Urobilinogen, Urine 0.2 <2.0 E.U./dL    Nitrite, 1208 122/72 -- -- -- -- -- -- --   07/01/20 1141 87/72 -- -- 86 20 100 % 5' 4\" (1.626 m) 223 lb (101.2 kg)           Counseling:  I have spoken with the patient/family members and discussed todays results, in addition to providing specific details for the plan of care and counseling regarding the diagnosis and prognosis. Their questions are answered at this time and they are agreeable with the plan of admission. This patient has remained hemodynamically stable during their ED course. Diagnosis:  1. Altered mental status, unspecified altered mental status type    2. Pleural effusion    3. Elevated brain natriuretic peptide (BNP) level        Disposition:  Patient's disposition: Admit  Patient's condition is serious. NOTE:  This report was transcribed using voice recognition software. Efforts were made to ensure accuracy; however, inadvertent computerized transcription errors may be present.                  Jesse Wiseman DO  Resident  07/01/20 4564

## 2020-07-01 NOTE — ED NOTES
Patient presenting to ED by EMS from [facility] for CC: AMS and \"tremors. \" Upon arrival to ED patient is A&Ox3 but disoriented to situation. Patient intermittently having moderate to severe tremors to all extremities; when stimulating patient tremors stop. Patient does not lose consciousness at any time during tremors. Dr. Carlee Stern at bedside evaluating patient at this time.       Ventura Mercer RN  07/01/20 0787

## 2020-07-01 NOTE — ED NOTES
Bed: 28  Expected date:   Expected time:   Means of arrival:   Comments:     Esau Cook RN  07/01/20 1113

## 2020-07-02 NOTE — PROGRESS NOTES
The conditions that should be met to insure pt safety for this patient's implant cannot be met completely with the use of the MRI scanner in [facility]. Discussed with Dr Ambika Morales she suggested doing a CT instead.

## 2020-07-02 NOTE — PROGRESS NOTES
Dr. Bia Willis updated on inability to safely complete MRI due to patient's GORE TAG thoracic endoprosthesis.

## 2020-07-02 NOTE — DISCHARGE INSTR - COC
Continuity of Care Form    Patient Name: Jonathan Lea   :  1929  MRN:  [id number]    Admit date:  2020  Discharge date:  ***    Code Status Order: DNR-CCA   Advance Directives:   Advance Care Flowsheet Documentation     Date/Time Healthcare Directive Type of Healthcare Directive Copy in [address] Agent's Name Healthcare Agent's Phone Number    20 1838  No, patient does not have an advance directive for healthcare treatment -- -- -- -- --          Admitting Physician:  Janee Serrano DO  PCP: Selvin Rubalcava MD    Discharging Nurse: [facility] Unit/Room#: 3831/8479-W  Discharging Unit Phone Number: ***    Emergency Contact:   Extended Emergency Contact Information  Primary Emergency Contact: Christina Morris  Address: [address], [address] Phone: [phone]  Relation: Spouse  Secondary Emergency Contact: Adalberto Lubin [address] Phone: [phone]  Mobile Phone: [phone]  Relation: Child    Past Surgical History:  Past Surgical History:   Procedure Laterality Date    ABDOMEN SURGERY      ABDOMINAL AORTIC ANEURYSM REPAIR  2019    BACK SURGERY      LUM LAMI, CORPECTOMY, T4-L1, L1-L2    CARDIAC SURGERY      CARDIOVERSION  3/2007, 10/2008, 2012    COLONOSCOPY      ECHO COMPL W DOP COLOR FLOW  2015         EYE SURGERY      CATARACTS with IOL    FEMUR SURGERY      right     FOOT SURGERY      FRACTURE SURGERY      right wrist     LEG SURGERY      LITHOTRIPSY      IA OFFICE/OUTPT VISIT,PROCEDURE ONLY Right 2018    RIGHT FOOT PLANTAR FASCIITIS performed by Libertad Dickson DPM at [address] [place] [address]  2009    LASER    SKIN CANCER EXCISION      TONSILLECTOMY      UPPER GASTROINTESTINAL ENDOSCOPY N/A 2018    EGD BIOPSY performed by Lilo Bunn MD at [place] [address] Right     Joni Gonsalves off roof       Immunization History: Immunization History   Administered Date(s) Administered    Influenza Vaccine, unspecified formulation 09/14/2009, 10/22/2010, 08/31/2011, 09/04/2012, 10/14/2013, 09/12/2014, 09/15/2015, 09/26/2016, 09/29/2017    Influenza Virus Vaccine 10/05/2015    Influenza, High Dose (Fluzone 65 yrs and older) 09/30/2016, 09/29/2017, 09/26/2018    Influenza, Triv, inactivated, subunit, adjuvanted, IM (Fluad 65 yrs and older) 09/25/2019    Pneumococcal Conjugate 13-valent (Uhnakel56) 11/11/2016    Pneumococcal Polysaccharide (Llhvlkdyt16) 11/16/2016, 12/01/2017       Active Problems:  Patient Active Problem List   Diagnosis Code    Atrial fibrillation ([facility] I48.91    Chest pain R07.9    Mixed hyperlipidemia E78.2    Acute on chronic respiratory failure (HCC) J96.20    BPH (benign prostatic hyperplasia) N40.0    Chronic combined systolic and diastolic congestive heart failure ([facility]) I50.42    Type 2 diabetes mellitus ([facility]) E11.9    Essential hypertension I10    MAX (obstructive sleep apnea) G47.33    CAD (coronary artery disease) I25.10    Chronic anticoagulation Z79.01    COPD (chronic obstructive pulmonary disease) ([facility]) J44.9    Dementia ([facility]) F03.90    Stage 3 chronic kidney disease ([facility]) N18.3    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    Other vitamin B12 deficiency anemias D51.8    Mild episode of recurrent major depressive disorder ([facility]) F33.0    Other gastritis with bleeding K29.61    Localized edema R60.0    Hypomagnesemia E83.42    Cerumen impaction H61.20    Acute exacerbation of chronic low back pain M54.5, G89.29    Iron deficiency anemia secondary to inadequate dietary iron intake D50.8    Red blood cell antibody positive R76.8    Thoracic aortic aneurysm, ruptured ([facility]) I71.1    Hypoxia R09.02    Debility R53.81    Acute CVA (cerebrovascular accident) ([facility] I63.9    Aortic dissection ([facility]) I71.00    Atelectasis of left lung J98.11    Anemia D64.9    AAA (abdominal aortic aneurysm) RJXU:857788222}    Routes of Feeding: {CHP DME Other Feedings:581309470}  Liquids: {Slp liquid thickness:20781}  Daily Fluid Restriction: {CHP DME Yes amt example:108446520}  Last Modified Barium Swallow with Video (Video Swallowing Test): {Done Not Done LYIP:250712742}    Treatments at the Time of Hospital Discharge:   Respiratory Treatments: ***  Oxygen Therapy:  {Therapy; copd oxygen:23099}  Ventilator:    {MH CC Vent GJHF:156829855}    Rehab Therapies: {THERAPEUTIC INTERVENTION:9735008308}  Weight Bearing Status/Restrictions: 50Nikko Lerma CC Weight Bearin}  Other Medical Equipment (for information only, NOT a DME order):  {EQUIPMENT:879658184}  Other Treatments: ***    Patient's personal belongings (please select all that are sent with patient):  {CHP DME Belongings:205122528}    RN SIGNATURE:  {Esignature:091570910}    CASE MANAGEMENT/SOCIAL WORK SECTION    Inpatient Status Date: ***    Readmission Risk Assessment Score:  Readmission Risk              Risk of Unplanned Readmission:        23           Discharging to Facility/ Agency   · Name: [facility]ery[facility] Coppersmith. · [address] [facility] [address]  · Phone:[phone]  · LST:[phone]    Dialysis Facility (if applicable)   · Name:  · Address:  · Dialysis Schedule:  · Phone:  · Fax:    / signature: Electronically signed by CESAR Mendoza on 2020 at 10:32 AM      PHYSICIAN SECTION    Prognosis: {Prognosis:0505843010}    Condition at Discharge: [address] Lerma Patient Condition:477245211}    Rehab Potential (if transferring to Rehab): {Prognosis:8170116660}    Recommended Labs or Other Treatments After Discharge: ***    Physician Certification: I certify the above information and transfer of Marlin Cabral  is necessary for the continuing treatment of the diagnosis listed and that he requires skilled snf for {LESS:131798892} 30 days.      Update Admission H&P: {CHP DME Changes in RWXUX:038422721}    PHYSICIAN SIGNATURE: {[facility]:806494302}

## 2020-07-02 NOTE — PROGRESS NOTES
Date: 7/1/2020    Time: 10:26 PM    Patient Placed On BIPAP/CPAP/ Non-Invasive Ventilation? Yes    If no must comment. Facial area red/color change? No           If YES are Blister/Lesion present? No   If yes must notify nursing staff  BIPAP/CPAP skin barrier?   Yes    Skin barrier type:duoderm       Comments:        Pat Alfred

## 2020-07-02 NOTE — PROGRESS NOTES
Wound / ostomy consulted for this Pt admitted with AMS. History includes: Aortic dissection, A-fib, CHF and DM. The Pt has IAD with epidermal tissue loss. Heels are intact. Appetite poor. He is on a low air loss mattress. Recommend to discontinue mepilex and apply Calmoseptine topically. SOS precautions are in place.

## 2020-07-02 NOTE — CONSULTS
 CKD (chronic kidney disease) stage 3, GFR 30-59 ml/min (HCC)     COPD (chronic obstructive pulmonary disease) (HCC)     STABLE, SOB WITH EXERTION    Dementia (HCC)     Dyspnea     History of blood transfusion     Hx of blood clots     Hyperlipidemia     Hypertension     Nephrolithiasis     Non-insulin dependent type 2 diabetes mellitus (HCC)     no medications     MAX (obstructive sleep apnea)     USES C PAP    Plantar fasciitis of right foot     for OR 7-27-18     RBBB     Stroke [facility]) 07/2019       Past Surgical History:   Procedure Laterality Date    ABDOMEN SURGERY      ABDOMINAL AORTIC ANEURYSM REPAIR  06/2019    BACK SURGERY      LUM LAMI, CORPECTOMY, T4-L1, L1-L2    CARDIAC SURGERY      CARDIOVERSION  3/2007, 10/2008, 2/2012    COLONOSCOPY      ECHO COMPL W DOP COLOR FLOW  6/7/2015         EYE SURGERY      CATARACTS with IOL    FEMUR SURGERY      right     FOOT SURGERY      FRACTURE SURGERY      right wrist     LEG SURGERY      LITHOTRIPSY      DE OFFICE/OUTPT VISIT,PROCEDURE ONLY Right 7/27/2018    RIGHT FOOT PLANTAR FASCIITIS performed by Dolores Breaux DPM at [facility].  2009    LASER    SKIN CANCER EXCISION      TONSILLECTOMY      UPPER GASTROINTESTINAL ENDOSCOPY N/A 12/21/2018    EGD BIOPSY performed by Latosha Lane MD at [address] [address] Right     Bobby Heckler off roof       Family History   Problem Relation Age of Onset    Cancer Mother 70    Heart Disease Father     Stroke Sister     Other Sister         Brain aneurysm       Social History:   Social History     Socioeconomic History    Marital status:      Spouse name: Not on file    Number of children: Not on file    Years of education: Not on file    Highest education level: Not on file   Occupational History    Occupation: retired-    Social Needs    Financial resource strain: Not on file    Food insecurity     Worry: Not on file     Inability: Not on file  Transportation needs     Medical: Not on file     Non-medical: Not on file   Tobacco Use    Smoking status: Never Smoker    Smokeless tobacco: Never Used   Substance and Sexual Activity    Alcohol use: No     Alcohol/week: 0.0 standard drinks    Drug use: No    Sexual activity: Never   Lifestyle    Physical activity     Days per week: Not on file     Minutes per session: Not on file    Stress: Not on file   Relationships    Social connections     Talks on phone: Not on file     Gets together: Not on file     Attends Alevism service: Not on file     Active member of club or organization: Not on file     Attends meetings of clubs or organizations: Not on file     Relationship status: Not on file    Intimate partner violence     Fear of current or ex partner: Not on file     Emotionally abused: Not on file     Physically abused: Not on file     Forced sexual activity: Not on file   Other Topics Concern    Not on file   Social History Narrative    Not on file     Smoking history: The patient is a Never smoker. ETOH:   reports no history of alcohol use.         Vaccines:    }  Immunization History   Administered Date(s) Administered    Influenza Vaccine, unspecified formulation 09/14/2009, 10/22/2010, 08/31/2011, 09/04/2012, 10/14/2013, 09/12/2014, 09/15/2015, 09/26/2016, 09/29/2017    Influenza Virus Vaccine 10/05/2015    Influenza, High Dose (Fluzone 65 yrs and older) 09/30/2016, 09/29/2017, 09/26/2018    Influenza, Triv, inactivated, subunit, adjuvanted, IM (Fluad 65 yrs and older) 09/25/2019    Pneumococcal Conjugate 13-valent ([id number]) 11/11/2016    Pneumococcal Polysaccharide (Ozhspbpkt46) 11/16/2016, 12/01/2017        Home Meds: Medications Prior to Admission: donepezil (ARICEPT) 10 MG tablet, Take 1 tab nightly  metoprolol succinate (TOPROL XL) 100 MG extended release tablet, Take 1 tablet by mouth daily  furosemide (LASIX) 40 MG tablet, Take 1 tablet by mouth daily  OXYGEN, Inhale 93%   BMI 37.61 kg/m²   SpO2 Readings from Last 1 Encounters:   07/02/20 93%        I/O:  No intake or output data in the 24 hours ending 07/02/20 0838  Vent Information  SpO2: 93 %       IPAP: 12 cmH20  CPAP/EPAP: 6 cmH2O     Physical Exam:  General: The patient is lying in bed comfortably without any distress. Breathing is not labored  HEENT: Pupils are equal round and reactive to light, there are no oral lesions and no post-nasal drip   Neck: supple without adenopathy  Cardiovascular: regular rate and rhythm without murmur or gallop  Respiratory: Clear to auscultation bilaterally without wheezing or crackles. Air entry is symmetric  Abdomen: soft, non-tender, non-distended, normal bowel sounds  Extremities: warm, no edema, no clubbing  Skin: Has significant bruising and ecchymosis both upper arms. Neurologic: CN II-XII grossly intact, no focal deficits    Pulmonary Function Testing personally reviewed and interpreted      Imaging personally reviewed:            Labs:  Lab Results   Component Value Date    WBC 9.3 07/02/2020    HGB 10.1 07/02/2020    HCT 32.2 07/02/2020    .9 07/02/2020    MCH 33.2 07/02/2020    MCHC 31.4 07/02/2020    RDW 16.1 07/02/2020     07/02/2020    MPV 9.6 07/02/2020     Lab Results   Component Value Date     07/02/2020    K 4.0 07/02/2020     07/02/2020    CO2 35 07/02/2020    BUN 31 07/02/2020    CREATININE 1.4 07/02/2020    CREATININE 1.2 09/26/2018    LABALBU 2.8 07/01/2020    CALCIUM 9.1 07/02/2020    GFRAA 57 07/02/2020    LABGLOM 48 07/02/2020     Lab Results   Component Value Date    PROTIME 27.4 07/01/2020    PROTIME 17.9 09/11/2019    INR 2.3 07/01/2020     Recent Labs     07/01/20  1159   PROBNP 3,492*     Recent Labs     07/01/20  1159 07/02/20  0450   TROPONINI 0.03 0.05*     Recent Labs     07/01/20  1159   PROCAL 0.12*     This SmartLink has not been configured with any valid records.        Micro:  No results for input(s): CULTRESP in the last 72 hours. No results for input(s): LABGRAM in the last 72 hours. No results for input(s): LEGUR in the last 72 hours. No results for input(s): STREPNEUMAGU in the last 72 hours. No results for input(s): LP1UAG in the last 72 hours. Assessment:  1. Chronic left loculated pleural effusion  2. Chronic  hypoxic respiratory failure he wears 3 to 4 L at baseline   3. 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 Bilateral CFA percutaneous access    Arch aortogram Hallie Poli with [employer] CTAG with active control 37 x 200 4. Left CFA cutdown   Primary repair left CFA arteriotomy at [facility]. With a large left hemithorax  Chest tube drainage at that time. .  At this point no further surgical intervention can be done per [facility] vascular surgeon. 4.  Chronic A. Fib.  5.  History of stroke July 2019. Plan:  1. The loculated effusion is decreased in size comparison to his last CT scan on 613. Given the complex history of his surgery and the fact that he had a large hemothorax with a chest tube placed at that time and the fact that collated effusion currently is smaller and improved and compare to his previous CT scan in June e I would not recommend any further intervention. 2. Patient is on requirements are at his baseline. 3.   Peers his mental status is back to baseline as well. And is stable from a pulmonary standpoint to be discharged back to his SNF. Thank you for allowing me to participate in the care of Jt Parody. Please feel free to call with questions.          Electronically signed by Jamila Chandra MD on 7/2/2020 at 8:38 AM

## 2020-07-02 NOTE — TELEPHONE ENCOUNTER
Son Sri Marquis would like to have Dr Herbert Garza call him about his father, currently in hospital..   Please call Sri Marquis at [phone]

## 2020-07-02 NOTE — PROGRESS NOTES
[address] Hospitalist   Progress Note    Admitting Date and Time: 7/1/2020 11:16 AM  Admit Dx: AMS (altered mental status) [R41.82]    Subjective:    Pt feels much improved today. Patient remains alert and oriented x3. Wife at bedside concerned with change in mental status. States\" is just not the same\"     Per RN: No new concerns noted. ROS: denies fever, chills, cp, sob, n/v, HA unless stated above.      sodium chloride flush  10 mL Intravenous 2 times per day    insulin lispro  0-6 Units Subcutaneous TID WC    insulin lispro  0-3 Units Subcutaneous Nightly    menthol-zinc oxide   Topical BID    donepezil  10 mg Oral Nightly    metoprolol succinate  100 mg Oral Daily    furosemide  40 mg Oral Daily    trospium  20 mg Oral Nightly    sertraline  50 mg Oral Daily    pantoprazole  40 mg Oral QAM AC    apixaban  5 mg Oral BID    atorvastatin  40 mg Oral Nightly     sodium chloride flush, 10 mL, PRN  acetaminophen, 650 mg, Q6H PRN    Or  acetaminophen, 650 mg, Q6H PRN  polyethylene glycol, 17 g, Daily PRN  promethazine, 12.5 mg, Q6H PRN    Or  ondansetron, 4 mg, Q6H PRN  glucose, 15 g, PRN  dextrose, 12.5 g, PRN  glucagon (rDNA), 1 mg, PRN  dextrose, 100 mL/hr, PRN         Objective:    BP (!) 124/91   Pulse 92   Temp 98.1 °F (36.7 °C) (Oral)   Resp 18   Ht 5' 4\" (1.626 m)   Wt 219 lb 1.6 oz (99.4 kg)   SpO2 92%   BMI 37.61 kg/m²   General Appearance: alert and oriented to person, place and time and in no acute distress  Skin: warm and dry  Head: normocephalic and atraumatic  Eyes: pupils equal, round, and reactive to light, extraocular eye movements intact, conjunctivae normal  Neck: neck supple and non tender without mass   Pulmonary/Chest: Diminished to auscultation bilaterally- no wheezes, rales or rhonchi, normal air movement, no respiratory distress  Cardiovascular: normal rate, normal S1 and S2 no rubs, gallops, murmur noted  Abdomen: soft, non-tender, non-distended, normal bowel sounds, no masses or organomegaly. No rebound, guarding, rigidity noted. Extremities: no cyanosis, no clubbing 1-2+ edema BLE  Neurologic: no cranial nerve deficit and speech normal      Recent Labs     07/01/20  1159 07/02/20  0450   * 148*   K 4.1 4.0    105   CO2 37* 35*   BUN 29* 31*   CREATININE 1.3* 1.4*   GLUCOSE 101* 119*   CALCIUM 9.2 9.1       Recent Labs     07/01/20  1159   ALKPHOS 96   PROT 6.1*   LABALBU 2.8*   BILITOT 0.9   AST 41*   ALT 26       Recent Labs     07/01/20  1159 07/02/20  0450   WBC 9.3 9.3   RBC 3.00* 3.04*   HGB 10.0* 10.1*   HCT 32.4* 32.2*   .0* 105.9*   MCH 33.3 33.2   MCHC 30.9* 31.4*   RDW 16.1* 16.1*    240   MPV 9.8 9.6         Radiology:   CT Head WO Contrast   Final Result         1. No acute intracranial abnormality. 2. Chronic infarction in the left temporal occipital region. CTA CHEST W CONTRAST   Final Result      1. No pulmonary embolism. 2. Placement of additional stent along the aortic arch since   6/12/2020. No evidence of endoleak. 3. 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 The   remainder of the left subclavian artery is patent, likely due to   retrograde flow through the vertebral artery. Carotid Doppler may be   obtained to assess for the direction of flow in the vertebral   arteries. 4. Moderate size loculated fluid in the left pleural space, decreased   as of 6/13/2020. CTA ABDOMEN PELVIS W CONTRAST   Final Result         1. Tortuous abdominal aorta. No evidence of intramural hematoma,   aneurysm, dissection or rupture. 2. Major branches of the aorta are grossly patent. 3. Complex fluid in the left inguinal region likely represents   hematoma from recent intravascular access. No evidence of active   extravasation. 4. Severe colonic diverticulosis without diverticulitis. 5. Trabeculated urinary bladder with small diverticuli. XR CHEST PORTABLE   Final Result      1. family, and social history unless otherwise noted. PHYSICAL EXAM:  Vitals:  BP (!) 124/91   Pulse 92   Temp 98.1 °F (36.7 °C) (Oral)   Resp 18   Ht 5' 4\" (1.626 m)   Wt 219 lb 1.6 oz (99.4 kg)   SpO2 92%   BMI 37.61 kg/m²     Gen: NAD, AAOx3, sitting in bed eating lunch  Neuro: No focal neuro deficits, CN II-XII grossly intact  HEENT: NCAT  CV: Irregularly irregular, systolic murmur  Resp: Equal chest rise b/l, decreased breath sounds at b/l bases; saturating 93% on 3L N/C  Abd: Soft, NT, ND  Ext: Good ROM of b/l upper and lower extremities with 4/5 strength throughout, 2+ BLE edema    Impression:  Active Problems:    AMS (altered mental status)  Resolved Problems:    * No resolved hospital problems. *      My findings/plan include:    Acute encephalopathy resolved. The patient's wife still feels that the patient is a bit \"off\"; however, she has not seen the patient for a while as he has been at [facility] rehab s/p TEVAR. The patient remains AAOx3. The patient's TEVAR graft is not compatible with our MRI to r/o new CVA. However, I do not suspect CVA at this time as the patient has no focal neuro deficits, is able to move all extremities with equal strength, and remains AAOx3. CT of the head did not reveal any acute change or bleed. No need to pursue a brain MRI at this time as this would not likely change our management. Continue home statin and Eliquis. The patient remains afebrile with no leukocytosis. Left pleural space fluid is decreased from previous and procalcitonin is 0.12. I do not suspect any pneumonia, pulmonology following. History of atrial fibrillation, continue home Eliquis and metoprolol. History of CHF, continue home Lasix. History of DM2, continue insulin sliding scale. Monitor blood sugars. History of dementia, continue home donepezil. History of depression, continue home sertraline.   The patient has been started on a low sodium carb controlled diet and will benefit

## 2020-07-02 NOTE — TELEPHONE ENCOUNTER
Call returned to the son    Updated as best as able    Discussed differential diagnosis and possible causes    To notify if needed anything else or if concerns

## 2020-07-03 NOTE — DISCHARGE SUMMARY
[facility] Physician Discharge Summary       Alvaro Murphy MD  [address] [address]  [phone]            Activity level: Bedrest.    Dispo: Transfer to [facility]. Condition on discharge: Stable. Patient ID:  Derek Srinivasan  [id number]  88 y.o.  7/16/1929    Admit date: 7/1/2020    Discharge date and time:  7/3/2020  6:53 PM    Admission Diagnoses: Active Problems:    AMS (altered mental status)  Resolved Problems:    * No resolved hospital problems. *      Discharge Diagnoses: Active Problems:    AMS (altered mental status)  Resolved Problems:    * No resolved hospital problems. *      Consults:  IP CONSULT TO PULMONOLOGY  IP CONSULT TO [facility] Course:   Patient Derek Srinivasan is a 80 y.o. presented with AMS (altered mental status) [R41.82]    The patient presented to the hospital from [facility] for AMS and tremors. The patient recently underwent TEVAR for a thoracic aortic aneurysm at the [facility]. The patient had a PMH of CHF, CKD, DM2, and CVA. In the ED, the patient was AAOx3. Head CT did not reveal any acute changes. CTA of the chest did not reveal any evidence of an endoleak, and also showed a decreased left pleural space loculated effusion. Abdominal CTA revealed a left inguinal hematoma. Pulmonology was consulted from the ED. The patient was admitted for further evaluation. A procalcitonin was ordered and was normal. Brain MRI was ordered but could not be done as the MRI machine was not compatible with the patient's TEVAR graft. The patient remained AAOx3 but did have an intention tremor. The patient also had an JJ on CKD with hypernatremia and was started on D5 1/2 NS. The patient also began having myoclonic jerks mostly from the RUE as well as tardive dyskinesia. The patient did not seem to have any postictal state and was AAOx3 immediately following the myoclonus.  The patient's donepezil was stopped and the patient was VENTRICLES:No hydrocephalus. CALVARIUM:The calvarium is intact without fracture or focal lesion. SINUSES:The visualized paranasal sinuses and mastoids are clear. 1. No acute intracranial abnormality. 2. Chronic infarction in the left temporal occipital region. Xr Chest Portable    Result Date: 2020  Patient MRN: [id number] : 1929 Age:  80 years Gender: Male Order Date: 2020 12:00 PM Exam: XR CHEST PORTABLE Indication:   altered mental status altered mental status Comparison: Chest one view, 2019 . CTA chest, 2020. FINDINGS: The heart is enlarged. The thoracic aorta is tortuous and, as noted on the recent CT, aneurysmal with a treated dissection. A stent is seen in posterior aortic arch and descending thoracic aorta. Opacities are seen in the left mid thorax, more prominently along the base, likely representing a combination of pleural fluid and pulmonary atelectasis/consolidation. The right lung is clear. No pneumothorax is seen. The visualized bones are grossly intact. Surgical hardware related to thoracolumbar fusion are noted. 1. Pulmonary opacities in the left lung likely represent a combination of pleural fluid with overlying compressive atelectasis and/or consolidation. 2. Cardiomegaly. Tortuous aneurysmal aorta with a stent posterior arch and descending thoracic aorta. Cta Chest W Contrast    Result Date: 2020  Patient MRN:  [id number] : 1929 Age: 80 years Gender: Male Order Date:  2020 1:15 PM EXAM: CTA CHEST W CONTRAST INDICATION:  PE concern PE concern COMPARISON: CT angiography of the chest and abdomen, 2020 Technique: Low-dose CT  acquisition technique included one of following options; 1 . Automated exposure control, 2. Adjustment of MA and or KV according to patient's size or 3. Use of iterative reconstruction.  Contiguous spiral images were obtained in the axial plane, following the administration of intravenous contrast using CT angiographic protocol. Sagittal and coronal images were reconstructed from the axial plane acquisition. Addition MIP reconstructions were presented to aid in the interpretation of this study. FINDINGS: Compared to 2020, there is interval placement of 2020, there is interval placement of a stent across the mid aortic arch. The remainder of the stents in the posterior aortic arch and descending thoracic aorta are stable in position. No endoleak is identified on the current study. The stent is patent. There is aneurysmal dilation of the thoracic aorta, which measures approximately 8.6 x 8.3 cm in the maximum caliber. The ascending aorta is of normal caliber. The origin of the left subclavian artery does not demonstrate enhancement appears occluded. The remainder of the left subclavian artery is patent. There is probable retrograde flow in the left vertebral artery. No pulmonary embolism is seen. The pulmonary arterial trunk is of normal caliber. The heart is mildly enlarged. Compared to the previous CTA, there is decrease in size of the loculated left-sided pleural fluid which appears more hypodense on the current study. Trace right pleural effusion is seen. Evaluation of the lungs reveals no consolidation. No nodule or mass is seen. Spinal hardware in the thoracolumbar spine no fracture or joint dislocation     1. No pulmonary embolism. 2. Placement of additional stent along the aortic arch since 2020. No evidence of endoleak. 3. 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 The remainder of the left subclavian artery is patent, likely due to retrograde flow through the vertebral artery. Carotid Doppler may be obtained to assess for the direction of flow in the vertebral arteries. 4. Moderate size loculated fluid in the left pleural space, decreased as of 2020.     Cta Abdomen Pelvis W Contrast    Result Date: 2020  Patient MRN:  [id number] : 1929 Age: 80 years Gender: Male Order Date: 7/1/2020 1:15 PM EXAM: CTA ABDOMEN PELVIS W CONTRAST NUMBER OF IMAGES: INDICATION:  hx of leaking AAA hx of leaking AAA COMPARISON: None Technique: Low-dose CT  acquisition technique included one of following options; 1 . Automated exposure control, 2. Adjustment of MA and or KV according to patient's size or 3. Use of iterative reconstruction. Contiguous spiral images were obtained in the axial plane, following the administration of intravenous contrast using CT angiographic protocol. Sagittal and coronal images were reconstructed from the axial plane acquisition. Addition 3-D MIP reconstructions were presented to aid in the interpretation of this study. FINDINGS: LIVER: Unremarkable. GALLBLADDER:Unremarkable SPLEEN:Unremarkable. ADRENALS:Unremarkable. KIDNEYS: Mild cortical thinning, likely age related. Left renal cysts. No solid lesion or hydronephrosis. No urolithiasis. Ronette Rudder PANCREAS:Unremarkable. BOWEL: Severe distal colonic diverticulosis without diverticulitis. No bowel wall thickening or obstruction. APPENDIX:Unremarkable. BLADDER: Somewhat limited evaluation due to decompression by Nunez catheter. The bladder wall appears trabeculated. Multiple cystic small diverticuli are seen. REPRODUCTIVE ORGANS: The prostate gland is normal in size. VASCULATURE: No evidence of intramural hematoma, aneurysm, dissection or rupture. Moderate calcified atherosclerosis is seen in the abdominal aorta which is tortuous but not aneurysmal. No evidence of dissection or significant stenosis. *  The celiac trunk and its major branches are grossly patent. *  The SMA is mildly ectatic but otherwise grossly patent. *   the GRADY is unremarkable. *  Mild-to-moderate atherosclerosis is seen renal arteries, which are otherwise patent. *  The common iliac, internal and external iliac arteries are widely patent. No evidence of aneurysm. LYMPH NODES:Unremarkable. BONES: No fracture or joint dislocation.  Postoperative changes from thoracolumbar fusion and cage reconstruction from T11 L1 are stable. MISCELLANEOUS: Small, is seen in the left frontal region, possibly related to recent intravascular access. No contrast extravasation is seen. No contrast extravasation to suggest active bleeding. Rim calcified lesion in the left inguinal region is of an unclear etiology. It may represent an old, calcified hematoma. Bilateral fat-containing inguinal hernias. No evidence of fat strangulation. 1. Tortuous abdominal aorta. No evidence of intramural hematoma, aneurysm, dissection or rupture. 2. Major branches of the aorta are grossly patent. 3. Complex fluid in the left inguinal region likely represents hematoma from recent intravascular access. No evidence of active extravasation. 4. Severe colonic diverticulosis without diverticulitis. 5. Trabeculated urinary bladder with small diverticuli.        Patient Instructions:      Medication List      START taking these medications    levETIRAcetam 500 MG tablet  Commonly known as:  Keppra  Take 1 tablet by mouth 2 times daily        CONTINUE taking these medications    apixaban 5 MG Tabs tablet  Commonly known as:  ELIQUIS  Take 1 tablet by mouth 2 times daily     aspirin 81 MG chewable tablet     atorvastatin 40 MG tablet  Commonly known as:  LIPITOR  Take 1 tablet by mouth daily     finasteride 5 MG tablet  Commonly known as:  PROSCAR     Flomax 0.4 MG capsule  Generic drug:  tamsulosin     furosemide 40 MG tablet  Commonly known as:  LASIX  Take 1 tablet by mouth daily     isosorbide mononitrate 60 MG extended release tablet  Commonly known as:  IMDUR  Take 1 tablet by mouth daily     magnesium chloride 535 (64 Mg) MG Tbcr extended release tablet  Commonly known as:  MAG DELAY     metoprolol succinate 100 MG extended release tablet  Commonly known as:  TOPROL XL  Take 1 tablet by mouth daily     OXYGEN     pantoprazole 40 MG tablet  Commonly known as:  PROTONIX  Take 1 tablet by mouth every morning (before breakfast)     Proventil  (90 Base) MCG/ACT inhaler  Generic drug:  albuterol sulfate HFA     sertraline 50 MG tablet  Commonly known as:  ZOLOFT  Take 1 tablet by mouth daily     therapeutic multivitamin-minerals tablet     tolterodine 4 MG extended release capsule  Commonly known as:  DETROL LA  Take 1 capsule by mouth every morning        STOP taking these medications    donepezil 10 MG tablet  Commonly known as:  ARICEPT           Where to Get Your Medications      These medications were sent to [address] [place] [address]    Phone:  [phone]   · levETIRAcetam 500 MG tablet           Note that more than 30 minutes was spent in preparing discharge papers, discussing discharge with patient, medication review, etc.    Signed:  Electronically signed by Zane Willard MD on 7/3/2020 at 6:53 PM

## 2020-07-03 NOTE — PROGRESS NOTES
Spoke with Dr. Alis Martinez. Patient accepted at [facility]. Awaiting for a bed.   Dhara Johnson

## 2020-07-03 NOTE — PROGRESS NOTES
Date: 7/2/2020    Time: 10:14 PM    Patient Placed On BIPAP/CPAP/ Non-Invasive Ventilation? No    If no must comment. Facial area red/color change? No           If YES are Blister/Lesion present? No   If yes must notify nursing staff  BIPAP/CPAP skin barrier? Yes    Skin barrier type:none       Comments: Patient refused CPAP. CPAP in room on standby if needed.         Jason Shock

## 2020-07-03 NOTE — PROGRESS NOTES
An order for Zofran and/or Phenergan has been discontinued for this patient due to the risk of QT prolongation. If an antiemetic is indicated for your patient, please consider use of Tigan or Compazine. Current QTc = 512   Please contact the Pharmacy with any questions. Thank Brant Nolan Pharm. D 7/3/2020 9:59 AM

## 2020-07-03 NOTE — PLAN OF CARE
Problem: Falls - Risk of:  Goal: Will remain free from falls  Description: Will remain free from falls  Outcome: Met This Shift  Goal: Absence of physical injury  Description: Absence of physical injury  Outcome: Met This Shift     Problem: Skin Integrity:  Goal: Will show no infection signs and symptoms  Description: Will show no infection signs and symptoms  Outcome: Met This Shift  Goal: Absence of new skin breakdown  Description: Absence of new skin breakdown  Outcome: Met This Shift     Problem: Mental Status - Impaired:  Goal: Mental status will improve  Description: Mental status will improve  Outcome: Met This Shift

## 2020-07-03 NOTE — PROGRESS NOTES
Silver Negron M.D.,[facility]  Radu Choudhary D.O., F.A.C.O.I., Teena Montalvo M.D. Wade Denton M.D., Artie Hwang M.D. Mora Hartman D.O. Daily Pulmonary Progress Note    Patient:  Oneida Gaming 80 y.o. male MRN: [id number]     Date of Service: 7/3/2020      Synopsis     We are following patient for Dyspnea, loculated pleural effusion    \"CC\" AMS shortness of breath    Code status: DNR CCA      Subjective      Patient was seen and examined. Mild shortness of breath. Oxygen 7 liters HF 95% at rest. Occasional non productive cough. No fevers. Family present at bedside. Review of Systems:  Constitutional: Denies fever, weight loss, night sweats, and fatigue  Skin: Denies pigmentation, dark lesions, and rashes   HEENT: Denies hearing loss, tinnitus, ear drainage, epistaxis, sore throat, and hoarseness. Cardiovascular: Denies palpitations, chest pain, and chest pressure.   Respiratory: mild dyspnea, non productive cough  Gastrointestinal: Denies nausea, vomiting, poor appetite, diarrhea, heartburn or reflux  Genitourinary: Denies dysuria, frequency, urgency or hematuria  Musculoskeletal: Denies myalgias, muscle weakness, and bone pain  Neurological: Denies dizziness, vertigo, headache, and focal weakness  Psychological: Denies anxiety and depression  Endocrine: Denies heat intolerance and cold intolerance  Hematopoietic/Lymphatic: Denies bleeding problems and blood transfusions    24-hour events:  None     Objective   Vitals: BP (!) 182/81   Pulse 95   Temp 98.6 °F (37 °C) (Temporal)   Resp 18   Ht 5' 4\" (1.626 m)   Wt 226 lb 12.8 oz (102.9 kg)   SpO2 95%   BMI 38.93 kg/m²     I/O:    Intake/Output Summary (Last 24 hours) at 7/3/2020 1257  Last data filed at 7/3/2020 0727  Gross per 24 hour   Intake 240 ml   Output 850 ml   Net -610 ml       Vent Information  SpO2: 95 %       IPAP: 12 cmH20  CPAP/EPAP: 6 cmH2O     CURRENT MEDS :  Scheduled Meds:   sodium chloride flush 10 mL Intravenous 2 times per day    insulin lispro  0-6 Units Subcutaneous TID WC    insulin lispro  0-3 Units Subcutaneous Nightly    menthol-zinc oxide   Topical BID    donepezil  10 mg Oral Nightly    metoprolol succinate  100 mg Oral Daily    furosemide  40 mg Oral Daily    trospium  20 mg Oral Nightly    sertraline  50 mg Oral Daily    pantoprazole  40 mg Oral QAM AC    apixaban  5 mg Oral BID    atorvastatin  40 mg Oral Nightly       Physical Exam:   General: The patient is lying in bed comfortably without any distress. Breathing is not labored  HEENT: Pupils are equal round and reactive to light, there are no oral lesions and no post-nasal drip   Neck: supple without adenopathy  Cardiovascular: regular rate and rhythm without murmur or gallop  Respiratory: Clear to auscultation bilaterally without wheezing or crackles. Air entry is symmetric  Abdomen: soft, non-tender, non-distended, normal bowel sounds  Extremities: warm, no edema, no clubbing  Skin: Has significant bruising and ecchymosis both upper arms. Neurologic: CN II-XII grossly intact, no focal deficits     Pulmonary Function Testing    none on file        Pertinent/ New Labs and Imaging Studies     Imaging Personally Reviewed:              ECHO  2019  Summary   Normal left ventricle size. Ejection fraction biplane =45%. Global hypokinesis mild   The left atrium is severely dilated. RVSP is 44 mmHg.          Labs:  Lab Results   Component Value Date    WBC 11.3 07/03/2020    HGB 10.7 07/03/2020    HCT 34.3 07/03/2020    .5 07/03/2020    MCH 33.2 07/03/2020    MCHC 31.2 07/03/2020    RDW 16.2 07/03/2020     07/03/2020    MPV 9.8 07/03/2020     Lab Results   Component Value Date     07/03/2020    K 4.1 07/03/2020     07/03/2020    CO2 31 07/03/2020    BUN 38 07/03/2020    CREATININE 1.6 07/03/2020    CREATININE 1.2 09/26/2018    LABALBU 2.8 07/01/2020    CALCIUM 9.3 07/03/2020    GFRAA 49 07/03/2020 LABGLOM 41 07/03/2020     Lab Results   Component Value Date    PROTIME 27.4 07/01/2020    PROTIME 17.9 09/11/2019    INR 2.3 07/01/2020     Recent Labs     07/01/20  1159   PROBNP 3,492*     Recent Labs     07/01/20  1159   PROCAL 0.12*     This SmartLink has not been configured with any valid records. Micro:  No results for input(s): CULTRESP in the last 72 hours. No results for input(s): LABGRAM in the last 72 hours. No results for input(s): LEGUR in the last 72 hours. No results for input(s): STREPNEUMAGU in the last 72 hours. No results for input(s): LP1UAG in the last 72 hours. Assessment:    1. Chronic left pleural effusion, loculated  2. Chronic respiratory failure with hypoxia uses 3-4 L NC baseline  3. 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 Bilateral CFA percutaneous access Arch aortogram Kev Shear with John Grady CTAG with active control 37 x 200 4. Left CFA cutdown. Primary repair left CFA arteriotomy at [facility]. With a large left hemithorax. Chest tube drainage at that time. At this point no further surgical intervention can be done per [facility] vascular surgeon. 4.  Chronic A. Fib.  5.  History of stroke July 2019. Plan:   1. The loculated effusion is decreased in size comparison to his last CT scan on 6/13. Given the complex history of his surgery and the fact that he had a large hemothorax with a chest tube placed at that time and the loculated effusion currently is smaller and improved and compare to his previous CT scan in June  I would not recommend any further intervention. 2. Patient  Oxygen up to 7 liters, baseline is 3-4 at home wean to keep >94%  3. Mental status is back to baseline as well. This plan of care was reviewed in collaboration with Dr. Eriberto Hackett  Electronically signed by SOCO Moss CNP on 7/3/2020 at 12:57 PM      Addendum:    Talked to patient's son at the bedside.  Explained that the pleural effusion is loculated and smaller in comparison to previous CT scan and no intervention is needed. Patient having self limiting  intermittent myoclonic jerks , not losing consciousness. Patient has chronic hypercapnia and per son history of MAX, is on PAP machine at [address] although He doesn't know the settings. Patient is hypernatremia and worsening JJ. Will hold diuretic. Will benefit from IV fluids will start him on D5 1/2 [email or web address] for 10 hours and repeat BMP at 6:00 p.m. and notify Dr. Marthann Canavan with results. Discussed with Dr. Marthann Canavan about transferring  Patient  to [facility] for further neurological evaluation. I personally saw, examined, and cared for the patient. Labs, medications, radiographs reviewed. I agree with history exam and plans detailed in NP note.   Arlene Gordon MD

## 2020-07-03 NOTE — PROGRESS NOTES
[facility] Hospitalist   Progress Note    Admitting Date and Time: 7/1/2020 11:16 AM  Admit Dx: AMS (altered mental status) [R41.82]    Subjective:    Pt feels well today. Pt is A/O x3. Denies pain at this time. Pt continues to have tremor like activity. Repeat CT pending. For transfer to [facility] for further Neurological evaluation. Per RN: Continues to have tremors. ROS: denies fever, chills, cp, sob, n/v, HA unless stated above.  levetiracetam  500 mg Intravenous Q12H    sodium chloride flush  10 mL Intravenous 2 times per day    insulin lispro  0-6 Units Subcutaneous TID WC    insulin lispro  0-3 Units Subcutaneous Nightly    menthol-zinc oxide   Topical BID    metoprolol succinate  100 mg Oral Daily    trospium  20 mg Oral Nightly    sertraline  50 mg Oral Daily    pantoprazole  40 mg Oral QAM AC    apixaban  5 mg Oral BID    atorvastatin  40 mg Oral Nightly     sodium chloride flush, 10 mL, PRN  acetaminophen, 650 mg, Q6H PRN    Or  acetaminophen, 650 mg, Q6H PRN  polyethylene glycol, 17 g, Daily PRN  glucose, 15 g, PRN  dextrose, 12.5 g, PRN  glucagon (rDNA), 1 mg, PRN  dextrose, 100 mL/hr, PRN         Objective:    BP (!) 182/81   Pulse 95   Temp 98.9 °F (37.2 °C) (Temporal)   Resp 18   Ht 5' 4\" (1.626 m)   Wt 226 lb 12.8 oz (102.9 kg)   SpO2 96%   BMI 38.93 kg/m²   General Appearance: alert and oriented to person, place and time and in no acute distress  Skin: warm and dry  Head: normocephalic and atraumatic  Eyes: pupils equal, round, and reactive to light, extraocular eye movements intact, conjunctivae normal  Neck: neck supple and non tender without mass   Pulmonary/Chest: Diminished to auscultation bilaterally- no wheezes, rales or rhonchi, normal air movement, no respiratory distress  Cardiovascular: normal rate, normal S1 and S2 no rubs, gallops, murmur noted  Abdomen: soft, non-tender, non-distended, normal bowel sounds, no masses or organomegaly.   No rebound, diverticuli. XR CHEST PORTABLE   Final Result      1. Pulmonary opacities in the left lung likely represent a combination   of pleural fluid with overlying compressive atelectasis and/or   consolidation. 2. Cardiomegaly. Tortuous aneurysmal aorta with a stent posterior arch   and descending thoracic aorta. CT Head WO Contrast    (Results Pending)       Assessment:  Active Problems:    AMS (altered mental status)  Resolved Problems:    * No resolved hospital problems. *      Plan:  1. AMS-resolved. Patient is A/O x3. Patient answers questions appropriately and can carry on conversation. CT of the head with no acute findings/old infarction. Repeat CT pending. 2.Acute on chronic respiratory failure with hypercapnia- patient wears 3 to 4L NC at facility. Currently on 6L NC. Continue CPAP chest..  Pulmonology Input appreciated. 3.Loculated pleural effusion left lung- improving since 6/13. No further intervention recommended by pulmonology. 4.S/p TAA T EVAR- performed at [facility]. ED physician discussed case with [facility] vascular surgery. To present treatment or surgical intervention per their standpoint. 5.Recent pneumonia- completed treatment of Levaquin. 6.DM II-glucose 112. Continue SSI/hypoglycemic protocol. Carb    controlled diet. 7.MAX-continue CPAP    8. HTN- currently controlled. Continue current regimen and adjust as needed    9. CKD- BUN/Crt 31/1.4>38/1.6. D5 and 0.45% initiated. continue to follow closely. Avoid nephrotoxic agents  10. Hypernatremia- IVF hydration as above. Repeat BMP this evening. Continue to follow. 10. Tremors- 2/2 Tardive Dyskinesia/CVA/Seizure activity? Repeat CT of head. Keppra initiated. ABBEY donepezil. For transfer to [facility] for further Neurological evaluation. NOTE: This report was transcribed using voice recognition software. Every effort was made to ensure accuracy; however, inadvertent computerized transcription errors may be present. Electronically signed by Karissa William Heart - CNP on 7/3/2020 at 3:22 PM

## 2020-07-03 NOTE — PROGRESS NOTES
7/3/2020  3:44 PM      Nutrition Assessment    Type and Reason for Visit: Initial, Positive Nutrition Screen, Consult(Royce score and Wound Consult)    Nutrition Recommendations: ADAT when medically feasible and will add ONS at that time    Nutrition Assessment: Pt admit w/AMS but now at baseline. Loculated pleural effusion w/PMH COPD, CKD 3 and dementia. Pt has been eating poorly. NPO currently but when PO resumed, will add ONS    Malnutrition Assessment:  · Malnutrition Status: At risk for malnutrition  · Context: Acute illness or injury  · Findings of the 6 clinical characteristics of malnutrition (Minimum of 2 out of 6 clinical characteristics is required to make the diagnosis of moderate or severe Protein Calorie Malnutrition based on AND/ASPEN Guidelines):  1. Energy Intake-Less than or equal to 50% of estimated energy requirement, Unable to assess    2. Weight Loss-No significant weight loss,    3. Fat Loss-No significant subcutaneous fat loss,    4. Muscle Loss-No significant muscle mass loss,    5. Fluid Accumulation-No significant fluid accumulation, Extremities(multiple factors )  6.  Strength- Not measured    Nutrition Risk Level:  Moderate    Nutrient Needs:  · Estimated Daily Total Kcal:  (MSJ x 1.25)  · Estimated Daily Protein (g): 80-90 (1.3-1.5 g/kg)  · Estimated Daily Total Fluid (ml/day):  (1 ml/rodrick)    Nutrition Diagnosis:   · Problem: Inadequate oral intake  · Etiology: related to Early satiety(2/2 dyspnea/SOB)     Signs and symptoms:  as evidenced by Intake 0-25%, Presence of wounds    Objective Information:  · Nutrition-Focused Physical Findings: soft abdomen w/+BS, +2 edema, I/O WNL, baseline mentation, poor appetite  · Wound Type: (per wound care - IAD and epidurmal skin loss on buttocks)  · Current Nutrition Therapies:  · Oral Diet Orders: NPO   · Oral Diet intake: 1-25%(prior to NPO)  · Oral Nutrition Supplement (ONS) Orders:    · ONS intake:    · Anthropometric Measures:  · Ht: 5' 4\" (162.6 cm)   · Current Body Wt: 226 lb (102.5 kg)(7/3 bedscale)  · Admission Body Wt: 219 lb (99.3 kg)(7/1 bedscale)  · Usual Body Wt: 208 lb (94.3 kg)(per EMR x 1 yr ago)  · % Weight Change:  ,  none significant  · Ideal Body Wt: 130 lb (59 kg), % Ideal Body 168%  · BMI Classification: BMI 35.0 - 39.9 Obese Class II    Nutrition Interventions:   Start ONS, Continue NPO(When medically feasible, recommend ADAT and will add ONS at that time)  Education Not Indicated    Nutrition Evaluation:   · Evaluation: Goals set   · Goals: Nutrition progression    · Monitoring: Nutrition Progression, Meal Intake, Supplement Intake, Skin Integrity, Wound Healing, I&O, Mental Status/Confusion, Weight, Pertinent Labs      Electronically signed by Meri Hanna RD, CNSC, LD on 7/3/20 at 3:44 PM EDT    Contact Number: [phone]

## 2020-07-03 NOTE — PROGRESS NOTES
Addressed with Dr. Irina Ronquillo patients code status at [facility] was a full code according to paper work.    Jaiden Mg

## 2020-07-09 NOTE — TELEPHONE ENCOUNTER
Carolina from Hospice calling, states that [facility] will be discharging patient to home tomorrow. Asking if you can provide terminal diagnosis with prognosis of 6 months or less. Also would like to know if you will follow patient in hospice or if you would like to defer treatment to their medical director. Call back [phone] ask for referral department.

## 2020-07-13 NOTE — TELEPHONE ENCOUNTER
Hospice called, pt is having trouble sleeping. They would like us to send an order for Melatonin 3mg q hs to [facility]. Rx is ready to be sent.      They will also be sending over orders for a comfort kit

## 2020-07-20 NOTE — TELEPHONE ENCOUNTER
Ramana from hospice called and said his nurse called and got an order for ativan but they didn't get the quanity or refills, please advise and call hospice with that information at [phone]

## 2020-07-22 LAB — URINE CULTURE, ROUTINE: NORMAL

## 2020-09-01 ENCOUNTER — CARE COORDINATION (OUTPATIENT)
Dept: CASE MANAGEMENT | Age: 85
End: 2020-09-01
